# Patient Record
Sex: FEMALE | Race: WHITE | NOT HISPANIC OR LATINO | Employment: OTHER | RURAL
[De-identification: names, ages, dates, MRNs, and addresses within clinical notes are randomized per-mention and may not be internally consistent; named-entity substitution may affect disease eponyms.]

---

## 2019-02-21 ENCOUNTER — CONVERSION ENCOUNTER (OUTPATIENT)
Dept: FAMILY MEDICINE CLINIC | Facility: CLINIC | Age: 64
End: 2019-02-21

## 2019-02-21 LAB — HEMOCCULT STL QL IA: NEGATIVE

## 2019-03-29 ENCOUNTER — ON CAMPUS - OUTPATIENT (AMBULATORY)
Dept: URBAN - METROPOLITAN AREA HOSPITAL 2 | Facility: HOSPITAL | Age: 64
End: 2019-03-29
Payer: COMMERCIAL

## 2019-03-29 ENCOUNTER — OFFICE (AMBULATORY)
Dept: URBAN - METROPOLITAN AREA PATHOLOGY 4 | Facility: PATHOLOGY | Age: 64
End: 2019-03-29
Payer: COMMERCIAL

## 2019-03-29 VITALS
OXYGEN SATURATION: 96 % | OXYGEN SATURATION: 97 % | DIASTOLIC BLOOD PRESSURE: 78 MMHG | TEMPERATURE: 97.9 F | HEIGHT: 67 IN | DIASTOLIC BLOOD PRESSURE: 82 MMHG | DIASTOLIC BLOOD PRESSURE: 97 MMHG | OXYGEN SATURATION: 99 % | OXYGEN SATURATION: 98 % | SYSTOLIC BLOOD PRESSURE: 124 MMHG | SYSTOLIC BLOOD PRESSURE: 118 MMHG | OXYGEN SATURATION: 95 % | HEART RATE: 68 BPM | HEART RATE: 62 BPM | DIASTOLIC BLOOD PRESSURE: 76 MMHG | DIASTOLIC BLOOD PRESSURE: 83 MMHG | HEART RATE: 66 BPM | SYSTOLIC BLOOD PRESSURE: 123 MMHG | RESPIRATION RATE: 18 BRPM | SYSTOLIC BLOOD PRESSURE: 122 MMHG | DIASTOLIC BLOOD PRESSURE: 84 MMHG | DIASTOLIC BLOOD PRESSURE: 63 MMHG | WEIGHT: 195 LBS | HEART RATE: 71 BPM | RESPIRATION RATE: 16 BRPM | HEART RATE: 72 BPM | HEART RATE: 67 BPM | SYSTOLIC BLOOD PRESSURE: 129 MMHG | HEART RATE: 63 BPM | SYSTOLIC BLOOD PRESSURE: 116 MMHG

## 2019-03-29 DIAGNOSIS — K62.1 RECTAL POLYP: ICD-10-CM

## 2019-03-29 DIAGNOSIS — Z12.11 ENCOUNTER FOR SCREENING FOR MALIGNANT NEOPLASM OF COLON: ICD-10-CM

## 2019-03-29 DIAGNOSIS — K64.8 OTHER HEMORRHOIDS: ICD-10-CM

## 2019-03-29 DIAGNOSIS — K57.30 DIVERTICULOSIS OF LARGE INTESTINE WITHOUT PERFORATION OR ABS: ICD-10-CM

## 2019-03-29 LAB
GI HISTOLOGY: A. UNSPECIFIED: (no result)
GI HISTOLOGY: PDF REPORT: (no result)

## 2019-03-29 PROCEDURE — 45380 COLONOSCOPY AND BIOPSY: CPT | Mod: 33 | Performed by: INTERNAL MEDICINE

## 2019-03-29 PROCEDURE — 88305 TISSUE EXAM BY PATHOLOGIST: CPT | Mod: 33 | Performed by: INTERNAL MEDICINE

## 2019-07-10 ENCOUNTER — OFFICE VISIT (OUTPATIENT)
Dept: FAMILY MEDICINE CLINIC | Facility: CLINIC | Age: 64
End: 2019-07-10

## 2019-07-10 ENCOUNTER — HOSPITAL ENCOUNTER (OUTPATIENT)
Dept: GENERAL RADIOLOGY | Facility: HOSPITAL | Age: 64
Discharge: HOME | End: 2019-07-10
Admitting: FAMILY MEDICINE

## 2019-07-10 VITALS
SYSTOLIC BLOOD PRESSURE: 126 MMHG | RESPIRATION RATE: 18 BRPM | HEART RATE: 76 BPM | BODY MASS INDEX: 31.86 KG/M2 | HEIGHT: 67 IN | TEMPERATURE: 97.7 F | WEIGHT: 203 LBS | OXYGEN SATURATION: 96 % | DIASTOLIC BLOOD PRESSURE: 78 MMHG

## 2019-07-10 DIAGNOSIS — G44.209 TENSION HEADACHE: Primary | ICD-10-CM

## 2019-07-10 DIAGNOSIS — M54.2 NECK PAIN: ICD-10-CM

## 2019-07-10 PROCEDURE — 72050 X-RAY EXAM NECK SPINE 4/5VWS: CPT

## 2019-07-10 PROCEDURE — 99213 OFFICE O/P EST LOW 20 MIN: CPT | Performed by: FAMILY MEDICINE

## 2019-07-10 RX ORDER — TIZANIDINE 4 MG/1
4 TABLET ORAL 3 TIMES DAILY PRN
Qty: 90 TABLET | Refills: 2 | Status: SHIPPED | OUTPATIENT
Start: 2019-07-10 | End: 2019-10-05 | Stop reason: SDUPTHER

## 2019-07-10 RX ORDER — ACETAMINOPHEN, ASPIRIN AND CAFFEINE 250; 250; 65 MG/1; MG/1; MG/1
2 TABLET, FILM COATED ORAL EVERY 8 HOURS PRN
COMMUNITY

## 2019-07-10 RX ORDER — ESCITALOPRAM OXALATE 10 MG/1
1 TABLET ORAL NIGHTLY
Refills: 5 | COMMUNITY
Start: 2019-06-20 | End: 2019-09-23

## 2019-07-10 RX ORDER — MELOXICAM 15 MG/1
1 TABLET ORAL AS NEEDED
COMMUNITY
Start: 2019-05-11 | End: 2020-09-18

## 2019-07-15 ENCOUNTER — TELEPHONE (OUTPATIENT)
Dept: FAMILY MEDICINE CLINIC | Facility: CLINIC | Age: 64
End: 2019-07-15

## 2019-09-23 ENCOUNTER — OFFICE VISIT (OUTPATIENT)
Dept: FAMILY MEDICINE CLINIC | Facility: CLINIC | Age: 64
End: 2019-09-23

## 2019-09-23 VITALS
SYSTOLIC BLOOD PRESSURE: 132 MMHG | OXYGEN SATURATION: 97 % | HEIGHT: 67 IN | RESPIRATION RATE: 18 BRPM | DIASTOLIC BLOOD PRESSURE: 88 MMHG | BODY MASS INDEX: 32.36 KG/M2 | HEART RATE: 55 BPM | WEIGHT: 206.2 LBS | TEMPERATURE: 97.4 F

## 2019-09-23 DIAGNOSIS — Z01.818 PRE-OPERATIVE CLEARANCE: ICD-10-CM

## 2019-09-23 DIAGNOSIS — Z11.59 NEED FOR HEPATITIS C SCREENING TEST: ICD-10-CM

## 2019-09-23 DIAGNOSIS — Z12.39 SCREENING FOR BREAST CANCER: ICD-10-CM

## 2019-09-23 DIAGNOSIS — Z23 NEED FOR PNEUMOCOCCAL VACCINATION: ICD-10-CM

## 2019-09-23 DIAGNOSIS — Z23 NEED FOR TETANUS BOOSTER: ICD-10-CM

## 2019-09-23 DIAGNOSIS — Z12.4 SCREENING FOR CERVICAL CANCER: ICD-10-CM

## 2019-09-23 DIAGNOSIS — Z00.00 ANNUAL PHYSICAL EXAM: Primary | ICD-10-CM

## 2019-09-23 DIAGNOSIS — M17.12 PRIMARY OSTEOARTHRITIS OF LEFT KNEE: ICD-10-CM

## 2019-09-23 DIAGNOSIS — Z90.81 H/O SPLENECTOMY: ICD-10-CM

## 2019-09-23 DIAGNOSIS — F33.0 MILD EPISODE OF RECURRENT MAJOR DEPRESSIVE DISORDER (HCC): ICD-10-CM

## 2019-09-23 PROBLEM — R51.9 CHRONIC HEADACHES: Status: ACTIVE | Noted: 2019-02-14

## 2019-09-23 PROBLEM — M67.919 DISORDER OF BURSAE AND TENDONS IN SHOULDER REGION: Status: ACTIVE | Noted: 2019-09-23

## 2019-09-23 PROBLEM — D64.9 ANEMIA: Status: ACTIVE | Noted: 2019-02-14

## 2019-09-23 PROBLEM — G89.29 CHRONIC HEADACHES: Status: ACTIVE | Noted: 2019-02-14

## 2019-09-23 PROBLEM — F33.1 MODERATE EPISODE OF RECURRENT MAJOR DEPRESSIVE DISORDER: Status: ACTIVE | Noted: 2019-02-14

## 2019-09-23 PROBLEM — F32.A DEPRESSION: Status: ACTIVE | Noted: 2019-09-23

## 2019-09-23 PROBLEM — E66.3 OVERWEIGHT: Status: ACTIVE | Noted: 2019-09-23

## 2019-09-23 PROBLEM — I10 HYPERTENSION: Status: ACTIVE | Noted: 2019-02-14

## 2019-09-23 PROBLEM — E66.9 OBESITY: Status: ACTIVE | Noted: 2019-09-23

## 2019-09-23 PROBLEM — M19.90 ARTHRITIS: Status: ACTIVE | Noted: 2019-02-14

## 2019-09-23 PROBLEM — M71.9 DISORDER OF BURSAE AND TENDONS IN SHOULDER REGION: Status: ACTIVE | Noted: 2019-09-23

## 2019-09-23 LAB
BILIRUB BLD-MCNC: ABNORMAL MG/DL
CLARITY, POC: CLEAR
COLOR UR: YELLOW
GLUCOSE UR STRIP-MCNC: NEGATIVE MG/DL
KETONES UR QL: NEGATIVE
LEUKOCYTE EST, POC: ABNORMAL
NITRITE UR-MCNC: NEGATIVE MG/ML
PH UR: 5 [PH] (ref 5–8)
PROT UR STRIP-MCNC: ABNORMAL MG/DL
RBC # UR STRIP: NEGATIVE /UL
SP GR UR: 1.01 (ref 1–1.03)
UROBILINOGEN UR QL: NORMAL

## 2019-09-23 PROCEDURE — 81002 URINALYSIS NONAUTO W/O SCOPE: CPT | Performed by: FAMILY MEDICINE

## 2019-09-23 PROCEDURE — 90472 IMMUNIZATION ADMIN EACH ADD: CPT | Performed by: FAMILY MEDICINE

## 2019-09-23 PROCEDURE — 99396 PREV VISIT EST AGE 40-64: CPT | Performed by: FAMILY MEDICINE

## 2019-09-23 PROCEDURE — 90732 PPSV23 VACC 2 YRS+ SUBQ/IM: CPT | Performed by: FAMILY MEDICINE

## 2019-09-23 PROCEDURE — 90471 IMMUNIZATION ADMIN: CPT | Performed by: FAMILY MEDICINE

## 2019-09-23 PROCEDURE — 90715 TDAP VACCINE 7 YRS/> IM: CPT | Performed by: FAMILY MEDICINE

## 2019-09-23 RX ORDER — ESCITALOPRAM OXALATE 20 MG/1
20 TABLET ORAL NIGHTLY
Status: SHIPPED | COMMUNITY
Start: 2019-09-23 | End: 2020-09-18 | Stop reason: SDUPTHER

## 2019-09-23 RX ORDER — DICLOFENAC SODIUM 75 MG/1
1 TABLET, DELAYED RELEASE ORAL 2 TIMES DAILY PRN
COMMUNITY
Start: 2019-08-08 | End: 2020-09-18

## 2019-09-23 RX ORDER — TIZANIDINE 4 MG/1
1 TABLET ORAL 3 TIMES DAILY PRN
Refills: 2 | COMMUNITY
Start: 2019-09-09 | End: 2019-10-05 | Stop reason: SDUPTHER

## 2019-09-23 NOTE — PROGRESS NOTES
Chief Complaint   Patient presents with   • Annual Exam     Subjective   Adela Dowell is a 64 y.o. female here for her annual physical with me. Adela is here for coordination of medical care, to discuss health maintenance, disease prevention as well as to followup on medical problems. Patient has been followed by me since 07/10/2019.  Patient's last CPE was about 3 years ago with Dr. Ronald Foley.  Activity level is minimal.  Exercises 3 per week.  Appetite is good. Feels well with minimal complaints. Energy level is fair. Sleeps well. Patient's last colonoscopy was March 2019. She is advised to repeat in 10 years. Patient's last mammogram was about 5 years ago at [facility] . Patient is doing routine self skin exam monthly. Patient is doing routine self-breast exams never.    Transition to Care:  Since her last visit, she was seen by Dr. Dillard and started on escitalopram.  Dosage has been titrated to 20 mg daily, and she reports improvement in her symptoms.    She will be having knee replacement surgery at the end of next month ([facility], Dr. Castro and Dr. Godinez).  Needs pre-operative clearance today.    Last mammogram was done at [facility] (10/14/2015) and was abnormal (BIRAD 0, 6 mm asymmetric density left upper breast, additional imaging of the left breast recommended).  She does not recall having additional imaging done.  She has no breast complaints today.    Last pap smear years ago.  She denies h/o abnormal pap smears and denies gyn complaints today.      Allergies:  Allergies   Allergen Reactions   • Levofloxacin Other (See Comments) and Swelling     Joint swelling       Social History:  Social History     Socioeconomic History   • Marital status:      Spouse name: Not on file   • Number of children: Not on file   • Years of education: Not on file   • Highest education level: Not on file   Tobacco Use   • Smoking status: Never Smoker   • Smokeless tobacco: Never Used   Substance and  Sexual Activity   • Alcohol use: No     Frequency: Never   • Drug use: No   • Sexual activity: Defer       Family History:  History reviewed. No pertinent family history.    Past Medical History :  Active Ambulatory Problems     Diagnosis Date Noted   • Moderate episode of recurrent major depressive disorder (CMS/HCC) 02/14/2019   • Hypertension 02/14/2019   • Disorder of bursae and tendons in shoulder region 09/23/2019   • Depression 09/23/2019   • Chronic headaches 02/14/2019   • Arthritis 02/14/2019   • Anemia 02/14/2019   • Overweight 09/23/2019   • Obesity 09/23/2019   • H/O splenectomy 09/23/2019       Past Medical History:   Diagnosis Date   • Anxiety    • Depression    • Headache        Medication List:    Current Outpatient Medications:   •  aspirin-acetaminophen-caffeine (EXCEDRIN MIGRAINE) 250-250-65 MG per tablet, Take 2 tablets by mouth Every 8 (Eight) Hours As Needed for Headache., Disp: , Rfl:   •  diclofenac (VOLTAREN) 75 MG EC tablet, Take 1 tablet by mouth 2 (Two) Times a Day As Needed., Disp: , Rfl:   •  escitalopram (LEXAPRO) 20 MG tablet, Take 1 tablet by mouth Every Night., Disp: , Rfl:   •  tiZANidine (ZANAFLEX) 4 MG tablet, Take 1 mg by mouth 3 (Three) Times a Day As Needed., Disp: , Rfl: 2  •  meloxicam (MOBIC) 15 MG tablet, Take 1 tablet by mouth As Needed., Disp: , Rfl:   No current facility-administered medications for this visit.     Past Surgical History:  Past Surgical History:   Procedure Laterality Date   • ROTATOR CUFF REPAIR Right 2011    x2   • SINUS SURGERY Bilateral 2000   • SPLENECTOMY     • TUBAL ABDOMINAL LIGATION       Depression Screen not needed due to diagnosis of major depression.    Review Of Systems:  Review of Systems   Constitutional: Negative for chills and fever.   HENT: Negative for ear pain, sore throat and trouble swallowing.    Eyes: Negative for double vision and visual disturbance.   Respiratory: Negative for cough and shortness of breath.    Cardiovascular:  "Negative for chest pain, palpitations and leg swelling.   Gastrointestinal: Negative for abdominal pain, blood in stool, constipation, diarrhea, nausea and vomiting.   Endocrine: Negative for polydipsia and polyuria.   Genitourinary: Negative for breast discharge, breast lump, breast pain, dysuria, hematuria, pelvic pain, urinary incontinence, vaginal bleeding and vaginal discharge.   Musculoskeletal: Positive for arthralgias. Negative for myalgias.   Skin: Positive for skin lesions. Negative for rash.   Allergic/Immunologic: Positive for immunocompromised state.   Neurological: Negative for seizures, syncope, numbness and headache (none currently).   Psychiatric/Behavioral: Positive for depressed mood (better). Negative for behavioral problems.       The following portions of the patient's history were reviewed and updated as appropriate: allergies, current medications, past family history, past medical history, past social history, past surgical history and problem list.      Physical Exam:  Vital Signs:  /88 (BP Location: Left arm, Patient Position: Sitting, Cuff Size: Large Adult)   Pulse 55   Temp 97.4 °F (36.3 °C) (Oral)   Resp 18   Ht 170.2 cm (67\")   Wt 93.5 kg (206 lb 3.2 oz)   SpO2 97%   BMI 32.30 kg/m²     Physical Exam   Constitutional: She appears well-developed and well-nourished. No distress.   HENT:   Head: Normocephalic and atraumatic.   Right Ear: Tympanic membrane, external ear and ear canal normal.   Left Ear: Tympanic membrane, external ear and ear canal normal.   Mouth/Throat: Uvula is midline and mucous membranes are normal. Posterior oropharyngeal erythema present. No posterior oropharyngeal edema or tonsillar abscesses.   Eyes: Conjunctivae, EOM and lids are normal. Pupils are equal, round, and reactive to light.   Neck: Normal range of motion. Neck supple. No JVD present. Carotid bruit is not present. No edema present. No thyromegaly present.   Cardiovascular: Normal rate, " regular rhythm, S1 normal, S2 normal and normal heart sounds.   No murmur heard.  Pulses:       Radial pulses are 2+ on the right side, and 2+ on the left side.        Posterior tibial pulses are 2+ on the right side, and 2+ on the left side.   Pulmonary/Chest: Effort normal and breath sounds normal. Right breast exhibits no mass, no nipple discharge, no skin change and no tenderness. Left breast exhibits no mass, no nipple discharge, no skin change and no tenderness. Breasts are symmetrical.   Abdominal: Soft. Bowel sounds are normal. There is no tenderness. There is no rebound and no guarding.   Genitourinary: Rectum normal, vagina normal, uterus normal and cervix normal. Pelvic exam was performed with patient supine. There is no lesion on the right labia. There is no lesion on the left labia. Right adnexum displays no mass and no tenderness. Left adnexum displays no mass and no tenderness. No vaginal discharge found.   Musculoskeletal: Normal range of motion.     Vascular Status -  Her right foot exhibits normal foot vasculature  and no edema. Her left foot exhibits normal foot vasculature  and no edema.  Lymphadenopathy:     She has no cervical adenopathy.   Neurological: She is alert. No cranial nerve deficit.   Reflex Scores:       Bicep reflexes are 1+ on the right side and 1+ on the left side.       Patellar reflexes are 2+ on the right side and 2+ on the left side.  Skin: Skin is warm, dry and intact. Capillary refill takes less than 2 seconds. Turgor is normal. No rash noted. No cyanosis.        Psychiatric: She has a normal mood and affect. Her speech is normal and behavior is normal. Judgment and thought content normal. Cognition and memory are normal.     Brief Urine Lab Results  (Last result in the past 365 days)      Color   Clarity   Blood   Leuk Est   Nitrite   Protein   CREAT   Urine HCG        09/23/19 0846 Yellow Clear Negative Trace Negative 2+                 Assessment and Plan:  Diagnoses and  all orders for this visit:    1. Annual physical exam (Primary)  Comments:  Questions and concerns addressed.  Age appropriate screening conducted.  Orders:  -     CBC Auto Differential  -     Comprehensive Metabolic Panel  -     Lipid Panel  -     TSH  -     POCT urinalysis dipstick, manual  -     Liquid-based Pap Smear, Screening  -     HPV, Low Volume Reflex    2. Screening for cervical cancer  Comments:  Pap smear collected.  Orders:  -     Liquid-based Pap Smear, Screening  -     HPV, Low Volume Reflex    3. Screening for breast cancer  Comments:  Mammogram ordered and scheduled for [facility] on 9/26/19 at 9:00 am.  Orders:  -     Mammo Screening Digital Tomosynthesis Bilateral With CAD; Future    4. Need for hepatitis C screening test  -     Hepatitis C antibody    5. Need for pneumococcal vaccination  -     Pneumococcal Polysaccharide Vaccine 23-Valent Greater Than or Equal To 1yo Subcutaneous / IM    6. Need for tetanus booster  -     Tdap Vaccine Greater Than or Equal To 8yo IM    7. H/O splenectomy  Comments:  Plan to update Prevnar and Meningitis vaccinations at future appointment.  Orders:  -     Pneumococcal Polysaccharide Vaccine 23-Valent Greater Than or Equal To 1yo Subcutaneous / IM    8. Mild episode of recurrent major depressive disorder (CMS/HCC)  Comments:  Per Dr. Dillard.    9. Primary osteoarthritis of left knee  Comments:  Per orthopedic surgery.    10. Pre-operative clearance  Comments:  She is low risk for CV and pulmonary complications.  Await labs.  She will have pre-op clearance through [facility].  Will send letter to surgery.

## 2019-09-24 DIAGNOSIS — R74.8 ELEVATED ALKALINE PHOSPHATASE LEVEL: Primary | ICD-10-CM

## 2019-09-24 LAB
ALBUMIN SERPL-MCNC: 4.3 G/DL (ref 3.6–4.8)
ALBUMIN/GLOB SERPL: 1.8 {RATIO} (ref 1.2–2.2)
ALP SERPL-CCNC: 153 IU/L (ref 39–117)
ALT SERPL-CCNC: 12 IU/L (ref 0–32)
AST SERPL-CCNC: 20 IU/L (ref 0–40)
BASOPHILS # BLD AUTO: 0.1 X10E3/UL (ref 0–0.2)
BASOPHILS NFR BLD AUTO: 1 %
BILIRUB SERPL-MCNC: 0.5 MG/DL (ref 0–1.2)
BUN SERPL-MCNC: 13 MG/DL (ref 8–27)
BUN/CREAT SERPL: 18 (ref 12–28)
CALCIUM SERPL-MCNC: 9.4 MG/DL (ref 8.7–10.3)
CHLORIDE SERPL-SCNC: 103 MMOL/L (ref 96–106)
CHOLEST SERPL-MCNC: 185 MG/DL (ref 100–199)
CO2 SERPL-SCNC: 26 MMOL/L (ref 20–29)
CREAT SERPL-MCNC: 0.72 MG/DL (ref 0.57–1)
EOSINOPHIL # BLD AUTO: 0.5 X10E3/UL (ref 0–0.4)
EOSINOPHIL NFR BLD AUTO: 7 %
ERYTHROCYTE [DISTWIDTH] IN BLOOD BY AUTOMATED COUNT: 13.8 % (ref 12.3–15.4)
GLOBULIN SER CALC-MCNC: 2.4 G/DL (ref 1.5–4.5)
GLUCOSE SERPL-MCNC: 85 MG/DL (ref 65–99)
HCT VFR BLD AUTO: 41 % (ref 34–46.6)
HCV AB S/CO SERPL IA: <0.1 S/CO RATIO (ref 0–0.9)
HDLC SERPL-MCNC: 50 MG/DL
HGB BLD-MCNC: 13.8 G/DL (ref 11.1–15.9)
IMM GRANULOCYTES # BLD AUTO: 0 X10E3/UL (ref 0–0.1)
IMM GRANULOCYTES NFR BLD AUTO: 0 %
LDLC SERPL CALC-MCNC: 112 MG/DL (ref 0–99)
LYMPHOCYTES # BLD AUTO: 3.1 X10E3/UL (ref 0.7–3.1)
LYMPHOCYTES NFR BLD AUTO: 42 %
MCH RBC QN AUTO: 28.9 PG (ref 26.6–33)
MCHC RBC AUTO-ENTMCNC: 33.7 G/DL (ref 31.5–35.7)
MCV RBC AUTO: 86 FL (ref 79–97)
MONOCYTES # BLD AUTO: 0.8 X10E3/UL (ref 0.1–0.9)
MONOCYTES NFR BLD AUTO: 11 %
NEUTROPHILS # BLD AUTO: 2.9 X10E3/UL (ref 1.4–7)
NEUTROPHILS NFR BLD AUTO: 39 %
PLATELET # BLD AUTO: 404 X10E3/UL (ref 150–450)
POTASSIUM SERPL-SCNC: 4.8 MMOL/L (ref 3.5–5.2)
PROT SERPL-MCNC: 6.7 G/DL (ref 6–8.5)
RBC # BLD AUTO: 4.78 X10E6/UL (ref 3.77–5.28)
SODIUM SERPL-SCNC: 144 MMOL/L (ref 134–144)
TRIGL SERPL-MCNC: 116 MG/DL (ref 0–149)
TSH SERPL DL<=0.005 MIU/L-ACNC: 2.57 UIU/ML (ref 0.45–4.5)
VLDLC SERPL CALC-MCNC: 23 MG/DL (ref 5–40)
WBC # BLD AUTO: 7.5 X10E3/UL (ref 3.4–10.8)

## 2019-09-27 LAB
CYTOLOGIST CVX/VAG CYTO: NORMAL
CYTOLOGY CVX/VAG DOC CYTO: NORMAL
DX ICD CODE: NORMAL
HIV 1 & 2 AB SER-IMP: NORMAL
HPV I/H RISK 1 DNA CVX QL PROBE+SIG AMP: NEGATIVE
OTHER STN SPEC: NORMAL
STAT OF ADQ CVX/VAG CYTO-IMP: NORMAL

## 2019-09-30 DIAGNOSIS — Z12.39 SCREENING FOR BREAST CANCER: ICD-10-CM

## 2019-10-01 ENCOUNTER — RESULTS ENCOUNTER (OUTPATIENT)
Dept: FAMILY MEDICINE CLINIC | Facility: CLINIC | Age: 64
End: 2019-10-01

## 2019-10-01 DIAGNOSIS — R74.8 ELEVATED ALKALINE PHOSPHATASE LEVEL: ICD-10-CM

## 2019-10-02 LAB
ALP BONE CFR SERPL: 43 % (ref 14–68)
ALP INTEST CFR SERPL: 2 % (ref 0–18)
ALP LIVER CFR SERPL: 55 % (ref 18–85)
ALP SERPL-CCNC: 155 IU/L (ref 39–117)
GGT SERPL-CCNC: 13 IU/L (ref 0–60)

## 2019-10-05 DIAGNOSIS — G44.209 TENSION HEADACHE: ICD-10-CM

## 2019-10-05 RX ORDER — TIZANIDINE 4 MG/1
TABLET ORAL
Qty: 90 TABLET | Refills: 2 | Status: SHIPPED | OUTPATIENT
Start: 2019-10-05 | End: 2020-01-29

## 2019-11-15 ENCOUNTER — TREATMENT (OUTPATIENT)
Dept: PHYSICAL THERAPY | Facility: CLINIC | Age: 64
End: 2019-11-15

## 2019-11-15 ENCOUNTER — TRANSCRIBE ORDERS (OUTPATIENT)
Dept: PHYSICAL THERAPY | Facility: CLINIC | Age: 64
End: 2019-11-15

## 2019-11-15 DIAGNOSIS — M25.562 CHRONIC PAIN OF LEFT KNEE: Primary | ICD-10-CM

## 2019-11-15 DIAGNOSIS — G89.29 CHRONIC PAIN OF LEFT KNEE: Primary | ICD-10-CM

## 2019-11-15 DIAGNOSIS — Z96.652 PRESENCE OF LEFT ARTIFICIAL KNEE JOINT: ICD-10-CM

## 2019-11-15 DIAGNOSIS — Z96.652 TOTAL KNEE REPLACEMENT STATUS, LEFT: Primary | ICD-10-CM

## 2019-11-15 PROCEDURE — 97014 ELECTRIC STIMULATION THERAPY: CPT | Performed by: PHYSICAL THERAPIST

## 2019-11-15 PROCEDURE — 97140 MANUAL THERAPY 1/> REGIONS: CPT | Performed by: PHYSICAL THERAPIST

## 2019-11-15 PROCEDURE — 97161 PT EVAL LOW COMPLEX 20 MIN: CPT | Performed by: PHYSICAL THERAPIST

## 2019-11-15 PROCEDURE — 97110 THERAPEUTIC EXERCISES: CPT | Performed by: PHYSICAL THERAPIST

## 2019-11-15 NOTE — PROGRESS NOTES
"  Physical Therapy Initial Evaluation and Plan of Care    Patient: Adela Dowell   : 1955  Diagnosis/ICD-10 Code:  Chronic pain of left knee [M25.562, G89.29]  Referring practitioner: Milton Castro MD  Date of Initial Visit: 11/15/2019  Today's Date: 11/15/2019  Patient seen for 1 sessions             Subjective Evaluation    History of Present Illness  Mechanism of injury: Patient is a 64 y.o. WF who presents s/p L TKA on 10/29/19.  Return to [place] 19.  Had home health PT after hospital.  Personal goals are to ride horses, kneel for gardening (\"sit on my heels\").  She has started weaning from pain meds, going 9 hours between doses.  She is compliant with HEP thus far.  She missed pain meds last night and is more sore this morning.      Quality of life: excellent    Pain  Current pain ratin  At best pain ratin  At worst pain ratin  Location: L knee  Quality: dull ache, discomfort, sharp and tight  Relieving factors: ice, medications, relaxation and rest  Aggravating factors: movement, standing, ambulation, prolonged positioning and sleeping  Progression: improved    Treatments  Discharged from (in last 30 days): inpatient hospitalization and home health care  Patient Goals  Patient goals for therapy: decreased edema, decreased pain, improved balance, increased motion and increased strength             Objective Oxford knee score indicates 85% impairment with limitations in standing, walking, bending, transfers.  AROM L knee 5 deg extension lag to 115 deg flexion.  Ambulates with minimal antalgia using cane on R.  Minimal swelling noted at joint line.  Dizziness noted after rolling on table.  Incision healing well, staples removed, steri-strips in place.      Assessment & Plan     Assessment  Impairments: abnormal gait, abnormal muscle firing, abnormal muscle tone, abnormal or restricted ROM, activity intolerance, impaired balance, impaired physical strength, lacks appropriate home " exercise program and pain with function  Prognosis: good  Functional Limitations: sleeping, walking, uncomfortable because of pain, sitting and standing  Goals  Plan Goals: Patient independent with HEP in 2 weeks  Tolerate 10 min Nustep in 2 weeks  Able to return to driving in 2 weeks  Able to ambulate independently without AD and minimal antalgia in 2 weeks  Improve function as evidenced by Oxford knee score of 20% or less by discharge (85% impairment initially)  Able to return to riding horses by discharge  Perform 10 reps of repeated sit to stand without arms in 30 seconds by discharge       Plan  Therapy options: will be seen for skilled physical therapy services  Planned modality interventions: cryotherapy, electrical stimulation/Russian stimulation and thermotherapy (hydrocollator packs)  Other planned modality interventions: physical agents as needed  Planned therapy interventions: balance/weight-bearing training, flexibility, functional ROM exercises, home exercise program, gait training, manual therapy, neuromuscular re-education, strengthening, stretching, therapeutic activities and transfer training  Treatment plan discussed with: patient  Plan details: Plan to continue PT 2x's per week up to 12 visits if needed.        Timed:         Manual Therapy:    15     mins  85251;     Therapeutic Exercise:    15     mins  54642;     Neuromuscular Manju:        mins  98830;    Therapeutic Activity:          mins  95927;     Gait Training:           mins  44161;     Ultrasound:          mins  48531;    Ionto                                   mins   54888  Self-care  ____ mins 30339    Un-Timed:  Electrical Stimulation:    15     mins  82446 ( );  Dry Needling          mins self-pay  Traction          mins 65717  Low Eval     15     Mins  57754  Mod Eval          Mins  87092  High Eval                            Mins  65021  Canal repositioning _____ mins  82139        Timed Treatment:   30   mins   Total  Treatment:     60   mins    PT SIGNATURE: Woody LOWERY Sprigler, PT   DATE TREATMENT INITIATED: 11/15/2019    Initial Certification  Certification Period: 2/13/2020  I certify that the therapy services are furnished while this patient is under my care.  The services outlined above are required by this patient, and will be reviewed every 90 days.     PHYSICIAN: Milton Castro, MD  ________________________________________________________________________________________________________    DATE: _______________________________________________________________________________________________________________________________________    Please sign and return via fax to  [phone] Thank you, [facility].

## 2019-11-18 ENCOUNTER — TREATMENT (OUTPATIENT)
Dept: PHYSICAL THERAPY | Facility: CLINIC | Age: 64
End: 2019-11-18

## 2019-11-18 DIAGNOSIS — M25.562 CHRONIC PAIN OF LEFT KNEE: Primary | ICD-10-CM

## 2019-11-18 DIAGNOSIS — G89.29 CHRONIC PAIN OF LEFT KNEE: Primary | ICD-10-CM

## 2019-11-18 DIAGNOSIS — Z96.652 PRESENCE OF LEFT ARTIFICIAL KNEE JOINT: ICD-10-CM

## 2019-11-18 PROCEDURE — 97140 MANUAL THERAPY 1/> REGIONS: CPT | Performed by: PHYSICAL THERAPIST

## 2019-11-18 PROCEDURE — 97014 ELECTRIC STIMULATION THERAPY: CPT | Performed by: PHYSICAL THERAPIST

## 2019-11-18 PROCEDURE — 97110 THERAPEUTIC EXERCISES: CPT | Performed by: PHYSICAL THERAPIST

## 2019-11-18 NOTE — PROGRESS NOTES
"     Physical Therapy Daily Progress Note    Patient: Adela Dowell   : 1955  Diagnosis/ICD-10 Code:  Chronic pain of left knee [M25.562, G89.29]  Referring practitioner: Milton Castro MD  Date of Initial Visit: Type: THERAPY  Noted: 11/15/2019  Today's Date: 2019  Patient seen for 2 sessions             Subjective Patient reports intermittent \"different\" pain medial/anterior knee since yesterday.      Objective   See Exercise, Manual, and Modality Logs for complete treatment. Added Nustep, 6\" step up, TKE ball with good response.  New pain occurred with standing hip extension on L and again with rolling on table (transitional movement).      Assessment/Plan  Patient independent with HEP in 2 weeks - NOT MET  Tolerate 10 min Nustep in 2 weeks - NOT MET  Able to return to driving in 2 weeks - NOT MET  Able to ambulate independently without AD and minimal antalgia in 2 weeks - NOT MET  Improve function as evidenced by Oxford knee score of 20% or less by discharge (85% impairment initially) - NOT MET  Able to return to riding horses by discharge - NOT MET  Perform 10 reps of repeated sit to stand without arms in 30 seconds by discharge - NOT MET    Progress per Plan of Care           Timed:         Manual Therapy:    10     mins  05474;     Therapeutic Exercise:    15     mins  48307;     Neuromuscular Manju:        mins  12984;    Therapeutic Activity:          mins  80198;     Gait Training:           mins  88243;     Ultrasound:          mins  43501;    Ionto                                   mins   61387  Self Care                            mins   08401      Un-Timed:  Electrical Stimulation:    15     mins  24122 ( );  Dry Needling          mins self-pay  Traction          mins 61034  Low Eval          Mins  73239  Mod Eval          Mins  59614  High Eval                            Mins  33680  Canalith Repos                   mins  16065    Timed Treatment:   25   mins   Total " Treatment:     40   mins    Robin A Sprigler, PT  Physical Therapist

## 2019-11-20 ENCOUNTER — TREATMENT (OUTPATIENT)
Dept: PHYSICAL THERAPY | Facility: CLINIC | Age: 64
End: 2019-11-20

## 2019-11-20 DIAGNOSIS — G89.29 CHRONIC PAIN OF LEFT KNEE: Primary | ICD-10-CM

## 2019-11-20 DIAGNOSIS — M25.562 CHRONIC PAIN OF LEFT KNEE: Primary | ICD-10-CM

## 2019-11-20 DIAGNOSIS — Z96.652 PRESENCE OF LEFT ARTIFICIAL KNEE JOINT: ICD-10-CM

## 2019-11-20 PROCEDURE — 97110 THERAPEUTIC EXERCISES: CPT | Performed by: PHYSICAL THERAPIST

## 2019-11-20 PROCEDURE — 97140 MANUAL THERAPY 1/> REGIONS: CPT | Performed by: PHYSICAL THERAPIST

## 2019-11-20 PROCEDURE — 97014 ELECTRIC STIMULATION THERAPY: CPT | Performed by: PHYSICAL THERAPIST

## 2019-11-20 NOTE — PROGRESS NOTES
Physical Therapy Daily Progress Note    Patient: Adela Dowell   : 1955  Diagnosis/ICD-10 Code:  Chronic pain of left knee [M25.562, G89.29]  Referring practitioner: Milton Castro MD  Date of Initial Visit: Type: THERAPY  Noted: 11/15/2019  Today's Date: 2019  Patient seen for 3 sessions             Subjective Patient still having sharp intermittent pain but getting better.  Quad set increases pain.  Using peddler at home.    Objective   See Exercise, Manual, and Modality Logs for complete treatment. Added recumbent bike with full revolution, leg press.  Sharp pain tends to happen with full knee extension with hip neutral.  Incision healing nicely, steri strips have fallen off.        Assessment/Plan  Patient independent with HEP in 2 weeks - MET  Tolerate 10 min Nustep in 2 weeks - MET  Able to return to driving in 2 weeks - NOT MET  Able to ambulate independently without AD and minimal antalgia in 2 weeks - NOT MET  Improve function as evidenced by Oxford knee score of 20% or less by discharge (85% impairment initially) - NOT MET  Able to return to riding horses by discharge - NOT MET  Perform 10 reps of repeated sit to stand without arms in 30 seconds by discharge - NOT MET    Progress per Plan of Care           Timed:         Manual Therapy:    10     mins  75350;     Therapeutic Exercise:    15     mins  66672;     Neuromuscular Manju:        mins  75550;    Therapeutic Activity:          mins  81336;     Gait Training:           mins  99430;     Ultrasound:          mins  00972;    Ionto                                   mins   36545  Self Care                            mins   43819      Un-Timed:  Electrical Stimulation:    15     mins  11104 ( );  Dry Needling          mins self-pay  Traction          mins 87944  Low Eval          Mins  31226  Mod Eval          Mins  28849  High Eval                            Mins  21242  Canalith Repos                   mins  43907    Timed  Treatment:   25   mins   Total Treatment:     40   mins    Robin A Sprigler, PT  Physical Therapist

## 2019-11-25 ENCOUNTER — TREATMENT (OUTPATIENT)
Dept: PHYSICAL THERAPY | Facility: CLINIC | Age: 64
End: 2019-11-25

## 2019-11-25 DIAGNOSIS — Z96.652 PRESENCE OF LEFT ARTIFICIAL KNEE JOINT: ICD-10-CM

## 2019-11-25 DIAGNOSIS — M25.562 CHRONIC PAIN OF LEFT KNEE: Primary | ICD-10-CM

## 2019-11-25 DIAGNOSIS — G89.29 CHRONIC PAIN OF LEFT KNEE: Primary | ICD-10-CM

## 2019-11-25 PROCEDURE — 97140 MANUAL THERAPY 1/> REGIONS: CPT | Performed by: PHYSICAL THERAPIST

## 2019-11-25 PROCEDURE — 97014 ELECTRIC STIMULATION THERAPY: CPT | Performed by: PHYSICAL THERAPIST

## 2019-11-25 PROCEDURE — 97110 THERAPEUTIC EXERCISES: CPT | Performed by: PHYSICAL THERAPIST

## 2019-11-25 NOTE — PROGRESS NOTES
Physical Therapy Daily Progress Note    Patient: Adela Dowell   : 1955  Diagnosis/ICD-10 Code:  Chronic pain of left knee [M25.562, G89.29]  Referring practitioner: Milton aCstro MD  Date of Initial Visit: Type: THERAPY  Noted: 11/15/2019  Today's Date: 2019  Patient seen for 4 sessions             Subjective Patient reports she's been working hard at home.  She is anxious to return to horse riding.    Objective   See Exercise, Manual, and Modality Logs for complete treatment. Able to make easy revolutions on bike.  Attempted half kneel but stopped due to nausea.  Patient became sweaty, SOA, nauseous and had to sit down and rest.  Resolved after a few minutes.  Patient denies heart issues, reports recent EKG was normal.        Assessment/Plan  Patient independent with HEP in 2 weeks - MET  Tolerate 10 min Nustep in 2 weeks - MET  Able to return to driving in 2 weeks - NOT MET  Able to ambulate independently without AD and minimal antalgia in 2 weeks - NOT MET  Improve function as evidenced by Oxford knee score of 20% or less by discharge (85% impairment initially) - NOT MET  Able to return to riding horses by discharge - NOT MET  Perform 10 reps of repeated sit to stand without arms in 30 seconds by discharge - MET    Progress per Plan of Care           Timed:         Manual Therapy:    10     mins  75616;     Therapeutic Exercise:    15     mins  49790;     Neuromuscular Manju:        mins  53629;    Therapeutic Activity:          mins  99369;     Gait Training:           mins  04692;     Ultrasound:          mins  63219;    Ionto                                   mins   37924  Self Care                            mins   67196      Un-Timed:  Electrical Stimulation:    15     mins  21594 ( );  Dry Needling          mins self-pay  Traction          mins 47058  Low Eval          Mins  00645  Mod Eval          Mins  61009  High Eval                            Mins  38289  [facility]                    mins  48839    Timed Treatment:   25   mins   Total Treatment:     40   mins    Robin A Sprigler, PT  Physical Therapist

## 2019-11-27 ENCOUNTER — TREATMENT (OUTPATIENT)
Dept: PHYSICAL THERAPY | Facility: CLINIC | Age: 64
End: 2019-11-27

## 2019-11-27 DIAGNOSIS — G89.29 CHRONIC PAIN OF LEFT KNEE: Primary | ICD-10-CM

## 2019-11-27 DIAGNOSIS — Z96.652 PRESENCE OF LEFT ARTIFICIAL KNEE JOINT: ICD-10-CM

## 2019-11-27 DIAGNOSIS — M25.562 CHRONIC PAIN OF LEFT KNEE: Primary | ICD-10-CM

## 2019-11-27 PROCEDURE — 97014 ELECTRIC STIMULATION THERAPY: CPT | Performed by: PHYSICAL THERAPIST

## 2019-11-27 PROCEDURE — 97140 MANUAL THERAPY 1/> REGIONS: CPT | Performed by: PHYSICAL THERAPIST

## 2019-11-27 PROCEDURE — 97110 THERAPEUTIC EXERCISES: CPT | Performed by: PHYSICAL THERAPIST

## 2019-11-27 NOTE — PROGRESS NOTES
Physical Therapy Daily Progress Note      Patient: Adela Dowell   : 1955  Diagnosis/ICD-10 Code:  Chronic pain of left knee [M25.562, G89.29]  Referring practitioner: Milton Castro MD  Date of Initial Visit: Type: THERAPY  Noted: 11/15/2019  Today's Date: 2019  Patient seen for 5 sessions             Subjective Pt reports that her knee is stiff this morning because she just woke up.    Objective   See Exercise, Manual, and Modality Logs for complete treatment.       Assessment/Plan  Fairly good tolerance with exercises especially after starting therapy with bike as her ROM improved and stiffness decreased.  Pt had c/o tightness and slight increased pain with manual PROM today.  Stim, MH and ice were tolerated well.     Progress per Plan of Care           Timed:         Manual Therapy:    10     mins  34270;     Therapeutic Exercise:    20     mins  17840;     Neuromuscular Manju:        mins  89729;    Therapeutic Activity:          mins  21285;     Gait Training:           mins  69871;     Ultrasound:          mins  21491;    Ionto                                   mins   00430  Self Care                            mins   63221      Un-Timed:  Electrical Stimulation:   15      mins  09844 ( );  Dry Needling          mins self-pay  Traction          mins 40847      Timed Treatment:   30   mins   Total Treatment:     45   mins    Herrera Kam PTA  Physical Therapist Assistant

## 2019-12-02 ENCOUNTER — TREATMENT (OUTPATIENT)
Dept: PHYSICAL THERAPY | Facility: CLINIC | Age: 64
End: 2019-12-02

## 2019-12-02 DIAGNOSIS — M25.562 CHRONIC PAIN OF LEFT KNEE: Primary | ICD-10-CM

## 2019-12-02 DIAGNOSIS — Z96.652 PRESENCE OF LEFT ARTIFICIAL KNEE JOINT: ICD-10-CM

## 2019-12-02 DIAGNOSIS — G89.29 CHRONIC PAIN OF LEFT KNEE: Primary | ICD-10-CM

## 2019-12-02 PROCEDURE — 97110 THERAPEUTIC EXERCISES: CPT | Performed by: PHYSICAL THERAPIST

## 2019-12-02 PROCEDURE — 97014 ELECTRIC STIMULATION THERAPY: CPT | Performed by: PHYSICAL THERAPIST

## 2019-12-02 PROCEDURE — 97140 MANUAL THERAPY 1/> REGIONS: CPT | Performed by: PHYSICAL THERAPIST

## 2019-12-02 NOTE — PROGRESS NOTES
Physical Therapy Daily Progress Note    Patient: Adela Dowell   : 1955  Diagnosis/ICD-10 Code:  Chronic pain of left knee [M25.562, G89.29]  Referring practitioner: Milton Castro MD  Date of Initial Visit: Type: THERAPY  Noted: 11/15/2019  Today's Date: 2019  Patient seen for 6 sessions             Subjective Able to get down and up off floor 3 times since last visit.  Walking without cane now.   cues her to stand up tall.  Nerve pain decreasing.    Objective   See Exercise, Manual, and Modality Logs for complete treatment. Able to perform half kneel to foam cushion 5 reps B.  Patient progressing ahead of schedule.      Assessment/Plan   Patient independent with HEP in 2 weeks - MET  Tolerate 10 min Nustep in 2 weeks - MET  Able to return to driving in 2 weeks - NOT MET  Able to ambulate independently without AD and minimal antalgia in 2 weeks - NOT MET  Improve function as evidenced by Oxford knee score of 20% or less by discharge (85% impairment initially) - NOT MET  Able to return to riding horses by discharge - NOT MET  Perform 10 reps of repeated sit to stand without arms in 30 seconds by discharge - MET    Progress per Plan of Care           Timed:         Manual Therapy:    10     mins  59697;     Therapeutic Exercise:    20     mins  92258;     Neuromuscular Manju:        mins  93266;    Therapeutic Activity:          mins  19752;     Gait Training:           mins  99621;     Ultrasound:          mins  10725;    Ionto                                   mins   79427  Self Care                            mins   81890      Un-Timed:  Electrical Stimulation:    15     mins  15684 ( );  Dry Needling          mins self-pay  Traction          mins 16832  Low Eval          Mins  64362  Mod Eval          Mins  86273  High Eval                            Mins  84752  Canalith Repos                   mins  29811    Timed Treatment:   30   mins   Total Treatment:     45    mins    Robin A Sprigler, PT  Physical Therapist

## 2019-12-05 ENCOUNTER — TREATMENT (OUTPATIENT)
Dept: PHYSICAL THERAPY | Facility: CLINIC | Age: 64
End: 2019-12-05

## 2019-12-05 DIAGNOSIS — G89.29 CHRONIC PAIN OF LEFT KNEE: Primary | ICD-10-CM

## 2019-12-05 DIAGNOSIS — M25.562 CHRONIC PAIN OF LEFT KNEE: Primary | ICD-10-CM

## 2019-12-05 DIAGNOSIS — Z96.652 PRESENCE OF LEFT ARTIFICIAL KNEE JOINT: ICD-10-CM

## 2019-12-05 PROCEDURE — 97140 MANUAL THERAPY 1/> REGIONS: CPT | Performed by: PHYSICAL THERAPIST

## 2019-12-05 PROCEDURE — 97110 THERAPEUTIC EXERCISES: CPT | Performed by: PHYSICAL THERAPIST

## 2019-12-05 PROCEDURE — 97014 ELECTRIC STIMULATION THERAPY: CPT | Performed by: PHYSICAL THERAPIST

## 2019-12-05 NOTE — PROGRESS NOTES
"     Physical Therapy Daily Progress Note    Patient: Adela Dowell   : 1955  Diagnosis/ICD-10 Code:  Chronic pain of left knee [M25.562, G89.29]  Referring practitioner: Milton Castro MD  Date of Initial Visit: Type: THERAPY  Noted: 11/15/2019  Today's Date: 2019  Patient seen for 7 sessions             Subjective Patient doing well.  No new complaints today.  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  She goes back to [facility] 19 and hopes to return to work.  Able to walk up steps reciprocally.      Objective   See Exercise, Manual, and Modality Logs for complete treatment. Increased step height to 8\" for step up.  Able to half kneel on foam and actually put L knee on foam today.       Assessment/Plan  Patient independent with HEP in 2 weeks - MET  Tolerate 10 min Nustep in 2 weeks - MET  Able to return to driving in 2 weeks - NOT MET  Able to ambulate independently without AD and minimal antalgia in 2 weeks - NOT MET  Improve function as evidenced by Oxford knee score of 20% or less by discharge (85% impairment initially) - NOT MET  Able to return to riding horses by discharge - NOT MET  Perform 10 reps of repeated sit to stand without arms in 30 seconds by discharge - MET     Progress per Plan of Care           Timed:         Manual Therapy:    10     mins  61897;     Therapeutic Exercise:    20     mins  62368;     Neuromuscular Manju:        mins  71143;    Therapeutic Activity:          mins  82535;     Gait Training:           mins  90989;     Ultrasound:          mins  01015;    Ionto                                   mins   93170  Self Care                            mins   13999      Un-Timed:  Electrical Stimulation:    15     mins  86189 ( );  Dry Needling          mins self-pay  Traction          mins 98352  Low Eval          Mins  42954  Mod Eval          Mins  14078  High Eval         "                    Mins  03121  [facility]                   mins  61494    Timed Treatment:   30   mins   Total Treatment:     45   mins    Robin A Sprigler, PT  Physical Therapist

## 2019-12-09 ENCOUNTER — TREATMENT (OUTPATIENT)
Dept: PHYSICAL THERAPY | Facility: CLINIC | Age: 64
End: 2019-12-09

## 2019-12-09 DIAGNOSIS — M25.562 CHRONIC PAIN OF LEFT KNEE: Primary | ICD-10-CM

## 2019-12-09 DIAGNOSIS — G89.29 CHRONIC PAIN OF LEFT KNEE: Primary | ICD-10-CM

## 2019-12-09 DIAGNOSIS — Z96.652 PRESENCE OF LEFT ARTIFICIAL KNEE JOINT: ICD-10-CM

## 2019-12-09 PROCEDURE — 97014 ELECTRIC STIMULATION THERAPY: CPT | Performed by: PHYSICAL THERAPIST

## 2019-12-09 PROCEDURE — 97110 THERAPEUTIC EXERCISES: CPT | Performed by: PHYSICAL THERAPIST

## 2019-12-09 PROCEDURE — 97140 MANUAL THERAPY 1/> REGIONS: CPT | Performed by: PHYSICAL THERAPIST

## 2019-12-09 NOTE — PROGRESS NOTES
Physical Therapy Daily Progress Note    Patient: Adela Dowell   : 1955  Diagnosis/ICD-10 Code:  Chronic pain of left knee [M25.562, G89.29]  Referring practitioner: Milton Castro MD  Date of Initial Visit: Type: THERAPY  Noted: 11/15/2019  Today's Date: 2019  Patient seen for 8 sessions             Subjective Patient reports she overdid it last weekend with 2 Jessy parties and other events.  She noticed increased swelling and pain but is starting to feel better today.  She returns to Dr. Castro for 6 week follow up on 19.    Objective   See Exercise, Manual, and Modality Logs for complete treatment. Able to complete exercises in clinic without difficulty.  L knee flexion = 115-120 deg, able to perform normal sit to stand squat and full revolution on stationary bike. L knee extension = 0 deg.   She ambulates FWB without assistive device and only minimal antalgia.  Incision healing nicely.  Minimal swelling today after over-doing this past weekend.        Assessment/Plan  Patient independent with HEP in 2 weeks - MET  Tolerate 10 min Nustep in 2 weeks - MET  Able to return to driving in 2 weeks - NOT MET  Able to ambulate independently without AD and minimal antalgia in 2 weeks - NOT MET  Improve function as evidenced by Oxford knee score of 20% or less by discharge (85% impairment initially) - NOT MET  Able to return to riding horses by discharge - NOT MET  Perform 10 reps of repeated sit to stand without arms in 30 seconds by discharge - MET     Progress per Plan of Care           Timed:         Manual Therapy:    10     mins  05062;     Therapeutic Exercise:    20     mins  99624;     Neuromuscular Manju:        mins  83244;    Therapeutic Activity:          mins  65620;     Gait Training:           mins  46691;     Ultrasound:          mins  64418;    Ionto                                   mins   48700  Self Care                            mins   93238      Un-Timed:  Electrical  Stimulation:    15     mins  81889 ( );  Dry Needling          mins self-pay  Traction          mins 75989  Low Eval          Mins  00626  Mod Eval          Mins  14123  High Eval                            Mins  48008  Canalith Repos                   mins  05880    Timed Treatment:   30   mins   Total Treatment:     45   mins    Robin A Sprigler, PT  Physical Therapist

## 2019-12-12 ENCOUNTER — TREATMENT (OUTPATIENT)
Dept: PHYSICAL THERAPY | Facility: CLINIC | Age: 64
End: 2019-12-12

## 2019-12-12 DIAGNOSIS — Z96.652 PRESENCE OF LEFT ARTIFICIAL KNEE JOINT: ICD-10-CM

## 2019-12-12 DIAGNOSIS — M25.562 CHRONIC PAIN OF LEFT KNEE: Primary | ICD-10-CM

## 2019-12-12 DIAGNOSIS — G89.29 CHRONIC PAIN OF LEFT KNEE: Primary | ICD-10-CM

## 2019-12-12 PROCEDURE — 97014 ELECTRIC STIMULATION THERAPY: CPT | Performed by: PHYSICAL THERAPIST

## 2019-12-12 PROCEDURE — 97140 MANUAL THERAPY 1/> REGIONS: CPT | Performed by: PHYSICAL THERAPIST

## 2019-12-12 PROCEDURE — 97110 THERAPEUTIC EXERCISES: CPT | Performed by: PHYSICAL THERAPIST

## 2019-12-12 NOTE — PROGRESS NOTES
Physical Therapy Daily Progress Note    Patient: Adela Dowell   : 1955  Diagnosis/ICD-10 Code:  Chronic pain of left knee [M25.562, G89.29]  Referring practitioner: Milton Castro MD  Date of Initial Visit: Type: THERAPY  Noted: 11/15/2019  Today's Date: 2019  Patient seen for 9 sessions             Subjective  Patient has completed 9 sessions of PT with excellent compliance and attendance.  She voices readiness for discharge after seeing MD today and being released to return to work Monday.  He was very pleased with her progress.    Objective   See Exercise, Manual, and Modality Logs for complete treatment.  L knee flexion = 115 deg, extension = 0 deg.  Able to complete 12 inch step up well.  Increased leg press to 60#.  Patient has not yet returned to riding horses but plans to progress to it gradually.      Assessment/Plan  Patient independent with HEP in 2 weeks - MET  Tolerate 10 min Nustep in 2 weeks - MET  Able to return to driving in 2 weeks - MET  Able to ambulate independently without AD and minimal antalgia in 2 weeks - MET  Improve function as evidenced by Oxford knee score of 20% or less by discharge (85% impairment initially) - MET  Able to return to riding horses by discharge - NOT MET  Perform 10 reps of repeated sit to stand without arms in 30 seconds by discharge - MET     Plan to discharge to HEP and self-management.           Timed:         Manual Therapy:    10     mins  41364;     Therapeutic Exercise:    20     mins  98034;     Neuromuscular Manju:        mins  78432;    Therapeutic Activity:          mins  46918;     Gait Training:           mins  63059;     Ultrasound:          mins  89487;    Ionto                                   mins   04883  Self Care                            mins   16390      Un-Timed:  Electrical Stimulation:    15     mins  82991 ( );  Dry Needling          mins self-pay  Traction          mins 69082  Low Eval          Mins   64749  Mod Eval          Mins  25542  High Eval                            Mins  03164  Canalith Repos                   mins  95568    Timed Treatment:   30   mins   Total Treatment:     45   mins    Robin A Sprigler, PT  Physical Therapist

## 2020-01-28 ENCOUNTER — OFFICE VISIT (OUTPATIENT)
Dept: FAMILY MEDICINE CLINIC | Facility: CLINIC | Age: 65
End: 2020-01-28

## 2020-01-28 VITALS
WEIGHT: 195.8 LBS | BODY MASS INDEX: 29.67 KG/M2 | RESPIRATION RATE: 18 BRPM | SYSTOLIC BLOOD PRESSURE: 140 MMHG | TEMPERATURE: 98.3 F | OXYGEN SATURATION: 96 % | HEART RATE: 77 BPM | DIASTOLIC BLOOD PRESSURE: 82 MMHG | HEIGHT: 68 IN

## 2020-01-28 DIAGNOSIS — E66.3 OVERWEIGHT: ICD-10-CM

## 2020-01-28 DIAGNOSIS — J06.9 VIRAL UPPER RESPIRATORY TRACT INFECTION: Primary | ICD-10-CM

## 2020-01-28 DIAGNOSIS — J01.90 ACUTE BACTERIAL SINUSITIS: ICD-10-CM

## 2020-01-28 DIAGNOSIS — G44.209 TENSION HEADACHE: ICD-10-CM

## 2020-01-28 DIAGNOSIS — B96.89 ACUTE BACTERIAL SINUSITIS: ICD-10-CM

## 2020-01-28 PROCEDURE — 99213 OFFICE O/P EST LOW 20 MIN: CPT | Performed by: FAMILY MEDICINE

## 2020-01-28 RX ORDER — HYDROCODONE BITARTRATE AND ACETAMINOPHEN 10; 325 MG/1; MG/1
1 TABLET ORAL EVERY 8 HOURS PRN
Refills: 0 | COMMUNITY
Start: 2019-11-17 | End: 2020-09-18

## 2020-01-28 RX ORDER — WARFARIN SODIUM 5 MG/1
TABLET ORAL
COMMUNITY
Start: 2019-11-09 | End: 2020-09-18

## 2020-01-28 RX ORDER — TRAMADOL HYDROCHLORIDE 50 MG/1
50 TABLET ORAL EVERY 8 HOURS PRN
Refills: 1 | COMMUNITY
Start: 2019-11-17 | End: 2020-09-18

## 2020-01-28 RX ORDER — AZITHROMYCIN 250 MG/1
TABLET, FILM COATED ORAL
Qty: 6 TABLET | Refills: 0 | Status: CANCELLED | OUTPATIENT
Start: 2020-01-28

## 2020-01-28 RX ORDER — OXYCODONE HYDROCHLORIDE AND ACETAMINOPHEN 5; 325 MG/1; MG/1
TABLET ORAL
COMMUNITY
Start: 2019-11-08 | End: 2020-09-18

## 2020-01-28 RX ORDER — AZITHROMYCIN 250 MG/1
TABLET, FILM COATED ORAL
Qty: 6 TABLET | Refills: 1 | Status: SHIPPED | OUTPATIENT
Start: 2020-01-28 | End: 2020-09-18

## 2020-01-28 RX ORDER — ONDANSETRON 4 MG/1
4 TABLET, FILM COATED ORAL EVERY 8 HOURS PRN
Refills: 0 | COMMUNITY
Start: 2019-10-31 | End: 2020-09-18

## 2020-01-28 NOTE — PROGRESS NOTES
Subjective   Adela Dowell is a 64 y.o. female.     Chief Complaint   Patient presents with   • URI       URI    This is a new problem. The current episode started in the past 7 days. The problem has been unchanged. There has been no fever. Associated symptoms include coughing, headaches, a plugged ear sensation, sinus pain and a sore throat. Pertinent negatives include no abdominal pain, chest pain or nausea. She has tried acetaminophen and decongestant for the symptoms. The treatment provided no relief.            I personally reviewed and updated the patient's allergies, medications, problem list, and past medical, surgical, social, and family history.     History reviewed. No pertinent family history.    Social History     Tobacco Use   • Smoking status: Never Smoker   • Smokeless tobacco: Never Used   Substance Use Topics   • Alcohol use: No     Frequency: Never   • Drug use: No       Past Surgical History:   Procedure Laterality Date   • ROTATOR CUFF REPAIR Right 2011    x2   • SINUS SURGERY Bilateral 2000   • SPLENECTOMY     • TUBAL ABDOMINAL LIGATION         Patient Active Problem List   Diagnosis   • Moderate episode of recurrent major depressive disorder (CMS/HCC)   • Hypertension   • Disorder of bursae and tendons in shoulder region   • Depression   • Chronic headaches   • Arthritis   • Anemia   • Overweight   • Obesity   • H/O splenectomy   • Viral upper respiratory tract infection   • Acute bacterial sinusitis         Current Outpatient Medications:   •  escitalopram (LEXAPRO) 20 MG tablet, Take 1 tablet by mouth Every Night., Disp: , Rfl:   •  aspirin-acetaminophen-caffeine (EXCEDRIN MIGRAINE) 250-250-65 MG per tablet, Take 2 tablets by mouth Every 8 (Eight) Hours As Needed for Headache., Disp: , Rfl:   •  azithromycin (ZITHROMAX) 250 MG tablet, Take 2 tablets the first day, then 1 tablet daily for 4 days., Disp: 6 tablet, Rfl: 1  •  diclofenac (VOLTAREN) 75 MG EC tablet, Take 1 tablet by mouth 2 (Two)  "Times a Day As Needed., Disp: , Rfl:   •  HYDROcodone-acetaminophen (NORCO)  MG per tablet, Take 1 tablet by mouth Every 8 (Eight) Hours As Needed. for pain, Disp: , Rfl: 0  •  meloxicam (MOBIC) 15 MG tablet, Take 1 tablet by mouth As Needed., Disp: , Rfl:   •  ondansetron (ZOFRAN) 4 MG tablet, Take 4 mg by mouth Every 8 (Eight) Hours As Needed. for nausea, Disp: , Rfl: 0  •  oxyCODONE-acetaminophen (PERCOCET) 5-325 MG per tablet, , Disp: , Rfl:   •  tiZANidine (ZANAFLEX) 4 MG tablet, TAKE 1 TABLET BY MOUTH 3 TIMES A DAY AS NEEDED FOR MUSCLE SPASMS FOR UP TO 30 DAYS., Disp: 90 tablet, Rfl: 2  •  traMADol (ULTRAM) 50 MG tablet, Take 50 mg by mouth Every 8 (Eight) Hours As Needed. for pain, Disp: , Rfl: 1  •  warfarin (COUMADIN) 5 MG tablet, , Disp: , Rfl:          Review of Systems   Constitutional: Negative for chills and diaphoresis.   HENT: Positive for sore throat.    Eyes: Negative for visual disturbance.   Respiratory: Positive for cough. Negative for shortness of breath.    Cardiovascular: Negative for chest pain and palpitations.   Gastrointestinal: Negative for abdominal pain and nausea.   Endocrine: Negative for polydipsia and polyphagia.   Musculoskeletal: Negative for neck stiffness.   Skin: Negative for color change and pallor.   Neurological: Negative for seizures and syncope.   Hematological: Negative for adenopathy.       Objective   /82   Pulse 77   Temp 98.3 °F (36.8 °C)   Resp 18   Ht 173.4 cm (68.25\")   Wt 88.8 kg (195 lb 12.8 oz)   SpO2 96%   Breastfeeding No   BMI 29.55 kg/m²   Wt Readings from Last 3 Encounters:   01/28/20 88.8 kg (195 lb 12.8 oz)   09/23/19 93.5 kg (206 lb 3.2 oz)   07/10/19 92.1 kg (203 lb)     Physical Exam      Assessment/Plan      Acute bacterial sinusitis.  Start antibiotics.  Continue antihistamine, nasal spray.  Call return if fever or worsening symptoms  Osteoarthritis knee.  Clinically improved status post replacement last fall.  Advancing " activity.    Problem List Items Addressed This Visit        Unprioritized    Overweight    Viral upper respiratory tract infection - Primary    Acute bacterial sinusitis    Relevant Medications    azithromycin (ZITHROMAX) 250 MG tablet              Expected course, medications, and adverse effects discussed.  Call or return if worsening or persistent symptoms.

## 2020-01-29 RX ORDER — TIZANIDINE 4 MG/1
TABLET ORAL
Qty: 90 TABLET | Refills: 2 | Status: SHIPPED | OUTPATIENT
Start: 2020-01-29 | End: 2020-09-18

## 2020-09-18 ENCOUNTER — OFFICE VISIT (OUTPATIENT)
Dept: FAMILY MEDICINE CLINIC | Facility: CLINIC | Age: 65
End: 2020-09-18

## 2020-09-18 VITALS
HEIGHT: 67 IN | SYSTOLIC BLOOD PRESSURE: 121 MMHG | DIASTOLIC BLOOD PRESSURE: 72 MMHG | TEMPERATURE: 95 F | WEIGHT: 195.4 LBS | HEART RATE: 86 BPM | BODY MASS INDEX: 30.67 KG/M2 | OXYGEN SATURATION: 95 %

## 2020-09-18 DIAGNOSIS — F33.2 SEVERE EPISODE OF RECURRENT MAJOR DEPRESSIVE DISORDER, WITHOUT PSYCHOTIC FEATURES (HCC): Primary | ICD-10-CM

## 2020-09-18 PROBLEM — B96.89 ACUTE BACTERIAL SINUSITIS: Status: RESOLVED | Noted: 2020-01-28 | Resolved: 2020-09-18

## 2020-09-18 PROBLEM — F32.A DEPRESSION: Status: RESOLVED | Noted: 2019-09-23 | Resolved: 2020-09-18

## 2020-09-18 PROBLEM — J06.9 VIRAL UPPER RESPIRATORY TRACT INFECTION: Status: RESOLVED | Noted: 2020-01-28 | Resolved: 2020-09-18

## 2020-09-18 PROBLEM — J01.90 ACUTE BACTERIAL SINUSITIS: Status: RESOLVED | Noted: 2020-01-28 | Resolved: 2020-09-18

## 2020-09-18 PROCEDURE — 99214 OFFICE O/P EST MOD 30 MIN: CPT | Performed by: FAMILY MEDICINE

## 2020-09-18 RX ORDER — DULOXETIN HYDROCHLORIDE 30 MG/1
CAPSULE, DELAYED RELEASE ORAL
COMMUNITY
Start: 2020-02-09 | End: 2020-09-18 | Stop reason: SDUPTHER

## 2020-09-18 RX ORDER — DULOXETIN HYDROCHLORIDE 60 MG/1
60 CAPSULE, DELAYED RELEASE ORAL NIGHTLY
Qty: 30 CAPSULE | Refills: 2 | Status: SHIPPED | OUTPATIENT
Start: 2020-09-18 | End: 2020-10-16 | Stop reason: SDUPTHER

## 2020-09-18 RX ORDER — ESCITALOPRAM OXALATE 20 MG/1
20 TABLET ORAL NIGHTLY
Qty: 30 TABLET | Refills: 2 | Status: SHIPPED | OUTPATIENT
Start: 2020-09-18 | End: 2020-10-16 | Stop reason: SDUPTHER

## 2020-09-18 NOTE — PATIENT INSTRUCTIONS
Suicidal Feelings: How to Help Yourself  Suicide is when you end your own life. There are many things you can do to help yourself feel better when struggling with these feelings. Many services and people are available to support you and others who struggle with similar feelings.   If you ever feel like you may hurt yourself or others, or have thoughts about taking your own life, get help right away. To get help:  · Call your local emergency services (911 in the [place]  · The [facility] and human services helpline (211 in the [place]  · Go to your nearest emergency department.  · Call a suicide hotline to speak with a trained counselor. The following suicide hotlines are available in the [place]:  ? 4-690-019-TALK (1-477.403.7404).  ? 7-299-CRYZGTY (1-638.452.8762).  ? 1-501.773.9372. This is a hotline for [place] speakers.  ? 1-530.274.6346. This is a hotline for TTY users.  ? 9-031-0-U-SOFIA (1-147.632.1201). This is a hotline for lesbian, easton, bisexual, transgender, or questioning youth.  ? For a list of hotlines in [place], visit www.suicide.org/hotlines/international/cvmkol-oizhuso-xjhbdvvd.html  · Contact a crisis center or a local suicide prevention center. To find a crisis center or suicide prevention center:  ? Call your local hospital, clinic, community service organization, mental health center, social service provider, or health department. Ask for help with connecting to a crisis center.  ? For a list of crisis centers in the [place], visit: suicidepreventionlifeline.org  ? For a list of crisis centers in [place], visit: suicideprevention.ca  How to help yourself feel better    · Promise yourself that you will not do anything extreme when you have suicidal feelings. Remember, there is hope. Many people have gotten through suicidal thoughts and feelings, and you can too. If you have had these feelings before, remind yourself that you can get through them again.  · Let family,  friends, teachers, or counselors know how you are feeling. Try not to separate yourself from those who care about you and want to help you. Talk with someone every day, even if you do not feel sociable. Face-to-face conversation is best to help them understand your feelings.  · Contact a mental health care provider and work with this person regularly.  · Make a safety plan that you can follow during a crisis. Include phone numbers of suicide prevention hotlines, mental health professionals, and trusted friends and family members you can call during an emergency. Save these numbers on your phone.  · If you are thinking of taking a lot of medicine, give your medicine to someone who can give it to you as prescribed. If you are on antidepressants and are concerned you will overdose, tell your health care provider so that he or she can give you safer medicines.  · Try to stick to your routines. Follow a schedule every day. Make self-care a priority.  · Make a list of realistic goals, and cross them off when you achieve them. Accomplishments can give you a sense of worth.  · Wait until you are feeling better before doing things that you find difficult or unpleasant.  · Do things that you have always enjoyed to take your mind off your feelings. Try reading a book, or listening to or playing music. Spending time outside, in nature, may help you feel better.  Follow these instructions at home:    · Visit your primary health care provider every year for a checkup.  · Work with a mental health care provider as needed.  · Eat a well-balanced diet, and eat regular meals.  · Get plenty of rest.  · Exercise if you are able. Just 30 minutes of exercise each day can help you feel better.  · Take over-the-counter and prescription medicines only as told by your health care provider. Ask your mental health care provider about the possible side effects of any medicines you are taking.  · Do not use alcohol or drugs, and remove these  substances from your home.  · Remove weapons, poisons, knives, and other deadly items from your home.  General recommendations  · Keep your living space well lit.  · When you are feeling well, write yourself a letter with tips and support that you can read when you are not feeling well.  · Remember that life's difficulties can be sorted out with help. Conditions can be treated, and you can learn behaviors and ways of thinking that will help you.  Where to find more information  · National Suicide Prevention Lifeline: www.suicidepreventionlifeline.org  · Hopeline: www.hopeline.MFG.com  · American Foundation for Suicide Prevention: www.afsp.org  · The Tian Project (for lesbian, easton, bisexual, transgender, or questioning youth): www.thetrevorproject.org  Contact a health care provider if:  · You feel as though you are a burden to others.  · You feel agitated, angry, vengeful, or have extreme mood swings.  · You have withdrawn from family and friends.  Get help right away if:  · You are talking about suicide or wishing to die.  · You start making plans for how to commit suicide.  · You feel that you have no reason to live.  · You start making plans for putting your affairs in order, saying goodbye, or giving your possessions away.  · You feel guilt, shame, or unbearable pain, and it seems like there is no way out.  · You are frequently using drugs or alcohol.  · You are engaging in risky behaviors that could lead to death.  If you have any of these symptoms, get help right away. Call emergency services, go to your nearest emergency department or crisis center, or call a suicide crisis helpline.  Summary  · Suicide is when you take your own life.  · Promise yourself that you will not do anything extreme when you have suicidal feelings.  · Let family, friends, teachers, or counselors know how you are feeling.  · Get help right away if you feel as though life is getting too tough to handle and you are thinking about  suicide.  This information is not intended to replace advice given to you by your health care provider. Make sure you discuss any questions you have with your health care provider.  Document Released: 06/23/2004 Document Revised: 04/09/2020 Document Reviewed: 07/31/2018  [employer]vier Patient Education © 2020 [employer]

## 2020-10-16 ENCOUNTER — OFFICE VISIT (OUTPATIENT)
Dept: FAMILY MEDICINE CLINIC | Facility: CLINIC | Age: 65
End: 2020-10-16

## 2020-10-16 VITALS
HEIGHT: 66 IN | SYSTOLIC BLOOD PRESSURE: 126 MMHG | TEMPERATURE: 97.5 F | HEART RATE: 93 BPM | DIASTOLIC BLOOD PRESSURE: 78 MMHG | WEIGHT: 196.2 LBS | OXYGEN SATURATION: 95 % | BODY MASS INDEX: 31.53 KG/M2

## 2020-10-16 DIAGNOSIS — F33.1 MODERATE EPISODE OF RECURRENT MAJOR DEPRESSIVE DISORDER (HCC): Primary | ICD-10-CM

## 2020-10-16 PROCEDURE — 99213 OFFICE O/P EST LOW 20 MIN: CPT | Performed by: FAMILY MEDICINE

## 2020-10-16 RX ORDER — DULOXETIN HYDROCHLORIDE 60 MG/1
60 CAPSULE, DELAYED RELEASE ORAL NIGHTLY
Qty: 90 CAPSULE | Refills: 2 | Status: SHIPPED | OUTPATIENT
Start: 2020-10-16 | End: 2021-05-05 | Stop reason: SDUPTHER

## 2020-10-16 RX ORDER — ESCITALOPRAM OXALATE 20 MG/1
20 TABLET ORAL NIGHTLY
Qty: 90 TABLET | Refills: 2 | Status: SHIPPED | OUTPATIENT
Start: 2020-10-16 | End: 2021-04-25

## 2020-10-16 NOTE — PROGRESS NOTES
Chief Complaint   Patient presents with   • Depression   • Med Management       Subjective   Adela Dowell is a 65 y.o. female.     Depression  Visit Type: follow-up  Patient presents with the following symptoms: decreased concentration (better), insomnia and nervousness/anxiety (better).  Patient is not experiencing: confusion, depressed mood, irritability, palpitations, shortness of breath, suicidal ideas and weight loss.  Frequency of symptoms: most days   Severity: moderate   Sleep per night: 7 hours  Sleep quality: good  Nighttime awakenings: several  Compliance with medications:  %        Answers for HPI/ROS submitted by the patient on 10/14/2020   What is the primary reason for your visit?: Other  Please describe your symptoms.: Follow up from appointment concerning depression and change in meds.  Have you had these symptoms before?: Yes  How long have you been having these symptoms?: Greater than 2 weeks  Please list any medications you are currently taking for this condition.: Cymbalta, Lexapro  Please describe any probable cause for these symptoms. : Stress, anxiety    Patient is here today to f/u on 9/18/2020 visit for severe depression.    Patient states she has been having suicidal thoughts.  She reports that suicide does run in her family and she knows the stress and heartache for the family that comes after, and this is keeping her from acting on her thoughts.  That her last thought off suicide was weeks ago.  She does not have an active plan at this time.  She does follow with Dr. Dillard.  Last visit about January 2020.  Patient reports being depressed since a child and medications only help for a little while.  She has been to counseling before, and this helped.    At her 9/18/2020 visit, her medications were adjusted by me and she was advised to follow-up with her psychiatrist.  Duloxetine was increased to 60 mg daily.  Within 2 weeks, she was feeling much better.     She had COVID testing  due to possible exposure at work, and tested negative.    I have reviewed and updated her medications, medical history and problem list during today's office visit.       Past Medical History :  Active Ambulatory Problems     Diagnosis Date Noted   • Moderate episode of recurrent major depressive disorder (CMS/HCC) 02/14/2019   • Hypertension 02/14/2019   • Disorder of bursae and tendons in shoulder region 09/23/2019   • Chronic headaches 02/14/2019   • Arthritis 02/14/2019   • Anemia 02/14/2019   • Overweight 09/23/2019   • Obesity 09/23/2019   • H/O splenectomy 09/23/2019     Resolved Ambulatory Problems     Diagnosis Date Noted   • Depression 09/23/2019   • Viral upper respiratory tract infection 01/28/2020   • Acute bacterial sinusitis 01/28/2020     Past Medical History:   Diagnosis Date   • Allergic    • Anxiety    • Headache    • Presence of left artificial knee joint        Medication List:    Current Outpatient Medications:   •  aspirin-acetaminophen-caffeine (EXCEDRIN MIGRAINE) 250-250-65 MG per tablet, Take 2 tablets by mouth Every 8 (Eight) Hours As Needed for Headache., Disp: , Rfl:   •  CBD (cannabidiol) oral oil, Take 1 mL by mouth Daily. .75 ML  Patient places it under her tongue., Disp: , Rfl:   •  DULoxetine (CYMBALTA) 60 MG capsule, Take 1 capsule by mouth Every Night., Disp: 30 capsule, Rfl: 2  •  escitalopram (LEXAPRO) 20 MG tablet, Take 1 tablet by mouth Every Night., Disp: 30 tablet, Rfl: 2  •  Misc Natural Products (OSTEO BI-FLEX ADV TRIPLE ST PO), Take  by mouth Daily., Disp: , Rfl:     Allergies   Allergen Reactions   • Levofloxacin Swelling and Myalgia     Joint swelling       Social History     Tobacco Use   • Smoking status: Never Smoker   • Smokeless tobacco: Never Used   Substance Use Topics   • Alcohol use: No     Frequency: Never           Review of Systems   Constitutional: Negative for chills, fever, irritability and unexpected weight loss.   HENT: Negative for trouble swallowing.   "  Eyes: Negative for blurred vision, double vision and visual disturbance.   Respiratory: Negative for shortness of breath.    Cardiovascular: Negative for chest pain and palpitations.   Gastrointestinal: Negative for abdominal pain, diarrhea and vomiting.   Genitourinary: Negative for difficulty urinating.   Neurological: Negative for dizziness, tremors, seizures, syncope, weakness, numbness, headache and confusion.   Psychiatric/Behavioral: Positive for decreased concentration (better), sleep disturbance (better) and stress (better). Negative for hallucinations, self-injury, suicidal ideas and depressed mood. The patient is nervous/anxious (better) and has insomnia.          Objective   Vitals:    10/16/20 1029   BP: 126/78   Pulse: 93   Temp: 97.5 °F (36.4 °C)   TempSrc: Temporal   SpO2: 95%   Weight: 89 kg (196 lb 3.2 oz)   Height: 167.6 cm (66\")     Body mass index is 31.67 kg/m².    Physical Exam  Constitutional:       General: She is not in acute distress.     Appearance: Normal appearance. She is not ill-appearing.   Eyes:      Conjunctiva/sclera: Conjunctivae normal.   Cardiovascular:      Rate and Rhythm: Normal rate and regular rhythm.      Heart sounds: Normal heart sounds. No murmur.   Pulmonary:      Effort: Pulmonary effort is normal.      Breath sounds: Normal breath sounds.   Skin:     General: Skin is warm.   Neurological:      General: No focal deficit present.      Mental Status: She is alert and oriented to person, place, and time. Mental status is at baseline.   Psychiatric:         Attention and Perception: Attention normal.         Mood and Affect: Mood and affect normal. Mood is not anxious or depressed. Affect is not labile, tearful or inappropriate.         Speech: Speech normal. Speech is not rapid and pressured or tangential.         Behavior: Behavior normal. Behavior is not agitated, slowed, aggressive or hyperactive. Behavior is cooperative.         Thought Content: Thought content " normal. Thought content is not delusional. Thought content does not include homicidal or suicidal ideation. Thought content does not include homicidal or suicidal plan.         Cognition and Memory: Cognition and memory normal.         Judgment: Judgment normal. Judgment is not inappropriate.     PHQ-9 Depression Screening  Little interest or pleasure in doing things? 1   Feeling down, depressed, or hopeless? 1   Trouble falling or staying asleep, or sleeping too much? 1   Feeling tired or having little energy? 1   Poor appetite or overeating? 0   Feeling bad about yourself - or that you are a failure or have let yourself or your family down? 1   Trouble concentrating on things, such as reading the newspaper or watching television? 0   Moving or speaking so slowly that other people could have noticed? Or the opposite - being so fidgety or restless that you have been moving around a lot more than usual? 0   Thoughts that you would be better off dead, or of hurting yourself in some way? 0   PHQ-9 Total Score 5   If you checked off any problems, how difficult have these problems made it for you to do your work, take care of things at home, or get along with other people?       PHQ9 Score has decreased from 25 at last visit to 5 today.      Assessment/Plan     Diagnoses and all orders for this visit:    1. Moderate episode of recurrent major depressive disorder (CMS/HCC) (Primary)  -     escitalopram (LEXAPRO) 20 MG tablet; Take 1 tablet by mouth Every Night.  Dispense: 90 tablet; Refill: 2  -     DULoxetine (CYMBALTA) 60 MG capsule; Take 1 capsule by mouth Every Night.  Dispense: 90 capsule; Refill: 2    She is feeling much better.  No reported medication side effects/interactions.  Continue current dosage.  Refills given.    Influenza immunization was not given due to patient refusal, she is having done at pharmacy later today.      Return if symptoms worsen or fail to improve.     I wore protective equipment  throughout this patient encounter to include mask and eye protection. Hand hygiene was performed before donning protective equipment and after removal when leaving the room.  Patient also wore a mask.

## 2020-11-06 ENCOUNTER — OFFICE VISIT (OUTPATIENT)
Dept: FAMILY MEDICINE CLINIC | Facility: CLINIC | Age: 65
End: 2020-11-06

## 2020-11-06 VITALS
RESPIRATION RATE: 16 BRPM | OXYGEN SATURATION: 99 % | TEMPERATURE: 97.5 F | BODY MASS INDEX: 31.88 KG/M2 | WEIGHT: 198.4 LBS | HEART RATE: 91 BPM | SYSTOLIC BLOOD PRESSURE: 140 MMHG | HEIGHT: 66 IN | DIASTOLIC BLOOD PRESSURE: 70 MMHG

## 2020-11-06 DIAGNOSIS — Z23 NEED FOR SHINGLES VACCINE: ICD-10-CM

## 2020-11-06 DIAGNOSIS — R31.9 HEMATURIA, UNSPECIFIED TYPE: Primary | ICD-10-CM

## 2020-11-06 LAB
BILIRUB BLD-MCNC: ABNORMAL MG/DL
CLARITY, POC: ABNORMAL
COLOR UR: ABNORMAL
GLUCOSE UR STRIP-MCNC: NEGATIVE MG/DL
KETONES UR QL: NEGATIVE
LEUKOCYTE EST, POC: ABNORMAL
NITRITE UR-MCNC: NEGATIVE MG/ML
PH UR: 5 [PH] (ref 5–8)
PROT UR STRIP-MCNC: NEGATIVE MG/DL
RBC # UR STRIP: ABNORMAL /UL
SP GR UR: 1.02 (ref 1–1.03)
UROBILINOGEN UR QL: NORMAL

## 2020-11-06 PROCEDURE — 90750 HZV VACC RECOMBINANT IM: CPT | Performed by: FAMILY MEDICINE

## 2020-11-06 PROCEDURE — 99213 OFFICE O/P EST LOW 20 MIN: CPT | Performed by: FAMILY MEDICINE

## 2020-11-06 PROCEDURE — 81002 URINALYSIS NONAUTO W/O SCOPE: CPT | Performed by: FAMILY MEDICINE

## 2020-11-06 PROCEDURE — 90471 IMMUNIZATION ADMIN: CPT | Performed by: FAMILY MEDICINE

## 2020-11-06 RX ORDER — SULFAMETHOXAZOLE AND TRIMETHOPRIM 800; 160 MG/1; MG/1
1 TABLET ORAL 2 TIMES DAILY
Qty: 14 TABLET | Refills: 0 | Status: SHIPPED | OUTPATIENT
Start: 2020-11-06 | End: 2020-11-18 | Stop reason: SDUPTHER

## 2020-11-06 NOTE — PROGRESS NOTES
Chief Complaint   Patient presents with   • Blood in Urine       Endy Dowell is a 65 y.o. female.     Blood in Urine  This is a new problem. Episode onset: 11/02/2020. The problem has been gradually improving since onset. She reports no clotting in her urine stream. She is experiencing no pain. She describes her urine color as dark red. Irritative symptoms do not include frequency, nocturia or urgency. Obstructive symptoms do not include dribbling, incomplete emptying, an intermittent stream, a slower stream, straining or a weak stream. Pertinent negatives include no abdominal pain, chills, fever or flank pain. She is sexually active.          I have reviewed and updated her medications, medical history and problem list during today's office visit.       Past Medical History :  Active Ambulatory Problems     Diagnosis Date Noted   • Moderate episode of recurrent major depressive disorder (CMS/HCC) 02/14/2019   • Hypertension 02/14/2019   • Disorder of bursae and tendons in shoulder region 09/23/2019   • Chronic headaches 02/14/2019   • Arthritis 02/14/2019   • Anemia 02/14/2019   • Overweight 09/23/2019   • Obesity 09/23/2019   • H/O splenectomy 09/23/2019     Resolved Ambulatory Problems     Diagnosis Date Noted   • Depression 09/23/2019   • Viral upper respiratory tract infection 01/28/2020   • Acute bacterial sinusitis 01/28/2020     Past Medical History:   Diagnosis Date   • Allergic    • Anxiety    • Headache    • Presence of left artificial knee joint        Medication List:    Current Outpatient Medications:   •  aspirin-acetaminophen-caffeine (EXCEDRIN MIGRAINE) 250-250-65 MG per tablet, Take 2 tablets by mouth Every 8 (Eight) Hours As Needed for Headache., Disp: , Rfl:   •  CBD (cannabidiol) oral oil, Take 1 mL by mouth Daily. .75 ML  Patient places it under her tongue., Disp: , Rfl:   •  DULoxetine (CYMBALTA) 60 MG capsule, Take 1 capsule by mouth Every Night., Disp: 90 capsule, Rfl: 2  •   "escitalopram (LEXAPRO) 20 MG tablet, Take 1 tablet by mouth Every Night., Disp: 90 tablet, Rfl: 2  •  Misc Natural Products (OSTEO BI-FLEX ADV TRIPLE ST PO), Take  by mouth Daily., Disp: , Rfl:     Allergies   Allergen Reactions   • Levofloxacin Swelling and Myalgia     Joint swelling       Social History     Tobacco Use   • Smoking status: Never Smoker   • Smokeless tobacco: Never Used   Substance Use Topics   • Alcohol use: No     Frequency: Never       Review of Systems   Constitutional: Negative for chills, fever and unexpected weight loss.   Eyes: Negative for visual disturbance.   Respiratory: Negative for shortness of breath.    Cardiovascular: Negative for leg swelling.   Gastrointestinal: Negative for abdominal pain and blood in stool.   Endocrine: Negative for polydipsia.   Genitourinary: Positive for hematuria. Negative for difficulty urinating, flank pain, frequency, incomplete emptying, nocturia, urgency, vaginal bleeding and vaginal discharge.   Neurological: Negative for weakness.   Hematological: Does not bruise/bleed easily.         Objective   Vitals:    11/06/20 1616   BP: 140/70   BP Location: Right arm   Patient Position: Sitting   Cuff Size: Large Adult   Pulse: 91   Resp: 16   Temp: 97.5 °F (36.4 °C)   TempSrc: Skin   SpO2: 99%   Weight: 90 kg (198 lb 6.4 oz)   Height: 167.6 cm (66\")     Body mass index is 32.02 kg/m².    Physical Exam  Constitutional:       Appearance: Normal appearance. She is well-developed.   HENT:      Head: Normocephalic and atraumatic.   Eyes:      General: No scleral icterus.     Conjunctiva/sclera: Conjunctivae normal.   Neck:      Musculoskeletal: Normal range of motion and neck supple. No edema.   Cardiovascular:      Rate and Rhythm: Normal rate and regular rhythm.      Heart sounds: Normal heart sounds.   Pulmonary:      Effort: Pulmonary effort is normal.      Breath sounds: Normal breath sounds.   Abdominal:      General: Bowel sounds are normal. There is no " distension.      Palpations: Abdomen is soft.      Tenderness: There is no abdominal tenderness. There is no right CVA tenderness, left CVA tenderness, guarding or rebound.   Skin:     General: Skin is warm.      Capillary Refill: Capillary refill takes less than 2 seconds.      Findings: No rash.   Neurological:      General: No focal deficit present.      Mental Status: She is alert. Mental status is at baseline.   Psychiatric:         Mood and Affect: Mood normal.         Behavior: Behavior normal.         Thought Content: Thought content normal.         Judgment: Judgment normal.       Brief Urine Lab Results  (Last result in the past 365 days)      Color   Clarity   Blood   Leuk Est   Nitrite   Protein   CREAT   Urine HCG        11/06/20 1625 Red Cloudy 3+ 75 Tai/ul Negative Negative               Assessment/Plan     Diagnoses and all orders for this visit:    1. Hematuria, unspecified type (Primary)  -     POCT urinalysis dipstick, manual  -     Urine Culture - Urine, Urine, Random Void  -     sulfamethoxazole-trimethoprim (Bactrim DS) 800-160 MG per tablet; Take 1 tablet by mouth 2 (Two) Times a Day for 7 days.  Dispense: 14 tablet; Refill: 0    2. Need for shingles vaccine  -     Shingrix Vaccine    Urine sent for culture.  If no growth, plan for imaging.    Return if symptoms worsen or fail to improve.     I wore protective equipment throughout this patient encounter to include mask and eye protection. Hand hygiene was performed before donning protective equipment and after removal when leaving the room.  Patient also wore a mask.

## 2020-11-08 LAB
BACTERIA UR CULT: NORMAL
BACTERIA UR CULT: NORMAL

## 2020-11-18 ENCOUNTER — TELEPHONE (OUTPATIENT)
Dept: FAMILY MEDICINE CLINIC | Facility: CLINIC | Age: 65
End: 2020-11-18

## 2020-11-18 DIAGNOSIS — R31.9 HEMATURIA, UNSPECIFIED TYPE: ICD-10-CM

## 2020-11-18 RX ORDER — SULFAMETHOXAZOLE AND TRIMETHOPRIM 800; 160 MG/1; MG/1
1 TABLET ORAL 2 TIMES DAILY
Qty: 14 TABLET | Refills: 0 | Status: SHIPPED | OUTPATIENT
Start: 2020-11-18 | End: 2020-11-25

## 2020-11-18 NOTE — TELEPHONE ENCOUNTER
Called patient to let her know medication was called in for her and that if medication does or doesn't work to please call and let me know . Voicemail left for patient.

## 2020-11-18 NOTE — TELEPHONE ENCOUNTER
Patient called she is having blood return in her urine. She is wanting to know is she needs another round of antibiotic.Pharmacy is [facility] in [place]. [phone]

## 2020-12-02 ENCOUNTER — TELEPHONE (OUTPATIENT)
Dept: FAMILY MEDICINE CLINIC | Facility: CLINIC | Age: 65
End: 2020-12-02

## 2020-12-02 NOTE — TELEPHONE ENCOUNTER
Patient called and left a vm to call her back but didn't stats what was going on and or what she needed.     Called patient back no answer vm left for patient to call back and leave a vm with what she needed.

## 2020-12-07 ENCOUNTER — OFFICE VISIT (OUTPATIENT)
Dept: FAMILY MEDICINE CLINIC | Facility: CLINIC | Age: 65
End: 2020-12-07

## 2020-12-07 ENCOUNTER — HOSPITAL ENCOUNTER (OUTPATIENT)
Dept: GENERAL RADIOLOGY | Facility: HOSPITAL | Age: 65
Discharge: HOME | End: 2020-12-07
Admitting: FAMILY MEDICINE

## 2020-12-07 VITALS
HEIGHT: 66 IN | TEMPERATURE: 97.5 F | BODY MASS INDEX: 31.63 KG/M2 | HEART RATE: 89 BPM | DIASTOLIC BLOOD PRESSURE: 78 MMHG | OXYGEN SATURATION: 96 % | SYSTOLIC BLOOD PRESSURE: 127 MMHG | WEIGHT: 196.8 LBS

## 2020-12-07 DIAGNOSIS — R31.0 GROSS HEMATURIA: Primary | ICD-10-CM

## 2020-12-07 LAB
BILIRUB BLD-MCNC: NEGATIVE MG/DL
CLARITY, POC: ABNORMAL
COLOR UR: ABNORMAL
GLUCOSE UR STRIP-MCNC: NEGATIVE MG/DL
KETONES UR QL: NEGATIVE
LEUKOCYTE EST, POC: ABNORMAL
NITRITE UR-MCNC: NEGATIVE MG/ML
PH UR: 5 [PH] (ref 5–8)
PROT UR STRIP-MCNC: ABNORMAL MG/DL
RBC # UR STRIP: ABNORMAL /UL
SP GR UR: 1.02 (ref 1–1.03)
UROBILINOGEN UR QL: NORMAL

## 2020-12-07 PROCEDURE — 81003 URINALYSIS AUTO W/O SCOPE: CPT | Performed by: FAMILY MEDICINE

## 2020-12-07 PROCEDURE — 99213 OFFICE O/P EST LOW 20 MIN: CPT | Performed by: FAMILY MEDICINE

## 2020-12-07 PROCEDURE — 74018 RADEX ABDOMEN 1 VIEW: CPT

## 2020-12-07 RX ORDER — DULOXETIN HYDROCHLORIDE 30 MG/1
30 CAPSULE, DELAYED RELEASE ORAL DAILY
COMMUNITY
Start: 2020-10-28 | End: 2020-12-17

## 2020-12-07 NOTE — PROGRESS NOTES
Chief Complaint   Patient presents with   • Blood in Urine       Subjective   Adela Dowell is a 65 y.o. female.     Blood in Urine  This is a new problem. The current episode started more than 1 month ago (11/02/2020). The problem has been gradually improving (patient stats that is has been clear for that last week ) since onset. She describes the hematuria as gross hematuria. She reports no clotting in her urine stream. She is experiencing no pain. She describes her urine color as dark red. Irritative symptoms do not include frequency, nocturia or urgency. Obstructive symptoms do not include dribbling, incomplete emptying, an intermittent stream, a slower stream, straining or a weak stream. Associated symptoms include flank pain. Pertinent negatives include no abdominal pain, chills, fever or vomiting. She is sexually active.      Treatment has included 2 rounds of antibiotics.  Patient reports urine has been clear for 5 days.    Answers for HPI/ROS submitted by the patient on 12/5/2020   What is the primary reason for your visit?: Other  Please describe your symptoms.: Blood in urine  Have you had these symptoms before?: Yes  How long have you been having these symptoms?: Greater than 2 weeks  Please list any medications you are currently taking for this condition.: Same as listed  Please describe any probable cause for these symptoms. : Kidney stone?      I have reviewed and updated her medications, medical history and problem list during today's office visit.       Past Medical History :  Active Ambulatory Problems     Diagnosis Date Noted   • Moderate episode of recurrent major depressive disorder (CMS/[facility]) 02/14/2019   • Hypertension 02/14/2019   • Disorder of bursae and tendons in shoulder region 09/23/2019   • Chronic headaches 02/14/2019   • Arthritis 02/14/2019   • Anemia 02/14/2019   • Overweight 09/23/2019   • Obesity 09/23/2019   • H/O splenectomy 09/23/2019     Resolved Ambulatory Problems      Diagnosis Date Noted   • Depression 09/23/2019   • Viral upper respiratory tract infection 01/28/2020   • Acute bacterial sinusitis 01/28/2020     Past Medical History:   Diagnosis Date   • Allergic    • Anxiety    • Headache    • Presence of left artificial knee joint        Medication List:    Current Outpatient Medications:   •  aspirin-acetaminophen-caffeine (EXCEDRIN MIGRAINE) 250-250-65 MG per tablet, Take 2 tablets by mouth Every 8 (Eight) Hours As Needed for Headache., Disp: , Rfl:   •  CBD (cannabidiol) oral oil, Take 1 mL by mouth Daily. .75 ML  Patient places it under her tongue., Disp: , Rfl:   •  DULoxetine (CYMBALTA) 60 MG capsule, Take 1 capsule by mouth Every Night., Disp: 90 capsule, Rfl: 2  •  escitalopram (LEXAPRO) 20 MG tablet, Take 1 tablet by mouth Every Night., Disp: 90 tablet, Rfl: 2  •  Misc Natural Products (OSTEO BI-FLEX ADV TRIPLE ST PO), Take  by mouth Daily., Disp: , Rfl:     Allergies   Allergen Reactions   • Levofloxacin Swelling and Myalgia     Joint swelling       Social History     Tobacco Use   • Smoking status: Never Smoker   • Smokeless tobacco: Never Used   Substance Use Topics   • Alcohol use: No     Frequency: Never       Review of Systems   Constitutional: Negative for chills, fever and unexpected weight loss.   Eyes: Negative for visual disturbance.   Respiratory: Negative for shortness of breath.    Cardiovascular: Negative for leg swelling.   Gastrointestinal: Negative for abdominal pain, blood in stool, constipation, diarrhea and vomiting.   Endocrine: Negative for polydipsia.   Genitourinary: Positive for flank pain and hematuria. Negative for difficulty urinating, frequency, incomplete emptying, nocturia, urgency, vaginal bleeding and vaginal discharge.   Musculoskeletal: Positive for back pain (intermittent).   Neurological: Negative for weakness.   Hematological: Does not bruise/bleed easily.         Objective   Vitals:    12/07/20 0835   BP: 127/78   Pulse: 89  "  Temp: 97.5 °F (36.4 °C)   TempSrc: Temporal   SpO2: 96%   Weight: 89.3 kg (196 lb 12.8 oz)   Height: 167.6 cm (66\")     Body mass index is 31.76 kg/m².    Physical Exam  Constitutional:       Appearance: Normal appearance. She is well-developed.   HENT:      Head: Normocephalic and atraumatic.   Eyes:      General: No scleral icterus.     Conjunctiva/sclera: Conjunctivae normal.   Neck:      Musculoskeletal: Normal range of motion and neck supple. No edema.   Cardiovascular:      Rate and Rhythm: Normal rate and regular rhythm.      Heart sounds: Normal heart sounds.   Pulmonary:      Effort: Pulmonary effort is normal.      Breath sounds: Normal breath sounds.   Abdominal:      General: Bowel sounds are normal. There is no distension.      Palpations: Abdomen is soft.      Tenderness: There is abdominal tenderness in the right upper quadrant, right lower quadrant, left upper quadrant and left lower quadrant. There is no right CVA tenderness, left CVA tenderness, guarding or rebound.   Skin:     General: Skin is warm.      Capillary Refill: Capillary refill takes less than 2 seconds.      Findings: No rash.   Neurological:      General: No focal deficit present.      Mental Status: She is alert. Mental status is at baseline.   Psychiatric:         Mood and Affect: Mood normal.         Behavior: Behavior normal.         Thought Content: Thought content normal.         Judgment: Judgment normal.       Brief Urine Lab Results  (Last result in the past 365 days)      Color   Clarity   Blood   Leuk Est   Nitrite   Protein   CREAT   Urine HCG        12/07/20 0844 Dark Yellow Hazy 3+ 75 Tai/ul Negative 30 mg/dL               Assessment/Plan     Diagnoses and all orders for this visit:    1. Gross hematuria (Primary)  -     POCT urinalysis dipstick, multipro  -     XR Abdomen KUB      Proceed with KUB.  If KUB negative, plan for CT.    Return if symptoms worsen or fail to improve.     I wore protective equipment " throughout this patient encounter to include mask and eye protection. Hand hygiene was performed before donning protective equipment and after removal when leaving the room.  Patient also wore a mask.

## 2020-12-08 ENCOUNTER — TELEPHONE (OUTPATIENT)
Dept: FAMILY MEDICINE CLINIC | Facility: CLINIC | Age: 65
End: 2020-12-08

## 2020-12-08 DIAGNOSIS — R31.0 GROSS HEMATURIA: Primary | ICD-10-CM

## 2020-12-08 NOTE — TELEPHONE ENCOUNTER
----- Message from Adela Dowell sent at 12/7/2020  9:28 PM EST -----  Regarding: Test Results Question  Contact: [phone]  Hi Dr. Torres!  Based on the x-rays of my kidney I would like to go ahead with the CT scan.  Could you please schedule an appointment for it?  Thank you.  Adela

## 2020-12-16 ENCOUNTER — HOSPITAL ENCOUNTER (OUTPATIENT)
Dept: CT IMAGING | Facility: HOSPITAL | Age: 65
Discharge: HOME | End: 2020-12-16
Admitting: FAMILY MEDICINE

## 2020-12-16 DIAGNOSIS — R31.0 GROSS HEMATURIA: ICD-10-CM

## 2020-12-16 PROCEDURE — 74176 CT ABD & PELVIS W/O CONTRAST: CPT

## 2020-12-17 ENCOUNTER — TELEPHONE (OUTPATIENT)
Dept: FAMILY MEDICINE CLINIC | Facility: CLINIC | Age: 65
End: 2020-12-17

## 2020-12-17 DIAGNOSIS — N20.0 RENAL STONE: ICD-10-CM

## 2020-12-17 DIAGNOSIS — R31.0 GROSS HEMATURIA: Primary | ICD-10-CM

## 2020-12-17 NOTE — TELEPHONE ENCOUNTER
Spoke to pt 12/17/2020, 1:56pm advised pt of appt with [facility] office 12/18/2020, arrive 7:45am, appt 8am.  Paperwork faxed

## 2020-12-31 ENCOUNTER — HOSPITAL ENCOUNTER (OUTPATIENT)
Dept: CARDIOLOGY | Facility: HOSPITAL | Age: 65
Discharge: HOME | End: 2020-12-31

## 2020-12-31 ENCOUNTER — LAB (OUTPATIENT)
Dept: LAB | Facility: HOSPITAL | Age: 65
End: 2020-12-31

## 2020-12-31 ENCOUNTER — TRANSCRIBE ORDERS (OUTPATIENT)
Dept: ADMINISTRATIVE | Facility: HOSPITAL | Age: 65
End: 2020-12-31

## 2020-12-31 DIAGNOSIS — Z01.818 PREOP TESTING: ICD-10-CM

## 2020-12-31 DIAGNOSIS — N20.0 RENAL STONE: ICD-10-CM

## 2020-12-31 DIAGNOSIS — Z01.818 PREOP TESTING: Primary | ICD-10-CM

## 2020-12-31 LAB
ANION GAP SERPL CALCULATED.3IONS-SCNC: 8.6 MMOL/L (ref 5–15)
BASOPHILS # BLD AUTO: 0.06 10*3/MM3 (ref 0–0.2)
BASOPHILS NFR BLD AUTO: 0.7 % (ref 0–1.5)
BUN SERPL-MCNC: 13 MG/DL (ref 8–23)
BUN/CREAT SERPL: 19.4 (ref 7–25)
CALCIUM SPEC-SCNC: 9.3 MG/DL (ref 8.6–10.5)
CHLORIDE SERPL-SCNC: 102 MMOL/L (ref 98–107)
CO2 SERPL-SCNC: 30.4 MMOL/L (ref 22–29)
CREAT SERPL-MCNC: 0.67 MG/DL (ref 0.57–1)
DEPRECATED RDW RBC AUTO: 42.5 FL (ref 37–54)
EOSINOPHIL # BLD AUTO: 0.45 10*3/MM3 (ref 0–0.4)
EOSINOPHIL NFR BLD AUTO: 5 % (ref 0.3–6.2)
ERYTHROCYTE [DISTWIDTH] IN BLOOD BY AUTOMATED COUNT: 13.6 % (ref 12.3–15.4)
GFR SERPL CREATININE-BSD FRML MDRD: 89 ML/MIN/1.73
GLUCOSE SERPL-MCNC: 90 MG/DL (ref 65–99)
HCT VFR BLD AUTO: 43 % (ref 34–46.6)
HGB BLD-MCNC: 14.7 G/DL (ref 12–15.9)
IMM GRANULOCYTES # BLD AUTO: 0.03 10*3/MM3 (ref 0–0.05)
IMM GRANULOCYTES NFR BLD AUTO: 0.3 % (ref 0–0.5)
LYMPHOCYTES # BLD AUTO: 3.1 10*3/MM3 (ref 0.7–3.1)
LYMPHOCYTES NFR BLD AUTO: 34.3 % (ref 19.6–45.3)
MCH RBC QN AUTO: 29.6 PG (ref 26.6–33)
MCHC RBC AUTO-ENTMCNC: 34.2 G/DL (ref 31.5–35.7)
MCV RBC AUTO: 86.7 FL (ref 79–97)
MONOCYTES # BLD AUTO: 0.71 10*3/MM3 (ref 0.1–0.9)
MONOCYTES NFR BLD AUTO: 7.8 % (ref 5–12)
NEUTROPHILS NFR BLD AUTO: 4.7 10*3/MM3 (ref 1.7–7)
NEUTROPHILS NFR BLD AUTO: 51.9 % (ref 42.7–76)
NRBC BLD AUTO-RTO: 0 /100 WBC (ref 0–0.2)
PLATELET # BLD AUTO: 433 10*3/MM3 (ref 140–450)
PMV BLD AUTO: 10.5 FL (ref 6–12)
POTASSIUM SERPL-SCNC: 4.3 MMOL/L (ref 3.5–5.2)
RBC # BLD AUTO: 4.96 10*6/MM3 (ref 3.77–5.28)
SARS-COV-2 ORF1AB RESP QL NAA+PROBE: NOT DETECTED
SODIUM SERPL-SCNC: 141 MMOL/L (ref 136–145)
WBC # BLD AUTO: 9.05 10*3/MM3 (ref 3.4–10.8)

## 2020-12-31 PROCEDURE — 93005 ELECTROCARDIOGRAM TRACING: CPT | Performed by: UROLOGY

## 2020-12-31 PROCEDURE — C9803 HOPD COVID-19 SPEC COLLECT: HCPCS

## 2020-12-31 PROCEDURE — 36415 COLL VENOUS BLD VENIPUNCTURE: CPT

## 2020-12-31 PROCEDURE — U0004 COV-19 TEST NON-CDC HGH THRU: HCPCS

## 2020-12-31 PROCEDURE — 93010 ELECTROCARDIOGRAM REPORT: CPT | Performed by: INTERNAL MEDICINE

## 2020-12-31 PROCEDURE — 85025 COMPLETE CBC W/AUTO DIFF WBC: CPT

## 2020-12-31 PROCEDURE — 80048 BASIC METABOLIC PNL TOTAL CA: CPT

## 2021-01-08 LAB — QT INTERVAL: 399 MS

## 2021-01-12 ENCOUNTER — HOSPITAL ENCOUNTER (OUTPATIENT)
Dept: GENERAL RADIOLOGY | Facility: HOSPITAL | Age: 66
Discharge: HOME | End: 2021-01-12
Admitting: UROLOGY

## 2021-01-12 DIAGNOSIS — N20.0 CALCULUS, RENAL: ICD-10-CM

## 2021-01-12 PROCEDURE — 74018 RADEX ABDOMEN 1 VIEW: CPT

## 2021-02-22 ENCOUNTER — OFFICE VISIT (OUTPATIENT)
Dept: FAMILY MEDICINE CLINIC | Facility: CLINIC | Age: 66
End: 2021-02-22

## 2021-02-22 VITALS
WEIGHT: 200 LBS | TEMPERATURE: 96.9 F | HEIGHT: 68 IN | OXYGEN SATURATION: 98 % | HEART RATE: 85 BPM | BODY MASS INDEX: 30.31 KG/M2 | RESPIRATION RATE: 18 BRPM | DIASTOLIC BLOOD PRESSURE: 80 MMHG | SYSTOLIC BLOOD PRESSURE: 160 MMHG

## 2021-02-22 DIAGNOSIS — R35.0 URINARY FREQUENCY: Primary | ICD-10-CM

## 2021-02-22 DIAGNOSIS — E66.09 CLASS 1 OBESITY DUE TO EXCESS CALORIES WITH BODY MASS INDEX (BMI) OF 30.0 TO 30.9 IN ADULT, UNSPECIFIED WHETHER SERIOUS COMORBIDITY PRESENT: ICD-10-CM

## 2021-02-22 DIAGNOSIS — N30.01 ACUTE CYSTITIS WITH HEMATURIA: ICD-10-CM

## 2021-02-22 LAB
BILIRUB BLD-MCNC: NEGATIVE MG/DL
CLARITY, POC: CLEAR
COLOR UR: YELLOW
GLUCOSE UR STRIP-MCNC: NEGATIVE MG/DL
KETONES UR QL: NEGATIVE
LEUKOCYTE EST, POC: NEGATIVE
NITRITE UR-MCNC: NEGATIVE MG/ML
PH UR: 5 [PH] (ref 5–8)
PROT UR STRIP-MCNC: NEGATIVE MG/DL
RBC # UR STRIP: ABNORMAL /UL
SP GR UR: 1.01 (ref 1–1.03)
UROBILINOGEN UR QL: NORMAL

## 2021-02-22 PROCEDURE — 81002 URINALYSIS NONAUTO W/O SCOPE: CPT | Performed by: FAMILY MEDICINE

## 2021-02-22 PROCEDURE — 99213 OFFICE O/P EST LOW 20 MIN: CPT | Performed by: FAMILY MEDICINE

## 2021-02-22 RX ORDER — SULFAMETHOXAZOLE AND TRIMETHOPRIM 800; 160 MG/1; MG/1
1 TABLET ORAL 2 TIMES DAILY
Qty: 20 TABLET | Refills: 0 | Status: SHIPPED | OUTPATIENT
Start: 2021-02-22 | End: 2021-05-05

## 2021-02-22 NOTE — PROGRESS NOTES
"Chief Complaint  Urinary Frequency    Subjective     CC  Problem List  Visit Diagnosis   Encounters  Notes  Medications  Labs  Result Review Imaging  Media    Adela Dowell presents to [facility] for   Adela had a kidney stone removed 01/07/2021.    Urinary Frequency   This is a new problem. The current episode started 1 to 4 weeks ago. The problem has been unchanged. The quality of the pain is described as burning. The pain is at a severity of 0/10. The patient is experiencing no pain. There has been no fever. Associated symptoms include flank pain, frequency, hematuria and urgency. Pertinent negatives include no chills, discharge, hesitancy, nausea, sweats or vomiting. Treatments tried: AZO. The treatment provided mild relief.       Review of Systems   Constitutional: Negative for chills, fever, unexpected weight gain and unexpected weight loss.   HENT: Negative for congestion and sore throat.    Gastrointestinal: Negative for abdominal pain, nausea and vomiting.   Endocrine: Negative for cold intolerance, heat intolerance, polydipsia and polyuria.   Genitourinary: Positive for flank pain, frequency, hematuria and urgency. Negative for hesitancy.   Musculoskeletal: Negative for arthralgias and myalgias.   Neurological: Negative for weakness and numbness.   Hematological: Negative for adenopathy. Does not bruise/bleed easily.        Objective   Vital Signs:   /80 (BP Location: Left arm, Patient Position: Sitting, Cuff Size: Adult)   Pulse 85   Temp 96.9 °F (36.1 °C) (Temporal)   Resp 18   Ht 171.5 cm (67.5\")   Wt 90.7 kg (200 lb)   SpO2 98% Comment: room air  BMI 30.86 kg/m²     Physical Exam  Vitals signs reviewed.   Constitutional:       General: She is not in acute distress.     Appearance: She is well-developed.   Eyes:      Conjunctiva/sclera: Conjunctivae normal.   Neck:      Musculoskeletal: Neck supple.      Thyroid: No thyromegaly.      Vascular: No " JVD.   Cardiovascular:      Rate and Rhythm: Normal rate and regular rhythm.      Heart sounds: Normal heart sounds. No murmur. No friction rub. No gallop.    Pulmonary:      Effort: Pulmonary effort is normal. No respiratory distress.      Breath sounds: Normal breath sounds. No wheezing or rales.   Abdominal:      General: Bowel sounds are normal. There is no distension.      Palpations: Abdomen is soft. There is no mass.      Tenderness: There is no abdominal tenderness. There is no right CVA tenderness or left CVA tenderness.   Lymphadenopathy:      Cervical: No cervical adenopathy.   Skin:     Findings: No rash.   Neurological:      Mental Status: She is alert and oriented to person, place, and time.      Coordination: Coordination normal.        Result Review :Labs  Result Review  Imaging  Med Tab  Media                 Assessment and Plan CC Problem List  Visit Diagnosis  ROS  Review (Popup)  Health Maintenance  Quality  BestPractice  Medications  SmartSets  SnapShot Encounters  Media  Problem List Items Addressed This Visit        Unprioritized    Obesity    Overview     Diet exercise and wt loss encouraged.            Other Visit Diagnoses     Urinary frequency    -  Primary    Relevant Orders    POCT urinalysis dipstick, manual (Completed)    Urine Culture - Urine, Urine, Clean Catch    Acute cystitis with hematuria        Fluids abx notify her of cx results, she will notify us of no improvement.     Relevant Medications    sulfamethoxazole-trimethoprim (BACTRIM DS,SEPTRA DS) 800-160 MG per tablet          Follow Up Instructions Charge Capture  Follow-up Communications  Return if symptoms worsen or fail to improve.  Patient was given instructions and counseling regarding her condition or for health maintenance advice. Please see specific information pulled into the AVS if appropriate.

## 2021-02-23 PROBLEM — E66.3 OVERWEIGHT: Status: RESOLVED | Noted: 2019-09-23 | Resolved: 2021-02-23

## 2021-02-24 LAB
BACTERIA UR CULT: NORMAL
BACTERIA UR CULT: NORMAL

## 2021-04-25 DIAGNOSIS — F33.1 MODERATE EPISODE OF RECURRENT MAJOR DEPRESSIVE DISORDER (HCC): ICD-10-CM

## 2021-04-25 RX ORDER — ESCITALOPRAM OXALATE 20 MG/1
TABLET ORAL
Qty: 90 TABLET | Refills: 2 | Status: SHIPPED | OUTPATIENT
Start: 2021-04-25 | End: 2021-12-06 | Stop reason: SDUPTHER

## 2021-05-05 ENCOUNTER — OFFICE VISIT (OUTPATIENT)
Dept: FAMILY MEDICINE CLINIC | Facility: CLINIC | Age: 66
End: 2021-05-05

## 2021-05-05 VITALS
SYSTOLIC BLOOD PRESSURE: 140 MMHG | WEIGHT: 200 LBS | OXYGEN SATURATION: 97 % | DIASTOLIC BLOOD PRESSURE: 70 MMHG | HEIGHT: 68 IN | RESPIRATION RATE: 16 BRPM | HEART RATE: 83 BPM | BODY MASS INDEX: 30.31 KG/M2 | TEMPERATURE: 97.3 F

## 2021-05-05 DIAGNOSIS — F33.1 MODERATE EPISODE OF RECURRENT MAJOR DEPRESSIVE DISORDER (HCC): ICD-10-CM

## 2021-05-05 DIAGNOSIS — Z23 NEED FOR SHINGLES VACCINE: ICD-10-CM

## 2021-05-05 DIAGNOSIS — Z00.01 ENCOUNTER FOR ANNUAL GENERAL MEDICAL EXAMINATION WITH ABNORMAL FINDINGS IN ADULT: Primary | ICD-10-CM

## 2021-05-05 DIAGNOSIS — Z12.31 ENCOUNTER FOR SCREENING MAMMOGRAM FOR MALIGNANT NEOPLASM OF BREAST: ICD-10-CM

## 2021-05-05 DIAGNOSIS — E66.09 CLASS 1 OBESITY DUE TO EXCESS CALORIES WITH BODY MASS INDEX (BMI) OF 30.0 TO 30.9 IN ADULT, UNSPECIFIED WHETHER SERIOUS COMORBIDITY PRESENT: ICD-10-CM

## 2021-05-05 LAB
BILIRUB BLD-MCNC: NEGATIVE MG/DL
CLARITY, POC: CLEAR
COLOR UR: YELLOW
GLUCOSE UR STRIP-MCNC: NEGATIVE MG/DL
KETONES UR QL: NEGATIVE
LEUKOCYTE EST, POC: ABNORMAL
NITRITE UR-MCNC: NEGATIVE MG/ML
PH UR: 6.5 [PH] (ref 5–8)
PROT UR STRIP-MCNC: ABNORMAL MG/DL
RBC # UR STRIP: ABNORMAL /UL
SP GR UR: 1.01 (ref 1–1.03)
UROBILINOGEN UR QL: NORMAL

## 2021-05-05 PROCEDURE — 90471 IMMUNIZATION ADMIN: CPT | Performed by: FAMILY MEDICINE

## 2021-05-05 PROCEDURE — 81002 URINALYSIS NONAUTO W/O SCOPE: CPT | Performed by: FAMILY MEDICINE

## 2021-05-05 PROCEDURE — 99396 PREV VISIT EST AGE 40-64: CPT | Performed by: FAMILY MEDICINE

## 2021-05-05 PROCEDURE — 90750 HZV VACC RECOMBINANT IM: CPT | Performed by: FAMILY MEDICINE

## 2021-05-05 RX ORDER — DULOXETIN HYDROCHLORIDE 60 MG/1
60 CAPSULE, DELAYED RELEASE ORAL NIGHTLY
Qty: 90 CAPSULE | Refills: 2 | Status: SHIPPED | OUTPATIENT
Start: 2021-05-05 | End: 2021-12-06 | Stop reason: SDUPTHER

## 2021-05-05 NOTE — PROGRESS NOTES
Chief Complaint   Patient presents with   • Annual Exam     Subjective   Adela Dowell is a 66 y.o. female here for her annual physical with me. Adela is here for coordination of medical care, to discuss health maintenance, disease prevention as well as to followup on medical problems. Patient's last CPE was 2019.  Her pap smear was negative with negative high risk HPV testing.  Activity level is moderate. Exercises 6 per week. Appetite is good. Feels well with no complaints. Energy level is fair. Sleeps fairly well. Patient's last colon cancer screening (colonoscopy) was March 2019. She was advised to repeat in 10 years.  Patient's last mammogram was 09/23/2019. Patient is doing routine self skin exam monthly. Patient is doing routine self-breast exams monthly.    The following portions of the patient's history were reviewed and updated as appropriate: allergies, current medications, past family history, past medical history, past social history, past surgical history and problem list.      Past Medical History :  Active Ambulatory Problems     Diagnosis Date Noted   • Moderate episode of recurrent major depressive disorder (CMS/HCC) 02/14/2019   • Hypertension 02/14/2019   • Disorder of bursae and tendons in shoulder region 09/23/2019   • Chronic headaches 02/14/2019   • Arthritis 02/14/2019   • Anemia 02/14/2019   • Obesity 09/23/2019   • H/O splenectomy 09/23/2019     Resolved Ambulatory Problems     Diagnosis Date Noted   • Depression 09/23/2019   • Overweight 09/23/2019   • Viral upper respiratory tract infection 01/28/2020   • Acute bacterial sinusitis 01/28/2020     Past Medical History:   Diagnosis Date   • Allergic    • Anxiety    • Headache    • Presence of left artificial knee joint        Medication List:    Current Outpatient Medications:   •  aspirin-acetaminophen-caffeine (EXCEDRIN MIGRAINE) 250-250-65 MG per tablet, Take 2 tablets by mouth Every 8 (Eight) Hours As Needed for Headache., Disp: , Rfl:    •  CBD (cannabidiol) oral oil, Take 1 mL by mouth Daily. .75 ML  Patient places it under her tongue., Disp: , Rfl:   •  DULoxetine (CYMBALTA) 60 MG capsule, Take 1 capsule by mouth Every Night., Disp: 90 capsule, Rfl: 2  •  escitalopram (LEXAPRO) 20 MG tablet, TAKE 1 TABLET BY MOUTH EVERY DAY AT NIGHT, Disp: 90 tablet, Rfl: 2  •  Misc Natural Products (OSTEO BI-FLEX ADV TRIPLE ST PO), Take  by mouth Daily., Disp: , Rfl:     Allergies:  Allergies   Allergen Reactions   • Levofloxacin Swelling and Myalgia     Joint swelling       Social History:  Social History     Socioeconomic History   • Marital status:      Spouse name: Not on file   • Number of children: Not on file   • Years of education: Not on file   • Highest education level: Not on file   Tobacco Use   • Smoking status: Never Smoker   • Smokeless tobacco: Never Used   Substance and Sexual Activity   • Alcohol use: No   • Drug use: No   • Sexual activity: Defer       Family History:  Family History   Problem Relation Age of Onset   • Cancer Father        Past Surgical History:  Past Surgical History:   Procedure Laterality Date   • ROTATOR CUFF REPAIR Right 2011    x2   • SINUS SURGERY Bilateral 2000   • SPLENECTOMY     • TUBAL ABDOMINAL LIGATION         Health Maintenance   Topic Date Due   • INFLUENZA VACCINE  08/01/2021   • MAMMOGRAM  09/26/2021   • ANNUAL PHYSICAL  05/06/2022   • PAP SMEAR  09/23/2022   • COLORECTAL CANCER SCREENING  05/01/2029   • TDAP/TD VACCINES (4 - Td) 09/23/2029   • HEPATITIS C SCREENING  Completed   • COVID-19 Vaccine  Completed   • ZOSTER VACCINE  Completed   • Pneumococcal Vaccine 0-64  Aged Out         Review Of Systems:  Review of Systems   Constitutional: Negative for chills and fever.   HENT: Negative for ear pain and sore throat.    Eyes: Negative for double vision and visual disturbance.   Respiratory: Negative for cough and shortness of breath.    Cardiovascular: Negative for chest pain, palpitations and leg  "swelling.   Gastrointestinal: Negative for abdominal pain, blood in stool, constipation, diarrhea, nausea and vomiting.   Endocrine: Negative for polydipsia and polyuria.   Genitourinary: Positive for amenorrhea. Negative for breast discharge, breast lump, breast pain, dysuria, hematuria, pelvic pain, vaginal bleeding and vaginal discharge.   Musculoskeletal: Negative for arthralgias and myalgias.   Skin: Negative for rash.   Neurological: Negative for seizures, syncope, numbness and headache.   Psychiatric/Behavioral: Positive for depressed mood and stress. Negative for behavioral problems. The patient is nervous/anxious.         She reports depressive symptoms worsened this year after the death of 4 family members.  She feels depressive symptoms are improving.         Physical Exam:  Vital Signs:  /70 (BP Location: Right arm, Patient Position: Sitting, Cuff Size: Large Adult)   Pulse 83   Temp 97.3 °F (36.3 °C) (Skin)   Resp 16   Ht 171.5 cm (67.5\")   Wt 90.7 kg (200 lb)   SpO2 97%   BMI 30.86 kg/m²     Physical Exam  Constitutional:       General: She is not in acute distress.     Appearance: Normal appearance. She is well-developed. She is not ill-appearing.   HENT:      Head: Normocephalic and atraumatic.      Right Ear: Tympanic membrane, ear canal and external ear normal.      Left Ear: Tympanic membrane, ear canal and external ear normal.      Nose: Nose normal.      Mouth/Throat:      Mouth: Mucous membranes are moist.      Pharynx: No posterior oropharyngeal erythema.   Eyes:      General: No scleral icterus.        Right eye: No discharge.         Left eye: No discharge.      Extraocular Movements: Extraocular movements intact.      Conjunctiva/sclera: Conjunctivae normal.      Pupils: Pupils are equal, round, and reactive to light.   Neck:      Thyroid: No thyromegaly.      Vascular: No carotid bruit or JVD.   Cardiovascular:      Rate and Rhythm: Normal rate and regular rhythm.      Pulses: " Normal pulses.      Heart sounds: Normal heart sounds. No murmur heard.     Pulmonary:      Effort: Pulmonary effort is normal.      Breath sounds: Normal breath sounds. No wheezing or rales.   Abdominal:      General: There is no distension.      Palpations: Abdomen is soft.      Tenderness: There is no abdominal tenderness. There is no guarding or rebound.   Genitourinary:     Comments: Exam not performed- patient declines breast and pelvic exam today  Musculoskeletal:         General: No tenderness. Normal range of motion.      Cervical back: Normal range of motion and neck supple. No edema.   Lymphadenopathy:      Cervical: No cervical adenopathy.   Skin:     General: Skin is warm.      Capillary Refill: Capillary refill takes less than 2 seconds.      Findings: No erythema, lesion or rash.   Neurological:      General: No focal deficit present.      Mental Status: She is alert and oriented to person, place, and time. Mental status is at baseline.      Cranial Nerves: No cranial nerve deficit.      Motor: No abnormal muscle tone.      Coordination: Coordination normal.   Psychiatric:         Mood and Affect: Mood normal.         Behavior: Behavior normal.         Thought Content: Thought content normal.         Judgment: Judgment normal.       Brief Urine Lab Results  (Last result in the past 365 days)      Color   Clarity   Blood   Leuk Est   Nitrite   Protein   CREAT   Urine HCG        05/05/21 1217 Yellow Clear Large 75 Tai/ul Negative 2+             Patient has known h/o right sided renal stone and is closely follows by urology.  Last imaging reviewed.  Patient has had cystoscopy.      Assessment and Plan:  Diagnoses and all orders for this visit:    1. Encounter for annual general medical examination with abnormal findings in adult (Primary)  -     POCT urinalysis dipstick, manual  -     Lipid Panel w/ Chol/HDL Ratio  -     Comprehensive metabolic panel    2. Encounter for screening mammogram for malignant  neoplasm of breast  -     Mammo Screening Digital Tomosynthesis Bilateral With CAD    3. Moderate episode of recurrent major depressive disorder (CMS/HCC)  Comments:  Symptoms improving.  Continue current medications. Refills given.  Orders:  -     DULoxetine (CYMBALTA) 60 MG capsule; Take 1 capsule by mouth Every Night.  Dispense: 90 capsule; Refill: 2    4. Need for shingles vaccine  -     Shingrix Vaccine    5. Class 1 obesity due to excess calories with body mass index (BMI) of 30.0 to 30.9 in adult, unspecified whether serious comorbidity present      Discussed screening for common diseases.  Discussed timing of cervical cancer screening with pap smear and HPV testing. Encouraged self-breast exams, clinical breast exams, and discussed timing of mammograms.  Recommend yearly eye exams. Also discussed monitoring of blood pressure, lipids, blood sugars.      Mammography ordered.  Colon cancer screening current.    I wore protective equipment throughout this patient encounter to include mask. Hand hygiene was performed before donning protective equipment and after removal when leaving the room.  Patient also wore a mask.

## 2021-05-06 LAB
ALBUMIN SERPL-MCNC: 4.6 G/DL (ref 3.8–4.8)
ALBUMIN/GLOB SERPL: 2.2 {RATIO} (ref 1.2–2.2)
ALP SERPL-CCNC: 199 IU/L (ref 39–117)
ALT SERPL-CCNC: 6 IU/L (ref 0–32)
AST SERPL-CCNC: 21 IU/L (ref 0–40)
BILIRUB SERPL-MCNC: 0.5 MG/DL (ref 0–1.2)
BUN SERPL-MCNC: 12 MG/DL (ref 8–27)
BUN/CREAT SERPL: 16 (ref 12–28)
CALCIUM SERPL-MCNC: 9.6 MG/DL (ref 8.7–10.3)
CHLORIDE SERPL-SCNC: 102 MMOL/L (ref 96–106)
CHOLEST SERPL-MCNC: 194 MG/DL (ref 100–199)
CHOLEST/HDLC SERPL: 3 RATIO (ref 0–4.4)
CO2 SERPL-SCNC: 26 MMOL/L (ref 20–29)
CREAT SERPL-MCNC: 0.76 MG/DL (ref 0.57–1)
GLOBULIN SER CALC-MCNC: 2.1 G/DL (ref 1.5–4.5)
GLUCOSE SERPL-MCNC: 88 MG/DL (ref 65–99)
HDLC SERPL-MCNC: 65 MG/DL
LDLC SERPL CALC-MCNC: 115 MG/DL (ref 0–99)
POTASSIUM SERPL-SCNC: 5.2 MMOL/L (ref 3.5–5.2)
PROT SERPL-MCNC: 6.7 G/DL (ref 6–8.5)
SODIUM SERPL-SCNC: 141 MMOL/L (ref 134–144)
TRIGL SERPL-MCNC: 76 MG/DL (ref 0–149)
VLDLC SERPL CALC-MCNC: 14 MG/DL (ref 5–40)

## 2021-05-12 ENCOUNTER — TELEPHONE (OUTPATIENT)
Dept: FAMILY MEDICINE CLINIC | Facility: CLINIC | Age: 66
End: 2021-05-12

## 2021-05-12 DIAGNOSIS — R74.8 ELEVATED ALKALINE PHOSPHATASE LEVEL: ICD-10-CM

## 2021-05-12 DIAGNOSIS — Z13.820 SCREENING FOR OSTEOPOROSIS: Primary | ICD-10-CM

## 2021-05-12 NOTE — TELEPHONE ENCOUNTER
----- Message from Olga Sin MA sent at 5/12/2021  7:52 AM EDT -----  Regarding: FW: Test Results Question  Contact: [phone]    ----- Message -----  From: Adela Dowell  Sent: 5/11/2021  12:16 PM EDT  To: Elif Tran  Clinical Pool  Subject: Test Results Question                            Hi Dr. Torres,  Go ahead and schedule the further vitamin D and bone tests.  Thanks, Adela

## 2021-05-17 ENCOUNTER — HOSPITAL ENCOUNTER (OUTPATIENT)
Dept: BONE DENSITY | Facility: HOSPITAL | Age: 66
Discharge: HOME | End: 2021-05-17
Admitting: FAMILY MEDICINE

## 2021-05-17 DIAGNOSIS — Z13.820 SCREENING FOR OSTEOPOROSIS: ICD-10-CM

## 2021-05-17 PROCEDURE — 77080 DXA BONE DENSITY AXIAL: CPT

## 2021-05-26 ENCOUNTER — APPOINTMENT (OUTPATIENT)
Dept: OTHER | Facility: HOSPITAL | Age: 66
End: 2021-05-26

## 2021-05-26 ENCOUNTER — HOSPITAL ENCOUNTER (OUTPATIENT)
Dept: MAMMOGRAPHY | Facility: HOSPITAL | Age: 66
Discharge: HOME | End: 2021-05-26
Admitting: FAMILY MEDICINE

## 2021-05-26 DIAGNOSIS — Z09 FOLLOW UP: ICD-10-CM

## 2021-05-26 PROCEDURE — 77067 SCR MAMMO BI INCL CAD: CPT

## 2021-05-26 PROCEDURE — 77063 BREAST TOMOSYNTHESIS BI: CPT

## 2021-06-05 PROBLEM — M85.88 OSTEOPENIA OF LUMBAR SPINE: Status: ACTIVE | Noted: 2021-06-05

## 2021-06-05 PROBLEM — M85.89 OSTEOPENIA OF MULTIPLE SITES: Status: ACTIVE | Noted: 2021-06-05

## 2021-06-22 ENCOUNTER — HOSPITAL ENCOUNTER (OUTPATIENT)
Dept: ULTRASOUND IMAGING | Facility: HOSPITAL | Age: 66
Discharge: HOME | End: 2021-06-22

## 2021-06-22 ENCOUNTER — HOSPITAL ENCOUNTER (OUTPATIENT)
Dept: MAMMOGRAPHY | Facility: HOSPITAL | Age: 66
Discharge: HOME | End: 2021-06-22

## 2021-06-22 DIAGNOSIS — N64.89 BREAST ASYMMETRY: ICD-10-CM

## 2021-06-22 PROCEDURE — 77066 DX MAMMO INCL CAD BI: CPT

## 2021-06-22 PROCEDURE — G0279 TOMOSYNTHESIS, MAMMO: HCPCS

## 2021-06-22 PROCEDURE — 76642 ULTRASOUND BREAST LIMITED: CPT

## 2021-09-22 ENCOUNTER — TELEPHONE (OUTPATIENT)
Dept: FAMILY MEDICINE CLINIC | Facility: CLINIC | Age: 66
End: 2021-09-22

## 2021-09-22 DIAGNOSIS — E55.9 VITAMIN D DEFICIENCY: ICD-10-CM

## 2021-09-22 DIAGNOSIS — D64.9 ANEMIA, UNSPECIFIED TYPE: ICD-10-CM

## 2021-09-22 DIAGNOSIS — R74.8 ELEVATED ALKALINE PHOSPHATASE LEVEL: Primary | ICD-10-CM

## 2021-09-22 DIAGNOSIS — Z13.220 SCREENING FOR HYPERLIPIDEMIA: ICD-10-CM

## 2021-09-22 NOTE — TELEPHONE ENCOUNTER
Caller: Adela Dowell    Relationship: Self    Best call back number: [phone] (H)    What orders are you requesting (i.e. lab or imaging):LABS    In what timeframe would the patient need to come in: BEFORE APPT ON 9/29    Where will you receive your lab/imaging services: IN OFFICE    Additional notes:PATIENT WOULD LIKE TO HAVE HER VITAMIN D LEVELS CHECKED BEFORE HER APPT ON 9/29/21. STATES LAST TIME SHE HAD THEM CHECKED THEY WERE LOW. PLEASE CALL PATIENT WHEN ORDERS ARE PUT IN. THANK YOU.

## 2021-09-24 LAB
25(OH)D3+25(OH)D2 SERPL-MCNC: 78.6 NG/ML (ref 30–100)
ALBUMIN SERPL-MCNC: 4.6 G/DL (ref 3.8–4.8)
ALBUMIN/GLOB SERPL: 1.8 {RATIO} (ref 1.2–2.2)
ALP SERPL-CCNC: 175 IU/L (ref 44–121)
ALT SERPL-CCNC: 12 IU/L (ref 0–32)
AST SERPL-CCNC: 23 IU/L (ref 0–40)
BASOPHILS # BLD AUTO: 0.1 X10E3/UL (ref 0–0.2)
BASOPHILS NFR BLD AUTO: 1 %
BILIRUB SERPL-MCNC: 0.3 MG/DL (ref 0–1.2)
BUN SERPL-MCNC: 17 MG/DL (ref 8–27)
BUN/CREAT SERPL: 18 (ref 12–28)
CALCIUM SERPL-MCNC: 9.6 MG/DL (ref 8.7–10.3)
CHLORIDE SERPL-SCNC: 101 MMOL/L (ref 96–106)
CHOLEST SERPL-MCNC: 198 MG/DL (ref 100–199)
CHOLEST/HDLC SERPL: 3 RATIO (ref 0–4.4)
CO2 SERPL-SCNC: 25 MMOL/L (ref 20–29)
CREAT SERPL-MCNC: 0.97 MG/DL (ref 0.57–1)
EOSINOPHIL # BLD AUTO: 0.5 X10E3/UL (ref 0–0.4)
EOSINOPHIL NFR BLD AUTO: 5 %
ERYTHROCYTE [DISTWIDTH] IN BLOOD BY AUTOMATED COUNT: 12.9 % (ref 11.7–15.4)
GLOBULIN SER CALC-MCNC: 2.6 G/DL (ref 1.5–4.5)
GLUCOSE SERPL-MCNC: 83 MG/DL (ref 65–99)
HCT VFR BLD AUTO: 44.3 % (ref 34–46.6)
HDLC SERPL-MCNC: 66 MG/DL
HGB BLD-MCNC: 14.3 G/DL (ref 11.1–15.9)
IMM GRANULOCYTES # BLD AUTO: 0 X10E3/UL (ref 0–0.1)
IMM GRANULOCYTES NFR BLD AUTO: 0 %
LDLC SERPL CALC-MCNC: 118 MG/DL (ref 0–99)
LYMPHOCYTES # BLD AUTO: 3.6 X10E3/UL (ref 0.7–3.1)
LYMPHOCYTES NFR BLD AUTO: 36 %
MCH RBC QN AUTO: 28.1 PG (ref 26.6–33)
MCHC RBC AUTO-ENTMCNC: 32.3 G/DL (ref 31.5–35.7)
MCV RBC AUTO: 87 FL (ref 79–97)
MONOCYTES # BLD AUTO: 0.6 X10E3/UL (ref 0.1–0.9)
MONOCYTES NFR BLD AUTO: 6 %
NEUTROPHILS # BLD AUTO: 5.1 X10E3/UL (ref 1.4–7)
NEUTROPHILS NFR BLD AUTO: 52 %
PLATELET # BLD AUTO: 445 X10E3/UL (ref 150–450)
POTASSIUM SERPL-SCNC: 4.7 MMOL/L (ref 3.5–5.2)
PROT SERPL-MCNC: 7.2 G/DL (ref 6–8.5)
RBC # BLD AUTO: 5.08 X10E6/UL (ref 3.77–5.28)
SODIUM SERPL-SCNC: 141 MMOL/L (ref 134–144)
TRIGL SERPL-MCNC: 77 MG/DL (ref 0–149)
VIT B12 SERPL-MCNC: 410 PG/ML (ref 232–1245)
VLDLC SERPL CALC-MCNC: 14 MG/DL (ref 5–40)
WBC # BLD AUTO: 9.9 X10E3/UL (ref 3.4–10.8)

## 2021-09-29 ENCOUNTER — OFFICE VISIT (OUTPATIENT)
Dept: FAMILY MEDICINE CLINIC | Facility: CLINIC | Age: 66
End: 2021-09-29

## 2021-09-29 VITALS
DIASTOLIC BLOOD PRESSURE: 95 MMHG | SYSTOLIC BLOOD PRESSURE: 145 MMHG | HEART RATE: 98 BPM | TEMPERATURE: 97.8 F | OXYGEN SATURATION: 98 % | WEIGHT: 204.2 LBS | BODY MASS INDEX: 32.05 KG/M2 | HEIGHT: 67 IN

## 2021-09-29 DIAGNOSIS — E66.09 CLASS 1 OBESITY DUE TO EXCESS CALORIES WITH SERIOUS COMORBIDITY AND BODY MASS INDEX (BMI) OF 31.0 TO 31.9 IN ADULT: ICD-10-CM

## 2021-09-29 DIAGNOSIS — F41.9 ANXIETY DISORDER, UNSPECIFIED TYPE: ICD-10-CM

## 2021-09-29 DIAGNOSIS — F33.2 SEVERE EPISODE OF RECURRENT MAJOR DEPRESSIVE DISORDER, WITHOUT PSYCHOTIC FEATURES (HCC): Primary | ICD-10-CM

## 2021-09-29 DIAGNOSIS — F51.04 CHRONIC INSOMNIA: ICD-10-CM

## 2021-09-29 PROCEDURE — 99214 OFFICE O/P EST MOD 30 MIN: CPT | Performed by: FAMILY MEDICINE

## 2021-10-06 ENCOUNTER — TELEPHONE (OUTPATIENT)
Dept: FAMILY MEDICINE CLINIC | Facility: CLINIC | Age: 66
End: 2021-10-06

## 2021-10-06 NOTE — TELEPHONE ENCOUNTER
Caller: Adela Dowell    Relationship: Self    Best call back number:[phone]  What medication are you requesting: SOMETHING TO HELP WITH SLEEP     What are your current symptoms:RESTLESSNESS AND INSOMNIA     How long have you been experiencing symptoms:1 YEAR     Have you had these symptoms before:    [] Yes  [x] No    Have you been treated for these symptoms before:   [] Yes  [x] No    If a prescription is needed, what is your preferred pharmacy and phone number: [id number] - [place], [place] - [address] - [phone] [facility] [phone]      Additional notes:WAS IN OFFICE LAST WEEK AND THIS WAS DISCUSSED DURING THE VISIT

## 2021-10-09 DIAGNOSIS — F51.02 ADJUSTMENT INSOMNIA: Primary | ICD-10-CM

## 2021-10-09 RX ORDER — ZOLPIDEM TARTRATE 5 MG/1
5 TABLET ORAL NIGHTLY PRN
Qty: 14 TABLET | Refills: 0 | Status: SHIPPED | OUTPATIENT
Start: 2021-10-09 | End: 2022-09-15

## 2021-11-22 ENCOUNTER — TELEPHONE (OUTPATIENT)
Dept: FAMILY MEDICINE CLINIC | Facility: CLINIC | Age: 66
End: 2021-11-22

## 2021-11-22 NOTE — TELEPHONE ENCOUNTER
----- Message from Adela Dowell sent at 11/22/2021 11:38 AM EST -----  Regarding: [facility] visit  Hi Dr. Torres!  I went to my appointment  at [facility] and they ran some tests that have been linked to you.  Dr. Desir  wanted me to make an appointment with you to follow up and is going to change my medicatio based on the tests.  Could you please let me know when I  could come in?  Thank you!

## 2021-12-06 ENCOUNTER — OFFICE VISIT (OUTPATIENT)
Dept: FAMILY MEDICINE CLINIC | Facility: CLINIC | Age: 66
End: 2021-12-06

## 2021-12-06 VITALS
DIASTOLIC BLOOD PRESSURE: 90 MMHG | RESPIRATION RATE: 18 BRPM | BODY MASS INDEX: 32.27 KG/M2 | WEIGHT: 205.6 LBS | HEIGHT: 67 IN | OXYGEN SATURATION: 98 % | SYSTOLIC BLOOD PRESSURE: 140 MMHG | TEMPERATURE: 97.5 F | HEART RATE: 99 BPM

## 2021-12-06 DIAGNOSIS — I10 PRIMARY HYPERTENSION: ICD-10-CM

## 2021-12-06 DIAGNOSIS — R51.9 CHRONIC NONINTRACTABLE HEADACHE, UNSPECIFIED HEADACHE TYPE: ICD-10-CM

## 2021-12-06 DIAGNOSIS — G89.29 CHRONIC NONINTRACTABLE HEADACHE, UNSPECIFIED HEADACHE TYPE: ICD-10-CM

## 2021-12-06 DIAGNOSIS — F33.2 SEVERE EPISODE OF RECURRENT MAJOR DEPRESSIVE DISORDER, WITHOUT PSYCHOTIC FEATURES (HCC): Primary | ICD-10-CM

## 2021-12-06 DIAGNOSIS — R92.8 ABNORMAL MAMMOGRAM OF RIGHT BREAST: ICD-10-CM

## 2021-12-06 DIAGNOSIS — E66.09 CLASS 1 OBESITY DUE TO EXCESS CALORIES WITH SERIOUS COMORBIDITY AND BODY MASS INDEX (BMI) OF 31.0 TO 31.9 IN ADULT: ICD-10-CM

## 2021-12-06 PROBLEM — F32.A DEPRESSIVE DISORDER: Status: ACTIVE | Noted: 2021-12-06

## 2021-12-06 PROCEDURE — 99215 OFFICE O/P EST HI 40 MIN: CPT | Performed by: FAMILY MEDICINE

## 2021-12-06 RX ORDER — ESCITALOPRAM OXALATE 20 MG/1
10 TABLET ORAL
Qty: 90 TABLET | Refills: 0
Start: 2021-12-06 | End: 2022-01-03

## 2021-12-06 RX ORDER — RIZATRIPTAN BENZOATE 10 MG/1
TABLET, ORALLY DISINTEGRATING ORAL
Qty: 12 TABLET | Refills: 5 | Status: SHIPPED | OUTPATIENT
Start: 2021-12-06 | End: 2023-03-02

## 2021-12-06 RX ORDER — LISINOPRIL 10 MG/1
10 TABLET ORAL DAILY
Qty: 90 TABLET | Refills: 1 | Status: SHIPPED | OUTPATIENT
Start: 2021-12-06 | End: 2022-01-03 | Stop reason: SDUPTHER

## 2021-12-06 RX ORDER — DULOXETIN HYDROCHLORIDE 30 MG/1
30 CAPSULE, DELAYED RELEASE ORAL DAILY
Qty: 90 CAPSULE | Refills: 2 | Status: SHIPPED | OUTPATIENT
Start: 2021-12-06 | End: 2022-01-03

## 2021-12-06 RX ORDER — DULOXETIN HYDROCHLORIDE 60 MG/1
60 CAPSULE, DELAYED RELEASE ORAL DAILY
Qty: 90 CAPSULE | Refills: 2 | Status: SHIPPED | OUTPATIENT
Start: 2021-12-06 | End: 2022-01-03 | Stop reason: SDUPTHER

## 2021-12-06 NOTE — PROGRESS NOTES
Chief Complaint   Patient presents with   • Depression   • Anxiety       Subjective   Adela Dowell is a 66 y.o. female.      She is here today to discuss her visit at the [facility] for depression.  [employer] testing was done.  Records not available for review for today's visit but patient is able to pull the report from her [employer] account.    Recommendations from [facility] doctor include:   1) titration of duloxetine to 90 mg then 120 mg.   2) reduction/wean of escitalopram from 20 mg to 10 mg then off.  3) possible augmentation with atypical antipsychotic.  4) possible use of TCA for headache and depression.  5) consultation with neurology ([facility] will put in referral)  6) MRI brain  7) thyroid labs - done and normal.    Depression  Visit Type: follow-up  Patient presents with the following symptoms: decreased concentration, depressed mood, dry mouth, excessive worry, fatigue, feelings of hopelessness, feelings of worthlessness, insomnia, irritability, muscle tension, nervousness/anxiety, panic, restlessness and thoughts of death.  Patient is not experiencing: chest pain, palpitations, shortness of breath, suicidal ideas and suicidal planning.  Frequency of symptoms: most days   Severity: interfering with daily activities     Anxiety  Presents for follow-up visit. Symptoms include decreased concentration, depressed mood, dry mouth, excessive worry, insomnia, irritability, muscle tension, nervous/anxious behavior, panic and restlessness. Patient reports no chest pain, dizziness, palpitations, shortness of breath or suicidal ideas. Symptoms occur most days.     Her past medical history is significant for depression.   Headache   This is a chronic problem. Episode onset: age 12. The problem occurs intermittently (several times weekly). The problem has been unchanged. The pain is located in the bilateral (starts in shoulders) region. Radiates to: band around head. The quality of the pain is described as  band-like and aching. Associated symptoms include insomnia and a visual change (with the migraines (wavy)). Pertinent negatives include no dizziness, numbness, seizures, vomiting or weakness. The symptoms are aggravated by emotional stress (tension). She has tried Excedrin (massage therapy) for the symptoms. The treatment provided mild relief. Her past medical history is significant for migraine headaches (she gets a migraine about once per month (triggered by stress) - had used a triptan in the past).      Symptoms have been present for years (since age 11-12).  Prior treatments have included counseling/therapy, multiple medications (poor tolerance to some due to side effects).  Fluoxetine seemed to work the best in the past.  She stopped taking medications in 1989 due to pregnancy and then restarted on fluoxetine after she was done breastfeeding.  Subsequent treatment has included dosage adjustments and augmentation with Wellbutrin.  Medications work for a while then fail to provide relief.     She established with Dr Dillard (psychiatry) in 2019.  Working dx: depression, PTSD and anxiety (records not available for review).  Current treatment includes Cymbalta, Lexapro CBD,Vitamin D, Omega-3 fatty acids, and counseling.      Answers for HPI/ROS submitted by the patient on 12/5/2021  Please describe your symptoms.: Follow up on visit to [place] about depression.  Have you had these symptoms before?: Yes  How long have you been having these symptoms?: Greater than 2 weeks  Please list any medications you are currently taking for this condition.: Duloxetine HCL  (Cymbalta) 60 - one per day, Escitalopram (Lexapro) 20 -  one per day, Vitamin D3 5000 - two per day, Omega 3 Fish Oil 1000 - two per day, Glucosamine Chondroitin - one per day  What is the primary reason for your visit?: Other      Past Medical History :  Active Ambulatory Problems     Diagnosis Date Noted   • Moderate episode of recurrent major depressive  disorder (HCC) 02/14/2019   • Hypertension 02/14/2019   • Disorder of bursae and tendons in shoulder region 09/23/2019   • Chronic headaches 02/14/2019   • Arthritis 02/14/2019   • Anemia 02/14/2019   • Obesity 09/23/2019   • H/O splenectomy 09/23/2019   • Osteopenia of lumbar spine 06/05/2021   • Depressive disorder 12/06/2021     Resolved Ambulatory Problems     Diagnosis Date Noted   • Depression 09/23/2019   • Overweight 09/23/2019   • Viral upper respiratory tract infection 01/28/2020   • Acute bacterial sinusitis 01/28/2020     Past Medical History:   Diagnosis Date   • Allergic    • Anxiety    • Headache    • Presence of left artificial knee joint        Medication List:    Current Outpatient Medications:   •  aspirin-acetaminophen-caffeine (EXCEDRIN MIGRAINE) 250-250-65 MG per tablet, Take 2 tablets by mouth Every 8 (Eight) Hours As Needed for Headache., Disp: , Rfl:   •  CBD (cannabidiol) oral oil, Take 1 mL by mouth Daily. .75 ML  Patient places it under her tongue., Disp: , Rfl:   •  DULoxetine (CYMBALTA) 60 MG capsule, Take 1 capsule by mouth Every Night., Disp: 90 capsule, Rfl: 2  •  escitalopram (LEXAPRO) 20 MG tablet, TAKE 1 TABLET BY MOUTH EVERY DAY AT NIGHT, Disp: 90 tablet, Rfl: 2  •  Misc Natural Products (OSTEO BI-FLEX ADV TRIPLE ST PO), Take  by mouth Daily., Disp: , Rfl:   •  vitamin D3 (vitamin d) 125 MCG (5000 UT) capsule capsule, Take 125 mcg by mouth., Disp: , Rfl:   •  zolpidem (AMBIEN) 5 MG tablet, Take 1 tablet by mouth At Night As Needed for Sleep., Disp: 14 tablet, Rfl: 0    Allergies   Allergen Reactions   • Levofloxacin Swelling and Myalgia     Joint swelling       Social History     Tobacco Use   • Smoking status: Never Smoker   • Smokeless tobacco: Never Used   Substance Use Topics   • Alcohol use: No       Review of Systems   Constitutional: Positive for fatigue and irritability.   Eyes: Positive for visual disturbance (with migraines only). Negative for double vision and  "discharge.   Respiratory: Negative for shortness of breath.    Cardiovascular: Negative for chest pain and palpitations.   Gastrointestinal: Negative for diarrhea and vomiting.   Endocrine: Negative for polydipsia and polyuria.   Genitourinary:        She has been notified that it is time for another mammogram.   Neurological: Positive for headache (migraine x 3 days today). Negative for dizziness, seizures, syncope, facial asymmetry, speech difficulty, weakness, light-headedness and numbness.   Psychiatric/Behavioral: Positive for decreased concentration and depressed mood. Negative for suicidal ideas. The patient is nervous/anxious and has insomnia.          Objective   Vitals:    12/06/21 0821   BP: 140/90   BP Location: Right arm   Patient Position: Sitting   Cuff Size: Adult   Pulse: 99   Resp: 18   Temp: 97.5 °F (36.4 °C)   SpO2: 98%   Weight: 93.3 kg (205 lb 9.6 oz)   Height: 170.2 cm (67\")     Body mass index is 32.2 kg/m².    Physical Exam  Constitutional:       General: She is not in acute distress.     Appearance: Normal appearance. She is well-developed.   HENT:      Head: Normocephalic and atraumatic.   Eyes:      General: No scleral icterus.        Right eye: No discharge.         Left eye: No discharge.      Extraocular Movements: Extraocular movements intact.      Conjunctiva/sclera: Conjunctivae normal.   Cardiovascular:      Rate and Rhythm: Normal rate and regular rhythm.      Heart sounds: Murmur heard.       Pulmonary:      Effort: Pulmonary effort is normal.      Breath sounds: Normal breath sounds.   Musculoskeletal:         General: No swelling.      Cervical back: Normal range of motion and neck supple.      Right lower leg: No edema.      Left lower leg: No edema.   Skin:     General: Skin is warm.      Capillary Refill: Capillary refill takes less than 2 seconds.      Findings: No rash.   Neurological:      General: No focal deficit present.      Mental Status: She is alert.      Cranial " Nerves: No cranial nerve deficit.            Lab Results   Component Value Date    BUN 17 09/23/2021    CREATININE 0.97 09/23/2021    EGFRIFNONA 62 09/23/2021    EGFRIFAFRI 71 09/23/2021     09/23/2021    K 4.7 09/23/2021     09/23/2021    CALCIUM 9.6 09/23/2021    ALBUMIN 4.6 09/23/2021    BILITOT 0.3 09/23/2021    ALKPHOS 175 (H) 09/23/2021    AST 23 09/23/2021    ALT 12 09/23/2021    CHLPL 198 09/23/2021    TRIG 77 09/23/2021    HDL 66 09/23/2021    VLDL 14 09/23/2021     (H) 09/23/2021    WBC 9.9 09/23/2021    RBC 5.08 09/23/2021    HCT 44.3 09/23/2021    MCV 87 09/23/2021    MCH 28.1 09/23/2021    FREET4 1 11/15/2021    MDSZ90KR 78.6 09/23/2021          Assessment/Plan     Diagnoses and all orders for this visit:    1. Severe episode of recurrent major depressive disorder, without psychotic features (HCC) (Primary)  -     DULoxetine (CYMBALTA) 60 MG capsule; Take 1 capsule by mouth Daily. Take with 30 mg daily for a total daily dosage of 90 mg.  Dispense: 90 capsule; Refill: 2  -     escitalopram (LEXAPRO) 20 MG tablet; Take 0.5 tablets by mouth every night at bedtime. Cut 20 mg in half.  Dispense: 90 tablet; Refill: 0  -     DULoxetine (CYMBALTA) 30 MG capsule; Take 1 capsule by mouth Daily. Take with 60 mg daily for a total daily dosage of 90 mg.  Dispense: 90 capsule; Refill: 2    2. Chronic nonintractable headache, unspecified headache type  -     rizatriptan MLT (MAXALT-MLT) 10 MG disintegrating tablet; Take one at onset of a headache.  Repeat in two hours if needed.  Dispense: 12 tablet; Refill: 5  -     MRI Brain With & Without Contrast; Future    3. Primary hypertension  -     lisinopril (PRINIVIL,ZESTRIL) 10 MG tablet; Take 1 tablet by mouth Daily.  Dispense: 90 tablet; Refill: 1    4. Abnormal mammogram of right breast  -     Mammo Diagnostic Digital Tomosynthesis Right With CAD  -     US Breast Right Limited    5. Class 1 obesity due to excess calories with serious comorbidity and  body mass index (BMI) of 31.0 to 31.9 in adult  Comments:  She will work on lifestyle changes.    Will titrate and taper antidepressants as recommended by [facility].  Trial of Maxalt MLT for acute migraine (1/month).  MRI for chronic tension headaches (2-3/week).  Add lisinopril for HTN.  Watch for worsening BP with increase in SRNI.  Repeat diagnostic breast imaging.  F/U 1 mo.    Medication and medication adverse effects discussed.  Drug education given and explained to patient. Patient verbalized understanding.  All questions presented by patient addressed.    Return in about 4 weeks (around 1/3/2022) for Recheck.       Patient was given instructions and counseling regarding his/her condition or for health maintenance advice. Please see specific information pulled into the AVS if appropriate.     I wore protective equipment throughout this patient encounter to include mask. Hand hygiene was performed before donning protective equipment and after removal when leaving the room.     I spent 43 minutes caring for Adela on this date of service. This time includes time spent by me in the following activities:preparing for the visit, reviewing tests, obtaining and/or reviewing a separately obtained history, performing a medically appropriate examination and/or evaluation , counseling and educating the patient/family/caregiver, ordering medications, tests, or procedures and documenting information in the medical record.

## 2021-12-15 NOTE — PROGRESS NOTES
Please let patient know that her MRI looks good.  She does have some cysts in her sinuses, so if she develops issues related to her sinuses, she f/u in the office to discuss.

## 2022-01-03 ENCOUNTER — HOSPITAL ENCOUNTER (OUTPATIENT)
Dept: ULTRASOUND IMAGING | Facility: HOSPITAL | Age: 67
Discharge: HOME | End: 2022-01-03

## 2022-01-03 ENCOUNTER — HOSPITAL ENCOUNTER (OUTPATIENT)
Dept: MAMMOGRAPHY | Facility: HOSPITAL | Age: 67
Discharge: HOME | End: 2022-01-03

## 2022-01-03 ENCOUNTER — OFFICE VISIT (OUTPATIENT)
Dept: FAMILY MEDICINE CLINIC | Facility: CLINIC | Age: 67
End: 2022-01-03

## 2022-01-03 VITALS
OXYGEN SATURATION: 99 % | SYSTOLIC BLOOD PRESSURE: 140 MMHG | RESPIRATION RATE: 18 BRPM | DIASTOLIC BLOOD PRESSURE: 82 MMHG | HEIGHT: 68 IN | BODY MASS INDEX: 30.82 KG/M2 | HEART RATE: 83 BPM | WEIGHT: 203.38 LBS | TEMPERATURE: 97.1 F

## 2022-01-03 DIAGNOSIS — I10 PRIMARY HYPERTENSION: ICD-10-CM

## 2022-01-03 DIAGNOSIS — F33.2 SEVERE EPISODE OF RECURRENT MAJOR DEPRESSIVE DISORDER, WITHOUT PSYCHOTIC FEATURES: Primary | ICD-10-CM

## 2022-01-03 DIAGNOSIS — E66.09 CLASS 1 OBESITY DUE TO EXCESS CALORIES WITH SERIOUS COMORBIDITY AND BODY MASS INDEX (BMI) OF 31.0 TO 31.9 IN ADULT: ICD-10-CM

## 2022-01-03 DIAGNOSIS — S90.121A CONTUSION OF LESSER TOE OF RIGHT FOOT WITHOUT DAMAGE TO NAIL, INITIAL ENCOUNTER: ICD-10-CM

## 2022-01-03 DIAGNOSIS — G89.29 CHRONIC NONINTRACTABLE HEADACHE, UNSPECIFIED HEADACHE TYPE: ICD-10-CM

## 2022-01-03 DIAGNOSIS — R51.9 CHRONIC NONINTRACTABLE HEADACHE, UNSPECIFIED HEADACHE TYPE: ICD-10-CM

## 2022-01-03 PROCEDURE — 99214 OFFICE O/P EST MOD 30 MIN: CPT | Performed by: FAMILY MEDICINE

## 2022-01-03 PROCEDURE — 77065 DX MAMMO INCL CAD UNI: CPT

## 2022-01-03 PROCEDURE — G0279 TOMOSYNTHESIS, MAMMO: HCPCS

## 2022-01-03 RX ORDER — DULOXETIN HYDROCHLORIDE 60 MG/1
120 CAPSULE, DELAYED RELEASE ORAL DAILY
Qty: 180 CAPSULE | Refills: 2 | Status: SHIPPED | OUTPATIENT
Start: 2022-01-03 | End: 2023-01-11

## 2022-01-03 RX ORDER — LISINOPRIL 20 MG/1
20 TABLET ORAL DAILY
Qty: 90 TABLET | Refills: 2 | Status: SHIPPED | OUTPATIENT
Start: 2022-01-03 | End: 2022-05-09 | Stop reason: SDUPTHER

## 2022-01-03 NOTE — PROGRESS NOTES
Chief Complaint   Patient presents with   • Depression   • Hypertension   • Anxiety       Subjective   Adela Dowell is a 66 y.o. female.  She is here for recheck from her Dec 9th visit.  Since that visit, she has weaned off escitalopram and is currently on duloxetine 90 mg.  She is better but thinks symptoms could be better controlled.      Headache/migraine improved with Maxalt.  She had to take 2 doses, but headache resolved.  Daily headaches are improved.  No additional migraines.    Depression  Visit Type: follow-up  Patient presents with the following symptoms: decreased concentration, depressed mood, dry mouth, excessive worry, fatigue, feelings of hopelessness, feelings of worthlessness, insomnia, irritability, muscle tension, nervousness/anxiety, panic and restlessness.  Patient is not experiencing: thoughts of death.  Frequency of symptoms: most days   Severity: severe   Sleep per night: 6 hours  Sleep quality: poor  Nighttime awakenings: several  Compliance with medications:  %        Anxiety  Presents for follow-up visit. Symptoms include decreased concentration, depressed mood, dry mouth, excessive worry, insomnia, irritability, muscle tension, nervous/anxious behavior, panic and restlessness. Symptoms occur most days. The severity of symptoms is severe. The quality of sleep is poor. Nighttime awakenings: several.     Her past medical history is significant for depression. Compliance with medications is %.   Hypertension  This is a recurrent problem. The current episode started 1 to 4 weeks ago. The problem has been gradually improving since onset. Associated symptoms include anxiety.        Answers for HPI/ROS submitted by the patient on 1/3/2022  Please describe your symptoms.: Follow up for meds.  Have you had these symptoms before?: Yes  How long have you been having these symptoms?: Greater than 2 weeks  What is the primary reason for your visit?: Other        Past Medical History  :  Active Ambulatory Problems     Diagnosis Date Noted   • Moderate episode of recurrent major depressive disorder (HCC) 02/14/2019   • Hypertension 02/14/2019   • Disorder of bursae and tendons in shoulder region 09/23/2019   • Chronic headaches 02/14/2019   • Arthritis 02/14/2019   • Anemia 02/14/2019   • Obesity 09/23/2019   • H/O splenectomy 09/23/2019   • Osteopenia of lumbar spine 06/05/2021   • Depressive disorder 12/06/2021     Resolved Ambulatory Problems     Diagnosis Date Noted   • Depression 09/23/2019   • Overweight 09/23/2019   • Viral upper respiratory tract infection 01/28/2020   • Acute bacterial sinusitis 01/28/2020     Past Medical History:   Diagnosis Date   • Allergic    • Anxiety    • Headache    • Presence of left artificial knee joint        Medication List:    Current Outpatient Medications:   •  aspirin-acetaminophen-caffeine (EXCEDRIN MIGRAINE) 250-250-65 MG per tablet, Take 2 tablets by mouth Every 8 (Eight) Hours As Needed for Headache., Disp: , Rfl:   •  CBD (cannabidiol) oral oil, Take 1 mL by mouth Daily. .75 ML  Patient places it under her tongue., Disp: , Rfl:   •  DULoxetine (CYMBALTA) 30 MG capsule, Take 1 capsule by mouth Daily. Take with 60 mg daily for a total daily dosage of 90 mg., Disp: 90 capsule, Rfl: 2  •  DULoxetine (CYMBALTA) 60 MG capsule, Take 1 capsule by mouth Daily. Take with 30 mg daily for a total daily dosage of 90 mg., Disp: 90 capsule, Rfl: 2  •  escitalopram (LEXAPRO) 20 MG tablet, Take 0.5 tablets by mouth every night at bedtime. Cut 20 mg in half., Disp: 90 tablet, Rfl: 0  •  lisinopril (PRINIVIL,ZESTRIL) 10 MG tablet, Take 1 tablet by mouth Daily., Disp: 90 tablet, Rfl: 1  •  Misc Natural Products (OSTEO BI-FLEX ADV TRIPLE ST PO), Take  by mouth Daily., Disp: , Rfl:   •  rizatriptan MLT (MAXALT-MLT) 10 MG disintegrating tablet, Take one at onset of a headache.  Repeat in two hours if needed., Disp: 12 tablet, Rfl: 5  •  vitamin D3 (vitamin d) 125 MCG (5000  "UT) capsule capsule, Take 125 mcg by mouth., Disp: , Rfl:   •  zolpidem (AMBIEN) 5 MG tablet, Take 1 tablet by mouth At Night As Needed for Sleep., Disp: 14 tablet, Rfl: 0    Allergies   Allergen Reactions   • Levofloxacin Swelling and Myalgia     Joint swelling       Social History     Tobacco Use   • Smoking status: Never Smoker   • Smokeless tobacco: Never Used   Substance Use Topics   • Alcohol use: No       Review of Systems   Constitutional: Positive for irritability.   Skin: Positive for bruise (she hit her right 2nd toe last night.  it is painful, swollen and bruised today.).   Psychiatric/Behavioral: Positive for decreased concentration and depressed mood. The patient is nervous/anxious and has insomnia.          Objective   Vitals:    01/03/22 1045   BP: 140/82   BP Location: Right arm   Patient Position: Sitting   Cuff Size: Adult   Pulse: 83   Resp: 18   Temp: 97.1 °F (36.2 °C)   TempSrc: Temporal   SpO2: 99%   Weight: 92.3 kg (203 lb 6 oz)   Height: 171.5 cm (67.5\")     Body mass index is 31.38 kg/m².    Physical Exam  Constitutional:       General: She is not in acute distress.     Appearance: Normal appearance. She is well-developed. She is not ill-appearing.   HENT:      Head: Normocephalic and atraumatic.   Eyes:      General: No scleral icterus.        Right eye: No discharge.         Left eye: No discharge.      Extraocular Movements: Extraocular movements intact.      Conjunctiva/sclera: Conjunctivae normal.   Cardiovascular:      Rate and Rhythm: Normal rate and regular rhythm.   Pulmonary:      Effort: Pulmonary effort is normal.   Musculoskeletal:        Feet:    Neurological:      Mental Status: She is alert and oriented to person, place, and time.   Psychiatric:         Mood and Affect: Mood normal.         Behavior: Behavior normal.         Thought Content: Thought content normal.         Judgment: Judgment normal.         Lab Results   Component Value Date    FREET4 1 11/15/2021    "       Assessment/Plan     Diagnoses and all orders for this visit:    1. Severe episode of recurrent major depressive disorder, without psychotic features (HCC) (Primary)  Comments:  Improving.  Increase duloxetine to 120 mg daily.  If not adequately controlled, recommended f/u with psychiatry at [place].  Orders:  -     DULoxetine (CYMBALTA) 60 MG capsule; Take 2 capsules by mouth Daily.  Dispense: 180 capsule; Refill: 2    2. Primary hypertension  Comments:  Increase lisinopril to 20 mg.  Orders:  -     lisinopril (PRINIVIL,ZESTRIL) 20 MG tablet; Take 1 tablet by mouth Daily.  Dispense: 90 tablet; Refill: 2    3. Chronic nonintractable headache, unspecified headache type  Comments:  Improving.  Continue higher dosage of duloxetine, Maxalt PRN.    4. Contusion of lesser toe of right foot without damage to nail, initial encounter  Comments:  Monitor.  Imaging declined.  Garcia taping PRN.    5. Class 1 obesity due to excess calories with serious comorbidity and body mass index (BMI) of 31.0 to 31.9 in adult  Comments:  Weight stable.        Medication and medication adverse effects discussed.  Drug education given and explained to patient. Patient verbalized understanding.  All questions presented by patient addressed.    Return in about 9 weeks (around 3/7/2022) for Recheck.       Patient was given instructions and counseling regarding his/her condition or for health maintenance advice. Please see specific information pulled into the AVS if appropriate.     I wore protective equipment throughout this patient encounter to include mask. Hand hygiene was performed before donning protective equipment and after removal when leaving the room.

## 2022-01-19 DIAGNOSIS — F33.1 MODERATE EPISODE OF RECURRENT MAJOR DEPRESSIVE DISORDER: ICD-10-CM

## 2022-01-19 RX ORDER — ESCITALOPRAM OXALATE 20 MG/1
TABLET ORAL
Qty: 90 TABLET | Refills: 2 | OUTPATIENT
Start: 2022-01-19

## 2022-03-07 ENCOUNTER — PATIENT MESSAGE (OUTPATIENT)
Dept: FAMILY MEDICINE CLINIC | Facility: CLINIC | Age: 67
End: 2022-03-07

## 2022-03-07 ENCOUNTER — OFFICE VISIT (OUTPATIENT)
Dept: FAMILY MEDICINE CLINIC | Facility: CLINIC | Age: 67
End: 2022-03-07

## 2022-03-07 VITALS
TEMPERATURE: 98 F | BODY MASS INDEX: 28.36 KG/M2 | HEIGHT: 68 IN | WEIGHT: 187.13 LBS | DIASTOLIC BLOOD PRESSURE: 86 MMHG | RESPIRATION RATE: 18 BRPM | HEART RATE: 72 BPM | SYSTOLIC BLOOD PRESSURE: 152 MMHG | OXYGEN SATURATION: 97 %

## 2022-03-07 DIAGNOSIS — I10 PRIMARY HYPERTENSION: ICD-10-CM

## 2022-03-07 DIAGNOSIS — E66.3 OVERWEIGHT WITH BODY MASS INDEX (BMI) OF 28 TO 28.9 IN ADULT: ICD-10-CM

## 2022-03-07 DIAGNOSIS — F33.2 SEVERE EPISODE OF RECURRENT MAJOR DEPRESSIVE DISORDER, WITHOUT PSYCHOTIC FEATURES: Primary | ICD-10-CM

## 2022-03-07 PROCEDURE — 99214 OFFICE O/P EST MOD 30 MIN: CPT | Performed by: FAMILY MEDICINE

## 2022-03-07 RX ORDER — ARIPIPRAZOLE 5 MG/1
5 TABLET ORAL DAILY
Qty: 30 TABLET | Refills: 5 | Status: SHIPPED | OUTPATIENT
Start: 2022-03-07 | End: 2022-05-09 | Stop reason: SDUPTHER

## 2022-03-07 RX ORDER — BREXPIPRAZOLE 1 MG/1
1 TABLET ORAL DAILY
Qty: 30 TABLET | Refills: 5 | Status: SHIPPED | OUTPATIENT
Start: 2022-03-07 | End: 2022-03-07

## 2022-03-07 NOTE — TELEPHONE ENCOUNTER
Janine Price 3/7/2022 1:30 PM EST      ----- Message -----  From: Adela Dowell  Sent: 3/7/2022 12:22 PM EST  To: Elif Tran  Clinical Pool  Subject: New medication     Dr. Torres, You were right about the insurance. The cost was going to be over $300 so I declined it. Could we go with the Abilify instead? Thank you!

## 2022-03-07 NOTE — PROGRESS NOTES
Chief Complaint   Patient presents with   • Hypertension       Subjective   Adela Dowell is a 66 y.o. female.     Hypertension  This is a chronic problem. The current episode started more than 1 year ago. The problem is controlled. Associated symptoms include anxiety and malaise/fatigue. Pertinent negatives include no chest pain, palpitations or shortness of breath. Risk factors for coronary artery disease include post-menopausal state. Current antihypertension treatment includes ACE inhibitors. The current treatment provides mild improvement.      BP remains elevated.  Home readings same as today.  She is working to lose weight by intermittent fasting (has lost about 18 lbs).    She would like to readdress her antidepressants today.  Currently on duloxetine 120 mg.  Symptoms continue.  PHQ9 of 23 today.  She has been assessed at the [facility].    Recommendations from [facility] doctor include:   1) titration of duloxetine to 90 mg then 120 mg (DONE)  2) reduction/wean of escitalopram from 20 mg to 10 mg then off (DONE)  3) possible augmentation with atypical antipsychotic.  4) possible use of TCA for headache and depression.  5) consultation with neurology ([facility] will put in referral)  6) MRI brain  7) thyroid labs - done and normal.    Headaches better.    I have reviewed relevant past medical, family, social and surgical history for this patient.  Medications review is done by myself, with patient.      Past Medical History :  Active Ambulatory Problems     Diagnosis Date Noted   • Moderate episode of recurrent major depressive disorder (HCC) 02/14/2019   • Hypertension 02/14/2019   • Disorder of bursae and tendons in shoulder region 09/23/2019   • Chronic headaches 02/14/2019   • Arthritis 02/14/2019   • Anemia 02/14/2019   • Obesity 09/23/2019   • H/O splenectomy 09/23/2019   • Osteopenia of lumbar spine 06/05/2021   • Depressive disorder 12/06/2021     Resolved Ambulatory Problems     Diagnosis Date  Noted   • Depression 09/23/2019   • Overweight 09/23/2019   • Viral upper respiratory tract infection 01/28/2020   • Acute bacterial sinusitis 01/28/2020     Past Medical History:   Diagnosis Date   • Allergic    • Anxiety    • Headache    • Presence of left artificial knee joint        Medication List:    Current Outpatient Medications:   •  aspirin-acetaminophen-caffeine (EXCEDRIN MIGRAINE) 250-250-65 MG per tablet, Take 2 tablets by mouth Every 8 (Eight) Hours As Needed for Headache., Disp: , Rfl:   •  CBD (cannabidiol) oral oil, Take 1 mL by mouth Daily. .75 ML  Patient places it under her tongue., Disp: , Rfl:   •  DULoxetine (CYMBALTA) 60 MG capsule, Take 2 capsules by mouth Daily., Disp: 180 capsule, Rfl: 2  •  lisinopril (PRINIVIL,ZESTRIL) 20 MG tablet, Take 1 tablet by mouth Daily., Disp: 90 tablet, Rfl: 2  •  Misc Natural Products (OSTEO BI-FLEX ADV TRIPLE ST PO), Take  by mouth Daily., Disp: , Rfl:   •  rizatriptan MLT (MAXALT-MLT) 10 MG disintegrating tablet, Take one at onset of a headache.  Repeat in two hours if needed., Disp: 12 tablet, Rfl: 5  •  vitamin D3 125 MCG (5000 UT) capsule capsule, Take 125 mcg by mouth., Disp: , Rfl:   •  zolpidem (AMBIEN) 5 MG tablet, Take 1 tablet by mouth At Night As Needed for Sleep., Disp: 14 tablet, Rfl: 0      Allergies   Allergen Reactions   • Levofloxacin Swelling and Myalgia     Joint swelling       Social History     Tobacco Use   • Smoking status: Never Smoker   • Smokeless tobacco: Never Used   Substance Use Topics   • Alcohol use: No       PHQ-2 Depression Screening  Little interest or pleasure in doing things? 3-->nearly every day   Feeling down, depressed, or hopeless? 3-->nearly every day   PHQ-2 Total Score 23     Patient screened positive for depression based on a PHQ-9 score of 23 on 3/7/2022. Follow-up recommendations include: Prescribed antidepressant medication treatment and follows with psychiatrist at [facility].        Review of Systems  "  Constitutional: Positive for fatigue and malaise/fatigue. Negative for fever and unexpected weight loss.   Eyes: Negative for visual disturbance.   Respiratory: Negative for shortness of breath.    Cardiovascular: Negative for chest pain, palpitations and leg swelling.   Neurological: Positive for headache (better for past few weeks). Negative for tremors, seizures, syncope, facial asymmetry and speech difficulty.   Psychiatric/Behavioral: Positive for decreased concentration, depressed mood and stress. Negative for hallucinations. The patient is nervous/anxious.          Objective   Vitals:    03/07/22 0933   BP: 152/86   BP Location: Right arm   Patient Position: Sitting   Cuff Size: Adult   Pulse: 72   Resp: 18   Temp: 98 °F (36.7 °C)   TempSrc: Temporal   SpO2: 97%   Weight: 84.9 kg (187 lb 2 oz)   Height: 171.5 cm (67.5\")     Body mass index is 28.88 kg/m².    Physical Exam  Constitutional:       General: She is not in acute distress.     Appearance: Normal appearance. She is well-developed.   HENT:      Head: Normocephalic and atraumatic.   Eyes:      General: No scleral icterus.        Right eye: No discharge.         Left eye: No discharge.      Extraocular Movements: Extraocular movements intact.      Conjunctiva/sclera: Conjunctivae normal.   Cardiovascular:      Rate and Rhythm: Normal rate and regular rhythm.      Heart sounds: Normal heart sounds. No murmur heard.  Pulmonary:      Effort: Pulmonary effort is normal.      Breath sounds: Normal breath sounds.   Musculoskeletal:         General: No swelling.      Cervical back: Normal range of motion and neck supple.      Right lower leg: No edema.      Left lower leg: No edema.   Skin:     General: Skin is warm.      Capillary Refill: Capillary refill takes less than 2 seconds.      Findings: No rash.   Neurological:      General: No focal deficit present.      Mental Status: She is alert.      Cranial Nerves: No cranial nerve deficit.   Psychiatric:    "      Mood and Affect: Mood normal.         Behavior: Behavior normal.         Thought Content: Thought content normal.         Judgment: Judgment normal.             Assessment/Plan     Diagnoses and all orders for this visit:    1. Severe episode of recurrent major depressive disorder, without psychotic features (HCC) (Primary)  -     Brexpiprazole (Rexulti) 1 MG tablet; Take 1 mg by mouth Daily.  Dispense: 30 tablet; Refill: 5    2. Primary hypertension    3. Overweight with body mass index (BMI) of 28 to 28.9 in adult    Add Rexulti.  Medication and medication adverse effects discussed.      Continue duloxetine 120 mg daily.    She will continue to monitor BP and work on lifestyle modifications/weight loss.  Rexuli may cause reduced BP.      Return in about 9 weeks (around 5/9/2022) for Recheck.       Patient was given instructions and counseling regarding his/her condition or for health maintenance advice. Please see specific information pulled into the AVS if appropriate.     I wore protective equipment throughout this patient encounter to include mask. Hand hygiene was performed before donning protective equipment and after removal when leaving the room.

## 2022-05-09 ENCOUNTER — OFFICE VISIT (OUTPATIENT)
Dept: FAMILY MEDICINE CLINIC | Facility: CLINIC | Age: 67
End: 2022-05-09

## 2022-05-09 VITALS
OXYGEN SATURATION: 98 % | SYSTOLIC BLOOD PRESSURE: 136 MMHG | HEIGHT: 67 IN | RESPIRATION RATE: 18 BRPM | BODY MASS INDEX: 29.1 KG/M2 | DIASTOLIC BLOOD PRESSURE: 88 MMHG | HEART RATE: 79 BPM | WEIGHT: 185.38 LBS | TEMPERATURE: 97.7 F

## 2022-05-09 DIAGNOSIS — E66.3 OVERWEIGHT (BMI 25.0-29.9): ICD-10-CM

## 2022-05-09 DIAGNOSIS — F33.2 SEVERE EPISODE OF RECURRENT MAJOR DEPRESSIVE DISORDER, WITHOUT PSYCHOTIC FEATURES: Primary | ICD-10-CM

## 2022-05-09 DIAGNOSIS — S80.12XA CONTUSION OF LEFT CALF, INITIAL ENCOUNTER: ICD-10-CM

## 2022-05-09 DIAGNOSIS — Z51.81 THERAPEUTIC DRUG MONITORING: ICD-10-CM

## 2022-05-09 DIAGNOSIS — I10 PRIMARY HYPERTENSION: ICD-10-CM

## 2022-05-09 PROBLEM — E66.9 OBESITY: Status: RESOLVED | Noted: 2019-09-23 | Resolved: 2022-05-09

## 2022-05-09 PROCEDURE — 99214 OFFICE O/P EST MOD 30 MIN: CPT | Performed by: FAMILY MEDICINE

## 2022-05-09 RX ORDER — ARIPIPRAZOLE 10 MG/1
10 TABLET ORAL DAILY
Qty: 90 TABLET | Refills: 1 | Status: SHIPPED | OUTPATIENT
Start: 2022-05-09 | End: 2022-09-15

## 2022-05-09 RX ORDER — CEPHALEXIN 500 MG/1
500 CAPSULE ORAL 3 TIMES DAILY
Qty: 30 CAPSULE | Refills: 0 | Status: SHIPPED | OUTPATIENT
Start: 2022-05-09 | End: 2022-05-19

## 2022-05-09 RX ORDER — LISINOPRIL 30 MG/1
30 TABLET ORAL DAILY
Qty: 90 TABLET | Refills: 1 | Status: SHIPPED | OUTPATIENT
Start: 2022-05-09 | End: 2022-09-29 | Stop reason: DRUGHIGH

## 2022-05-09 NOTE — PROGRESS NOTES
Chief Complaint   Patient presents with   • Depression   • Leg Pain       Subjective   Adela Dowell is a 67 y.o. female.     Depression  Visit Type: follow-up  Patient presents with the following symptoms: confusion, decreased concentration, depressed mood, excessive worry, feelings of hopelessness, feelings of worthlessness, insomnia, irritability, malaise, nervousness/anxiety and restlessness.  Patient is not experiencing: palpitations.  Frequency of symptoms: most days   Severity: causing significant distress   Sleep per night: 7 hours  Sleep quality: poor  Nighttime awakenings: several  Compliance with medications:  %  Side effects:  Insomnia  Leg Pain   The incident occurred more than 1 week ago. The incident occurred at home. Injury mechanism: horse stepped on leg. The pain is present in the left leg. The quality of the pain is described as aching. The pain is at a severity of 4/10. The pain is mild. The pain has been intermittent since onset. Associated symptoms include numbness and tingling. She reports no foreign bodies present. The symptoms are aggravated by movement and palpation. She has tried heat and rest for the symptoms. The treatment provided mild relief.      I have reviewed relevant past medical, family, social and surgical history for this patient.  Medications review is done by myself, with patient.    Patient has previously seen psychiatry at [facility].    Past Medical History :  Active Ambulatory Problems     Diagnosis Date Noted   • Moderate episode of recurrent major depressive disorder (HCC) 02/14/2019   • Hypertension 02/14/2019   • Disorder of bursae and tendons in shoulder region 09/23/2019   • Chronic headaches 02/14/2019   • Arthritis 02/14/2019   • Anemia 02/14/2019   • H/O splenectomy 09/23/2019   • Osteopenia of lumbar spine 06/05/2021   • Depressive disorder 12/06/2021     Resolved Ambulatory Problems     Diagnosis Date Noted   • Depression 09/23/2019   • Overweight  09/23/2019   • Obesity 09/23/2019   • Viral upper respiratory tract infection 01/28/2020   • Acute bacterial sinusitis 01/28/2020     Past Medical History:   Diagnosis Date   • Allergic    • Anxiety    • Headache    • Presence of left artificial knee joint        Medication List:    Current Outpatient Medications:   •  ARIPiprazole (Abilify) 5 MG tablet, Take 1 tablet by mouth Daily., Disp: 30 tablet, Rfl: 5  •  aspirin-acetaminophen-caffeine (EXCEDRIN MIGRAINE) 250-250-65 MG per tablet, Take 2 tablets by mouth Every 8 (Eight) Hours As Needed for Headache., Disp: , Rfl:   •  CBD (cannabidiol) oral oil, Take 1 mL by mouth Daily. .75 ML  Patient places it under her tongue., Disp: , Rfl:   •  DULoxetine (CYMBALTA) 60 MG capsule, Take 2 capsules by mouth Daily., Disp: 180 capsule, Rfl: 2  •  lisinopril (PRINIVIL,ZESTRIL) 20 MG tablet, Take 1 tablet by mouth Daily., Disp: 90 tablet, Rfl: 2  •  Misc Natural Products (OSTEO BI-FLEX ADV TRIPLE ST PO), Take  by mouth Daily., Disp: , Rfl:   •  rizatriptan MLT (MAXALT-MLT) 10 MG disintegrating tablet, Take one at onset of a headache.  Repeat in two hours if needed., Disp: 12 tablet, Rfl: 5  •  vitamin D3 125 MCG (5000 UT) capsule capsule, Take 125 mcg by mouth., Disp: , Rfl:   •  zolpidem (AMBIEN) 5 MG tablet, Take 1 tablet by mouth At Night As Needed for Sleep., Disp: 14 tablet, Rfl: 0    Allergies   Allergen Reactions   • Levofloxacin Swelling and Myalgia     Joint swelling       Social History     Tobacco Use   • Smoking status: Never Smoker   • Smokeless tobacco: Never Used   Substance Use Topics   • Alcohol use: No       Review of Systems   Constitutional: Positive for fatigue and irritability. Negative for fever.   Eyes: Negative for visual disturbance.   Cardiovascular: Negative for chest pain, palpitations and leg swelling.        Heart pounds a couple of times a week.  BP at home running >140.   Skin: Positive for bruise (left lower leg).   Neurological: Positive for  "tingling, numbness and confusion.   Psychiatric/Behavioral: Positive for decreased concentration, depressed mood and stress. Negative for self-injury. The patient is nervous/anxious and has insomnia.          Objective   Vitals:    05/09/22 0938   BP: 136/88   BP Location: Right arm   Patient Position: Sitting   Cuff Size: Adult   Pulse: 79   Resp: 18   Temp: 97.7 °F (36.5 °C)   TempSrc: Temporal   SpO2: 98%   Weight: 84.1 kg (185 lb 6 oz)   Height: 170.2 cm (67\")     Body mass index is 29.03 kg/m².    Physical Exam  Constitutional:       General: She is not in acute distress.     Appearance: Normal appearance. She is well-developed.   HENT:      Head: Normocephalic and atraumatic.   Eyes:      General: No scleral icterus.        Right eye: No discharge.         Left eye: No discharge.      Extraocular Movements: Extraocular movements intact.      Conjunctiva/sclera: Conjunctivae normal.   Cardiovascular:      Rate and Rhythm: Normal rate and regular rhythm.      Heart sounds: Normal heart sounds. No murmur heard.  Pulmonary:      Effort: Pulmonary effort is normal.      Breath sounds: Normal breath sounds.   Musculoskeletal:         General: No swelling.      Cervical back: Normal range of motion and neck supple.      Right lower leg: No edema.      Left lower leg: No edema.   Skin:     General: Skin is warm.      Capillary Refill: Capillary refill takes less than 2 seconds.      Findings: No rash.          Neurological:      General: No focal deficit present.      Mental Status: She is alert.      Cranial Nerves: No cranial nerve deficit.   Psychiatric:         Attention and Perception: Attention normal.         Mood and Affect: Mood is depressed. Affect is tearful.         Speech: Speech normal.         Behavior: Behavior normal.         Thought Content: Thought content normal.         Cognition and Memory: Cognition and memory normal.         Judgment: Judgment normal.         Assessment/Plan     Diagnoses and " all orders for this visit:    1. Severe episode of recurrent major depressive disorder, without psychotic features (HCC) (Primary)  -     ARIPiprazole (Abilify) 10 MG tablet; Take 1 tablet by mouth Daily.  Dispense: 90 tablet; Refill: 1    2. Primary hypertension  -     lisinopril (PRINIVIL,ZESTRIL) 30 MG tablet; Take 1 tablet by mouth Daily.  Dispense: 90 tablet; Refill: 1    3. Therapeutic drug monitoring  -     Lipid Panel; Future  -     Basic metabolic panel; Future    4. Contusion of left calf, initial encounter  -     cephalexin (KEFLEX) 500 MG capsule; Take 1 capsule by mouth 3 (Three) Times a Day for 10 days.  Dispense: 30 capsule; Refill: 0    5. Overweight (BMI 25.0-29.9)    Increase Abilify to 10 mg daily.  She was asked to contact me in 3-6 weeks if additional adjustment needed.     Advised f/u with psychiatry at [facility].  ? Benefit ECT.    She will return in June for fasting labs.    Apply warm compresses to LE contusion.  RX as above.    Medication and medication adverse effects discussed.  Drug education given and explained to patient. Patient verbalized understanding.  All questions presented by patient addressed.    Return if symptoms worsen or fail to improve.   Patient was informed of my departure from [facility] in July and the protocol for establishing with a new [facility] provider.       Patient was given instructions and counseling regarding his/her condition or for health maintenance advice. Please see specific information pulled into the AVS if appropriate.     I wore protective equipment throughout this patient encounter to include mask. Hand hygiene was performed before donning protective equipment and after removal when leaving the room.

## 2022-06-01 DIAGNOSIS — I10 PRIMARY HYPERTENSION: ICD-10-CM

## 2022-06-01 RX ORDER — LISINOPRIL 10 MG/1
TABLET ORAL
Qty: 90 TABLET | Refills: 1 | OUTPATIENT
Start: 2022-06-01

## 2022-06-02 DIAGNOSIS — I10 PRIMARY HYPERTENSION: ICD-10-CM

## 2022-06-02 RX ORDER — LISINOPRIL 30 MG/1
30 TABLET ORAL DAILY
Qty: 90 TABLET | Refills: 1 | Status: CANCELLED | OUTPATIENT
Start: 2022-06-02

## 2022-06-02 NOTE — TELEPHONE ENCOUNTER
Caller: DowellAdela    Relationship: Self    Best call back number: [phone]  Requested Prescriptions:   Requested Prescriptions     Pending Prescriptions Disp Refills   • lisinopril (PRINIVIL,ZESTRIL) 30 MG tablet 90 tablet 1     Sig: Take 1 tablet by mouth Daily.        Pharmacy where request should be sent: [id number] - [place], [place] - [address] - [phone]  - [phone] FX     Additional details provided by patient: PATIENT STATES SHE RECEIVED A NOTICE FROM [facility] THAT THIS MEDICATION WAS NOT GOING TO BE REFILLED BECAUSE HER PCP HAD NOT SENT IN A REQUEST FOR IT.   PATIENT IS REQUESTING THAT DR. QUEEN PLEASE REFILL THIS FOR HER.      Does the patient have less than a 3 day supply:  [] Yes  [x] No    Antonio PAULINO Rep   06/02/22 16:54 EDT

## 2022-06-02 NOTE — TELEPHONE ENCOUNTER
I don't know what the issue is.  This RX was sent in 3 wks ago at her visit, with refills.  The request yesterday was for 20 mg tablets, which was denied because of the dosage change.  Please confirm which dosage she is taking (supposed to be on 30 mg).

## 2022-09-02 DIAGNOSIS — F33.2 SEVERE EPISODE OF RECURRENT MAJOR DEPRESSIVE DISORDER, WITHOUT PSYCHOTIC FEATURES: ICD-10-CM

## 2022-09-02 RX ORDER — DULOXETIN HYDROCHLORIDE 30 MG/1
CAPSULE, DELAYED RELEASE ORAL
Qty: 90 CAPSULE | Refills: 2 | OUTPATIENT
Start: 2022-09-02

## 2022-09-02 NOTE — TELEPHONE ENCOUNTER
According to the pts chart, Dr Torres had up the dosage to 60 mg of  The duloxetine Bid.  It looks like she has refills until she see her new provider.  If not I will refill it only until that appt.

## 2022-09-07 RX ORDER — LISINOPRIL 20 MG/1
20 TABLET ORAL DAILY
COMMUNITY
Start: 2022-07-01 | End: 2022-09-15 | Stop reason: SDUPTHER

## 2022-09-07 RX ORDER — DULOXETIN HYDROCHLORIDE 30 MG/1
30 CAPSULE, DELAYED RELEASE ORAL
COMMUNITY
Start: 2022-06-02

## 2022-09-07 NOTE — PROGRESS NOTES
Subjective   Adela Dowell is a 67 y.o. female.   Chief Complaint   Patient presents with   • Hypertension   • Depression   • Establish Care     Pt is transferring from Dr. Arabella Torres       History of Present Illness     Hypertension:  -Chronic  -Controlled with medication (Lisinopril), no side effects, or compliance problems  -CAD risks:  None  -Last BP was 136/88 on 05/09/2022,  BP today is 122/60   - has been checking bp at home: 121/88  - has bottles of lisinopril 30 mg, 20mg and 10mg at home from past prescriptions    Depression:  -Chronic  -Controlled with medication (Cymbalta 30 and 60 mg), no side effects or compliance problems  -Pt is currently seeing Dr. Delvin De Jesus (psychiatrist). [facility]  -She is currently taking TMS sessions 5 days a week, she will start going 1 day a week next week  - states great success with this method and they have been able to wean her off her medications  - headaches are gone now, bp is now controlled, sleeping better, think more clearly, nightmare  - right now is weaned down to just Cymbalta 90mg with plan to keep decreasing the dose.     Acute left shoulder pain S/P fall  - held door open for horses, they ran into it and fell on left shoulder. Happened 8/22/2022  - having pain, slowly improving  - denies: numbness, tingling, muscle weakness  - has been using ibuprofen and heat (help)   -Worse pain is located over the deltoid and upper bicep  - has done PT before for her right shoulder due to rotator cuff issues  -Follows with an orthopedic physician due to right rotator cuff      The following portions of the patient's history were reviewed and updated as appropriate: allergies, current medications, past family history, past medical history, past social history, past surgical history and problem list.    Patient Active Problem List   Diagnosis   • Moderate episode of recurrent major depressive disorder (HCC)   • Hypertension   • Disorder of bursae and tendons in  shoulder region   • Chronic headaches   • Arthritis   • Anemia   • H/O splenectomy   • Osteopenia of lumbar spine   • Depressive disorder   • Class 1 obesity due to excess calories with body mass index (BMI) of 32.0 to 32.9 in adult       Current Outpatient Medications on File Prior to Visit   Medication Sig Dispense Refill   • aspirin-acetaminophen-caffeine (EXCEDRIN MIGRAINE) 250-250-65 MG per tablet Take 2 tablets by mouth Every 8 (Eight) Hours As Needed for Headache.     • DULoxetine (CYMBALTA) 30 MG capsule TAKE 1 CAPSULE BY MOUTH EVERY DAY *TAKE WITH 60MG DAILY FOR A TOTAL DOSAGE OF 90MG     • DULoxetine (CYMBALTA) 60 MG capsule Take 2 capsules by mouth Daily. 180 capsule 2   • lisinopril (PRINIVIL,ZESTRIL) 30 MG tablet Take 1 tablet by mouth Daily. 90 tablet 1   • Misc Natural Products (OSTEO BI-FLEX ADV TRIPLE ST PO) Take  by mouth Daily.     • rizatriptan MLT (MAXALT-MLT) 10 MG disintegrating tablet Take one at onset of a headache.  Repeat in two hours if needed. 12 tablet 5   • vitamin D3 125 MCG (5000 UT) capsule capsule Take 125 mcg by mouth.     • [DISCONTINUED] ARIPiprazole (Abilify) 10 MG tablet Take 1 tablet by mouth Daily. 90 tablet 1   • [DISCONTINUED] CBD (cannabidiol) oral oil Take 1 mL by mouth Daily. .75 ML  Patient places it under her tongue.     • [DISCONTINUED] lisinopril (PRINIVIL,ZESTRIL) 20 MG tablet Take 20 mg by mouth Daily.     • [DISCONTINUED] zolpidem (AMBIEN) 5 MG tablet Take 1 tablet by mouth At Night As Needed for Sleep. 14 tablet 0     No current facility-administered medications on file prior to visit.     Current outpatient and discharge medications have been reconciled for the patient.  Reviewed by: Janet Mendieta DO      Allergies   Allergen Reactions   • Levofloxacin Swelling and Myalgia     Joint swelling       Objective   Visit Vitals  /60 (BP Location: Right arm, Patient Position: Sitting, Cuff Size: Adult)   Pulse 84   Temp 97.1 °F (36.2 °C) (Skin)   Resp 16   Ht  "170.2 cm (67\")   Wt 93.3 kg (205 lb 9.6 oz)   SpO2 98%   BMI 32.20 kg/m²       Physical Exam  HENT:      Head: Normocephalic.   Eyes:      Conjunctiva/sclera: Conjunctivae normal.   Cardiovascular:      Rate and Rhythm: Normal rate and regular rhythm.      Pulses: Normal pulses.      Heart sounds: Normal heart sounds.   Pulmonary:      Effort: Pulmonary effort is normal. No respiratory distress.      Breath sounds: Normal breath sounds. No wheezing, rhonchi or rales.   Musculoskeletal:      Comments: - Strength equal bilaterally in hands and arms  -Patient able to challenge muscles and rotator cuff without much pain  -Pain with active abduction of left shoulder.  No pain with passive abduction  - Nontender to palpation of the deltoid   Skin:     General: Skin is warm and dry.   Neurological:      Mental Status: She is alert.      Motor: No weakness.      Coordination: Coordination normal.      Deep Tendon Reflexes: Reflexes normal.       Diagnoses and all orders for this visit:    1. Primary hypertension (Primary)  - good pressures in office and home, decreased due to improving anxiety and depression  - decreasing lisinopril to 20mg, will do a nurse check in 2 weeks, if still good will reduce further to 10mg lisinopril  - signed [employer] request for automatic BP cuff    2. Depressive disorder  - improving with Dr. De Jesus, will monitor pt as he continues treatment     3. Left shoulder pain  - continue heat, ibuprofen  - start Voltaren gel (can get this over the counter)  - left shoulder xray today  - try home shoulder exercises and stretches for 2-3 weeks (attached to instructions). If no improvement, come back and likely physical therapy    Expected course, medications, and adverse effects discussed as appropriate.  Call or return if worsening or persistent symptoms.  I wore protective equipment throughout this patient encounter to include mask and eye protection. Hand hygiene was performed before " donning protective equipment and after removal when leaving the room.       This document is intended for medical expert use only. Reading of this document by patients and/or patient's family without participating medical staff guidance may result in misinterpretation and unintended morbidity. Any interpretation of such data is the responsibility of the patient and/or family member responsible for the patient in concert with their primary or specialist providers, not to be left for sources of online searches such as [employer], [employer] or similar queries. Relying on these approaches to knowledge may result in misinterpretation, misguided goals of care and even death should patients or family members try recommendations outside of the realm of professional medical care.

## 2022-09-15 ENCOUNTER — OFFICE VISIT (OUTPATIENT)
Dept: FAMILY MEDICINE CLINIC | Facility: CLINIC | Age: 67
End: 2022-09-15

## 2022-09-15 ENCOUNTER — HOSPITAL ENCOUNTER (OUTPATIENT)
Dept: GENERAL RADIOLOGY | Facility: HOSPITAL | Age: 67
Discharge: HOME | End: 2022-09-15
Admitting: STUDENT IN AN ORGANIZED HEALTH CARE EDUCATION/TRAINING PROGRAM

## 2022-09-15 VITALS
HEIGHT: 67 IN | OXYGEN SATURATION: 98 % | HEART RATE: 84 BPM | RESPIRATION RATE: 16 BRPM | WEIGHT: 205.6 LBS | TEMPERATURE: 97.1 F | BODY MASS INDEX: 32.27 KG/M2 | SYSTOLIC BLOOD PRESSURE: 122 MMHG | DIASTOLIC BLOOD PRESSURE: 60 MMHG

## 2022-09-15 DIAGNOSIS — F32.A DEPRESSIVE DISORDER: ICD-10-CM

## 2022-09-15 DIAGNOSIS — M25.512 ACUTE PAIN OF LEFT SHOULDER: ICD-10-CM

## 2022-09-15 DIAGNOSIS — I10 PRIMARY HYPERTENSION: Primary | ICD-10-CM

## 2022-09-15 DIAGNOSIS — M25.512 CHRONIC LEFT SHOULDER PAIN: ICD-10-CM

## 2022-09-15 DIAGNOSIS — G89.29 CHRONIC LEFT SHOULDER PAIN: ICD-10-CM

## 2022-09-15 PROBLEM — F33.1 MODERATE EPISODE OF RECURRENT MAJOR DEPRESSIVE DISORDER: Status: RESOLVED | Noted: 2019-02-14 | Resolved: 2022-09-15

## 2022-09-15 PROBLEM — E66.811 CLASS 1 OBESITY DUE TO EXCESS CALORIES WITH BODY MASS INDEX (BMI) OF 32.0 TO 32.9 IN ADULT: Status: ACTIVE | Noted: 2022-09-15

## 2022-09-15 PROBLEM — E66.09 CLASS 1 OBESITY DUE TO EXCESS CALORIES WITH BODY MASS INDEX (BMI) OF 32.0 TO 32.9 IN ADULT: Status: ACTIVE | Noted: 2022-09-15

## 2022-09-15 PROCEDURE — 73030 X-RAY EXAM OF SHOULDER: CPT

## 2022-09-15 PROCEDURE — 99214 OFFICE O/P EST MOD 30 MIN: CPT | Performed by: STUDENT IN AN ORGANIZED HEALTH CARE EDUCATION/TRAINING PROGRAM

## 2022-09-15 NOTE — PATIENT INSTRUCTIONS
-  decreasing lisinopril to 20mg, will do a nurse check in 2 weeks,    - continue heat, ibuprofen  - start Voltaren gel (can get this over the counter)  - left shoulder xray  - try home shoulder exercises and stretches for 2-3 weeks (attached to instructions). If no improvement, come back and likely physical therapy

## 2022-09-15 NOTE — ASSESSMENT & PLAN NOTE
Patient's (Body mass index is 32.2 kg/m².) indicates that they are obese (BMI >30) with health conditions that include hypertension . Weight is improving with lifestyle modifications. BMI is is above average; BMI management plan is completed. We discussed portion control and increasing exercise.

## 2022-09-29 ENCOUNTER — CLINICAL SUPPORT (OUTPATIENT)
Dept: FAMILY MEDICINE CLINIC | Facility: CLINIC | Age: 67
End: 2022-09-29

## 2022-09-29 DIAGNOSIS — I10 PRIMARY HYPERTENSION: ICD-10-CM

## 2022-09-29 RX ORDER — LISINOPRIL 30 MG/1
30 TABLET ORAL DAILY
Qty: 90 TABLET | Refills: 1 | Status: SHIPPED | OUTPATIENT
Start: 2022-09-29

## 2022-09-29 RX ORDER — LISINOPRIL 20 MG/1
TABLET ORAL
Qty: 90 TABLET | Refills: 2 | OUTPATIENT
Start: 2022-09-29

## 2022-09-29 NOTE — TELEPHONE ENCOUNTER
Pt arrived today for nurse visit to check bp after decreasing lisinopril from 30mg to 20mg. BP was noted to be 144/101 and 148/90. Pt forgot to bring bp log from home    Increasing pt back up to 30mg and another nurse recheck in 2 weeks to ensure bp is back in appropriate range

## 2022-10-13 ENCOUNTER — CLINICAL SUPPORT (OUTPATIENT)
Dept: FAMILY MEDICINE CLINIC | Facility: CLINIC | Age: 67
End: 2022-10-13

## 2022-10-13 VITALS — DIASTOLIC BLOOD PRESSURE: 82 MMHG | SYSTOLIC BLOOD PRESSURE: 140 MMHG

## 2022-10-13 NOTE — PROGRESS NOTES
Blood pressure check:  Last two blood pressure readings;  122/60     09/15/2022  136/88     05/09/2022  BP today is 140/82   Manual, left arm, sitting  Pt did not bring personal BP machine  Pt has a BP Log to be reviewed  Medication Lisinopril 30 mg daily

## 2022-10-14 ENCOUNTER — TELEPHONE (OUTPATIENT)
Dept: FAMILY MEDICINE CLINIC | Facility: CLINIC | Age: 67
End: 2022-10-14

## 2022-10-14 DIAGNOSIS — I10 PRIMARY HYPERTENSION: Primary | ICD-10-CM

## 2022-10-14 RX ORDER — AMLODIPINE BESYLATE 5 MG/1
5 TABLET ORAL DAILY
Qty: 30 TABLET | Refills: 1 | Status: SHIPPED | OUTPATIENT
Start: 2022-10-14 | End: 2022-10-28 | Stop reason: SDUPTHER

## 2022-10-14 NOTE — TELEPHONE ENCOUNTER
WILLEM 10/14/2022, 10:35 am advised pt per Dr. Mendieta to continue recording blood pressure reading, continue Lisnopril, a new medication has been added, call and make an appt for nurse visit in 2 weeks to recheck BP

## 2022-10-14 NOTE — TELEPHONE ENCOUNTER
Patient's blood pressure reading on average is about 138/96.  There is a string of about 3 days of 146/99    Per note, pt taking lisinopril 30mg daily. Will add Amlodipine 5mg and recheck pt in 2 weeks

## 2022-10-27 ENCOUNTER — CLINICAL SUPPORT (OUTPATIENT)
Dept: FAMILY MEDICINE CLINIC | Facility: CLINIC | Age: 67
End: 2022-10-27

## 2022-10-27 DIAGNOSIS — I10 PRIMARY HYPERTENSION: ICD-10-CM

## 2022-10-27 PROCEDURE — 99211 OFF/OP EST MAY X REQ PHY/QHP: CPT | Performed by: STUDENT IN AN ORGANIZED HEALTH CARE EDUCATION/TRAINING PROGRAM

## 2022-10-28 RX ORDER — AMLODIPINE BESYLATE 5 MG/1
5 TABLET ORAL DAILY
Qty: 90 TABLET | Refills: 3 | Status: SHIPPED | OUTPATIENT
Start: 2022-10-28

## 2022-10-28 NOTE — PROGRESS NOTES
Blood pressure check:    -Last 2 BP readings:  -140/82    10/13/2022  -122/60    09/15/2022    -BP today:    -Pt's BP monitor reading:  -141/88  Left arm, sitting    -Manual BP  -118/64  Left arm, sitting    -Medication:   Amlodipine 5 mg daily, Lisinopril 30 mg daily          
Patient doing well on lisinopril 30 mg and amlodipine 5 mg combination.  Even though her blood pressure cuff is reading rather high, the manual cuff in office is reading reasonable levels.  Per patient's home blood pressure cuff, patient's blood pressure improved after starting the amlodipine.  Asked the MA tell patient potential side effect of lower extremity swelling and to let us know if she starts feeling dizzy or having side effect  
n/a

## 2023-01-10 DIAGNOSIS — F33.2 SEVERE EPISODE OF RECURRENT MAJOR DEPRESSIVE DISORDER, WITHOUT PSYCHOTIC FEATURES: ICD-10-CM

## 2023-01-11 RX ORDER — DULOXETIN HYDROCHLORIDE 60 MG/1
CAPSULE, DELAYED RELEASE ORAL
Qty: 90 CAPSULE | Refills: 2 | Status: SHIPPED | OUTPATIENT
Start: 2023-01-11 | End: 2023-03-02

## 2023-02-14 NOTE — PROGRESS NOTES
Chief Complaint   Patient presents with   • Depression   • Anxiety       Subjective   Adela Dowell is a 65 y.o. female.     Patient states she is suicidal and is having thoughts often of ending her life.  But she as no definitive plan.  She reports that suicide does run in her family and she knows the stress and heartache for the family that comes after, and this is keeping her from acting on her thoughts.  That her last thought off suicide was yesterday.  She does not have an active plan at this time.  She does follow with Dr. Dillard.  Last visit about January 2020.  Most recent medication adjustment was adding duloxetine to escitalopram (which helped for awhile).  Patient reports being depressed since a child and medications only help for a little while.  She has been to counseling before, and this helped.      Depression  Visit Type: follow-up (depression persent since a child, acutely worsened over the past 3 mo)  Patient presents with the following symptoms: decreased concentration, depressed mood, excessive worry, fatigue, feelings of hopelessness, feelings of worthlessness, insomnia, irritability, memory impairment, nervousness/anxiety, palpitations, restlessness, shortness of breath, suicidal ideas and thoughts of death.  Patient is not experiencing: chest pain, confusion, hyperventilation, nausea and weight loss.  Frequency of symptoms: constantly   Severity: severe   Sleep quality: poor  Nighttime awakenings: several  Compliance with medications:  0-25%  Side effects:  Headaches, insomnia and join pain       I have reviewed and updated her medications, medical history and problem list during today's office visit.       Past Medical History :  Active Ambulatory Problems     Diagnosis Date Noted   • Moderate episode of recurrent major depressive disorder (CMS/[facility]) 02/14/2019   • Hypertension 02/14/2019   • Disorder of bursae and tendons in shoulder region 09/23/2019   • Chronic headaches 02/14/2019   •  Arthritis 02/14/2019   • Anemia 02/14/2019   • Overweight 09/23/2019   • Obesity 09/23/2019   • H/O splenectomy 09/23/2019     Resolved Ambulatory Problems     Diagnosis Date Noted   • Depression 09/23/2019   • Viral upper respiratory tract infection 01/28/2020   • Acute bacterial sinusitis 01/28/2020     Past Medical History:   Diagnosis Date   • Allergic    • Anxiety    • Headache    • Presence of left artificial knee joint        Medication List:    Current Outpatient Medications:   •  aspirin-acetaminophen-caffeine (EXCEDRIN MIGRAINE) 250-250-65 MG per tablet, Take 2 tablets by mouth Every 8 (Eight) Hours As Needed for Headache., Disp: , Rfl:   •  CBD (cannabidiol) oral oil, Take 1 mL by mouth Daily. .75 ML  Patient places it under her tongue., Disp: , Rfl:   •  DULoxetine (CYMBALTA) 30 MG capsule, , Disp: , Rfl:   •  escitalopram (LEXAPRO) 20 MG tablet, Take 1 tablet by mouth Every Night., Disp: , Rfl:   •  Misc Natural Products (OSTEO BI-FLEX ADV TRIPLE ST PO), Take  by mouth Daily., Disp: , Rfl:     Allergies   Allergen Reactions   • Levofloxacin Swelling and Myalgia     Joint swelling       Social History     Tobacco Use   • Smoking status: Never Smoker   • Smokeless tobacco: Never Used   Substance Use Topics   • Alcohol use: No     Frequency: Never       PHQ-2 Depression Screening  Little interest or pleasure in doing things? 3   Feeling down, depressed, or hopeless? 3   PHQ-2 Total Score 25     PHQ-9 Depression Screening  Little interest or pleasure in doing things? 3   Feeling down, depressed, or hopeless? 3   Trouble falling or staying asleep, or sleeping too much? 3   Feeling tired or having little energy? 3   Poor appetite or overeating? 3   Feeling bad about yourself - or that you are a failure or have let yourself or your family down? 3   Trouble concentrating on things, such as reading the newspaper or watching television? 3   Moving or speaking so slowly that other people could have noticed? Or the  "opposite - being so fidgety or restless that you have been moving around a lot more than usual? 2   Thoughts that you would be better off dead, or of hurting yourself in some way? 2   PHQ-9 Total Score 25   If you checked off any problems, how difficult have these problems made it for you to do your work, take care of things at home, or get along with other people? Extremely dIfficult         Review of Systems   Constitutional: Positive for irritability. Negative for unexpected weight loss.   Eyes: Negative for visual disturbance.   Respiratory: Positive for shortness of breath.    Cardiovascular: Positive for palpitations. Negative for chest pain.   Gastrointestinal: Negative for abdominal pain, diarrhea and vomiting.   Neurological: Negative for dizziness, tremors, seizures, syncope, weakness and confusion.   Psychiatric/Behavioral: Positive for decreased concentration, sleep disturbance, suicidal ideas, depressed mood and stress. Negative for hallucinations and self-injury. The patient is nervous/anxious and has insomnia.          Objective   Vitals:    09/18/20 0928   BP: 121/72   Pulse: 86   Temp: 95 °F (35 °C)   TempSrc: Temporal   SpO2: 95%   Weight: 88.6 kg (195 lb 6.4 oz)   Height: 170.2 cm (67\")     Body mass index is 30.6 kg/m².    Physical Exam  Constitutional:       General: She is not in acute distress.     Appearance: Normal appearance. She is not ill-appearing.   Eyes:      Conjunctiva/sclera: Conjunctivae normal.   Cardiovascular:      Rate and Rhythm: Normal rate and regular rhythm.      Heart sounds: Normal heart sounds. No murmur.   Pulmonary:      Effort: Pulmonary effort is normal.      Breath sounds: Normal breath sounds.   Skin:     General: Skin is warm.   Neurological:      General: No focal deficit present.      Mental Status: She is alert and oriented to person, place, and time. Mental status is at baseline.   Psychiatric:         Attention and Perception: Attention normal.         Mood " and Affect: Mood is depressed. Affect is tearful. Affect is not labile or inappropriate.         Speech: Speech normal. Speech is not rapid and pressured or tangential.         Behavior: Behavior normal. Behavior is not agitated, slowed, aggressive or hyperactive. Behavior is cooperative.         Thought Content: Thought content is not delusional. Thought content includes suicidal ideation. Thought content does not include homicidal ideation. Thought content does not include homicidal or suicidal plan.         Cognition and Memory: Cognition and memory normal.         Judgment: Judgment is not inappropriate.           Assessment/Plan     Diagnoses and all orders for this visit:    1. Severe episode of recurrent major depressive disorder, without psychotic features (CMS/HCC) (Primary)  -     DULoxetine (CYMBALTA) 60 MG capsule; Take 1 capsule by mouth Every Night.  Dispense: 30 capsule; Refill: 2  -     escitalopram (LEXAPRO) 20 MG tablet; Take 1 tablet by mouth Every Night.  Dispense: 30 tablet; Refill: 2    We discussed treatment options at this time.  As she has no active plan, she will proceed with outpatient management.  I will adjust her medications today.  Medication options discussed, and she opts to increase duloxetine.  Signs/symptoms of serotonin syndrome discussed.  Watch for worsening thoughts of suicide.  I have encouraged her to reach out to her counselor TODAY to get a f/u session and to reach out to Dr. Dillard's office to schedule an appointment.  She reports guns in the home, but they are locked up and she is unable to access the firearms.  She was advised to remove items she may use to commit suicide from the home.  She understands medication adjustment may take a few weeks to reduce symptoms.  F/U with me in 4 weeks, sooner if needed.    Return in about 4 weeks (around 10/16/2020) for Recheck.  Unable to contact Dr. Dillard's office today, as they are closed on Fridays.     I wore protective  equipment throughout this patient encounter to include mask and eye protection. Hand hygiene was performed before donning protective equipment and after removal when leaving the room.  Patient also wore a mask.         Show Applicator Variable?: Yes Render Post-Care Instructions In Note?: no Number Of Freeze-Thaw Cycles: 2 freeze-thaw cycles Post-Care Instructions: I reviewed with the patient in detail post-care instructions. Patient is to wear sunprotection, and avoid picking at any of the treated lesions. Pt may apply Vaseline to crusted or scabbing areas. Medical Necessity Clause: This procedure was medically necessary because the lesions that were treated were: Medical Necessity Information: It is in your best interest to select a reason for this procedure from the list below. All of these items fulfill various CMS LCD requirements except the new and changing color options. Detail Level: Detailed Consent: The patient's consent was obtained including but not limited to risks of crusting, scabbing, blistering, scarring, darker or lighter pigmentary change, recurrence, incomplete removal and infection. Duration Of Freeze Thaw-Cycle (Seconds): 5-10 Duration Of Freeze Thaw-Cycle (Seconds): 3 Spray Paint Text: The liquid nitrogen was applied to the skin utilizing a spray paint frosting technique.

## 2023-03-02 ENCOUNTER — OFFICE VISIT (OUTPATIENT)
Dept: FAMILY MEDICINE CLINIC | Facility: CLINIC | Age: 68
End: 2023-03-02
Payer: MEDICARE

## 2023-03-02 VITALS
HEART RATE: 91 BPM | BODY MASS INDEX: 28.44 KG/M2 | OXYGEN SATURATION: 98 % | DIASTOLIC BLOOD PRESSURE: 60 MMHG | RESPIRATION RATE: 16 BRPM | TEMPERATURE: 98.9 F | HEIGHT: 67 IN | WEIGHT: 181.2 LBS | SYSTOLIC BLOOD PRESSURE: 122 MMHG

## 2023-03-02 DIAGNOSIS — R31.9 HEMATURIA, UNSPECIFIED TYPE: ICD-10-CM

## 2023-03-02 DIAGNOSIS — E66.09 CLASS 1 OBESITY DUE TO EXCESS CALORIES WITHOUT SERIOUS COMORBIDITY WITH BODY MASS INDEX (BMI) OF 32.0 TO 32.9 IN ADULT: ICD-10-CM

## 2023-03-02 DIAGNOSIS — S62.102D CLOSED FRACTURE OF LEFT WRIST WITH ROUTINE HEALING, SUBSEQUENT ENCOUNTER: Primary | ICD-10-CM

## 2023-03-02 LAB
BILIRUB BLD-MCNC: NEGATIVE MG/DL
CLARITY, POC: ABNORMAL
COLOR UR: YELLOW
GLUCOSE UR STRIP-MCNC: NEGATIVE MG/DL
KETONES UR QL: NEGATIVE
LEUKOCYTE EST, POC: ABNORMAL
NITRITE UR-MCNC: NEGATIVE MG/ML
PH UR: 5 [PH] (ref 5–8)
PROT UR STRIP-MCNC: ABNORMAL MG/DL
RBC # UR STRIP: ABNORMAL /UL
SP GR UR: 1.02 (ref 1–1.03)
UROBILINOGEN UR QL: NORMAL

## 2023-03-02 PROCEDURE — 81002 URINALYSIS NONAUTO W/O SCOPE: CPT | Performed by: STUDENT IN AN ORGANIZED HEALTH CARE EDUCATION/TRAINING PROGRAM

## 2023-03-02 PROCEDURE — 99213 OFFICE O/P EST LOW 20 MIN: CPT | Performed by: STUDENT IN AN ORGANIZED HEALTH CARE EDUCATION/TRAINING PROGRAM

## 2023-03-02 RX ORDER — TAMSULOSIN HYDROCHLORIDE 0.4 MG/1
1 CAPSULE ORAL DAILY
Qty: 30 CAPSULE | Refills: 0 | Status: CANCELLED | OUTPATIENT
Start: 2023-03-02

## 2023-03-02 RX ORDER — CEFDINIR 300 MG/1
300 CAPSULE ORAL 2 TIMES DAILY
Qty: 24 CAPSULE | Refills: 0 | Status: SHIPPED | OUTPATIENT
Start: 2023-03-02 | End: 2023-03-14

## 2023-03-02 RX ORDER — HYDROCODONE BITARTRATE AND ACETAMINOPHEN 5; 325 MG/1; MG/1
TABLET ORAL
COMMUNITY
Start: 2023-02-21 | End: 2023-04-04

## 2023-03-02 NOTE — PROGRESS NOTES
Subjective   Adela Dowell is a 67 y.o. female.   Chief Complaint   Patient presents with   • Hospital Follow Up Visit     [facility] 02/18/2023       History of Present Illness     Hospital follow up:  Adela was seen at [facility] ER 02/18/2023.   She was seen for Left arm and shoulder pain.   Labs that were performed during the encounter included:  None  Diagnostic studies that were performed included:  X-ray left shoulder, left wrist  Contusion left shoulder, closed fracture of left distal radius and ulna  Medication changes:  Norco 5-325 mg    Course of events  -Patient presented to [facility] ER on 2/18/2023.  She was pushed by a horse and landed on her left side, having severe left wrist and shoulder pain after.  - Left shoulder x-ray showed no fracture, dislocation or other osseous abnormality  - Left wrist x-ray showed minimally displaced complete fracture of the left distal radius and minimally displaced fracture of the ulnar styloid process.  Borderline widening of the scapholunate interval.  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  She was given a hard cast of her right wrist and shoulder sling she supposed to keep for 3 to 4 weeks.  It later get replaced with a short cast  -Patient was given some Norco.  And was to follow-up with Dr. Shaikh    Today  -She is following up with Dr. Shaikh (orthopedics)  - States that last night was the first night she did not take the Norco, the pain is starting to luz maria  -States swelling is improving.  Denies numbness or tingling of the fingers  - not taking otc ibuprofen, not interested in an NSAID, has a high pain tolerance     Hematuria  -Patient stated she had a little bit of right-sided back pain a few days ago.  Noticed she had some blood in her urine after for a day or so  -Thinks she may have another kidney stone      The following portions of the patient's  history were reviewed and updated as appropriate: allergies, current medications, past family history, past medical history, past social history, past surgical history and problem list.    Patient Active Problem List   Diagnosis   • Hypertension   • Disorder of bursae and tendons in shoulder region   • Chronic headaches   • Arthritis   • Anemia   • H/O splenectomy   • Osteopenia of lumbar spine   • Depressive disorder   • Class 1 obesity due to excess calories with body mass index (BMI) of 32.0 to 32.9 in adult       Current Outpatient Medications on File Prior to Visit   Medication Sig Dispense Refill   • amLODIPine (NORVASC) 5 MG tablet Take 1 tablet by mouth Daily. 90 tablet 3   • aspirin-acetaminophen-caffeine (EXCEDRIN MIGRAINE) 250-250-65 MG per tablet Take 2 tablets by mouth Every 8 (Eight) Hours As Needed for Headache.     • DULoxetine (CYMBALTA) 30 MG capsule TAKE 1 CAPSULE BY MOUTH EVERY DAY *TAKE WITH 60MG DAILY FOR A TOTAL DOSAGE OF 90MG     • HYDROcodone-acetaminophen (NORCO) 5-325 MG per tablet      • lisinopril (PRINIVIL,ZESTRIL) 30 MG tablet Take 1 tablet by mouth Daily. 90 tablet 1   • Misc Natural Products (OSTEO BI-FLEX ADV TRIPLE ST PO) Take  by mouth Daily.     • vitamin D3 125 MCG (5000 UT) capsule capsule Take 125 mcg by mouth.     • [DISCONTINUED] Diclofenac Sodium (Voltaren) 1 % gel gel Apply 4 g topically to the appropriate area as directed 4 (Four) Times a Day As Needed (for left shoulder pain). 100 g 1   • [DISCONTINUED] DULoxetine (CYMBALTA) 60 MG capsule TAKE 1 CAPSULE BY MOUTH EVERY DAY *TAKE WITH 30MG DAILY FOR A TOTAL DOSAGE OF 90MG 90 capsule 2   • [DISCONTINUED] rizatriptan MLT (MAXALT-MLT) 10 MG disintegrating tablet Take one at onset of a headache.  Repeat in two hours if needed. 12 tablet 5     No current facility-administered medications on file prior to visit.     Current outpatient and discharge medications have been reconciled for the patient.  Reviewed by: Janet Mendieta  "DO      Allergies   Allergen Reactions   • Levofloxacin Swelling and Myalgia     Joint swelling         Objective   Visit Vitals  /60 (BP Location: Right arm, Patient Position: Sitting, Cuff Size: Adult)   Pulse 91   Temp 98.9 °F (37.2 °C) (Skin)   Resp 16   Ht 170.2 cm (67\")   Wt 82.2 kg (181 lb 3.2 oz)   SpO2 98%   BMI 28.38 kg/m²       Physical Exam  Constitutional:       Appearance: Normal appearance.   HENT:      Head: Normocephalic.   Eyes:      Conjunctiva/sclera: Conjunctivae normal.   Musculoskeletal:      Comments: - Left arm in cast and shoulder sling   Neurological:      General: No focal deficit present.      Mental Status: She is alert and oriented to person, place, and time.   Psychiatric:         Mood and Affect: Mood normal.         Behavior: Behavior normal.           Diagnoses and all orders for this visit:    1. Closed fracture of left wrist with routine healing, subsequent encounter (Primary)  -Patient is healing very well, is following up with orthopedic surgeon for wrist fracture at [facility]    2. Hematuria, unspecified type  -     POCT urinalysis dipstick, manual: Positive for blood, protein and leukocytes   -Urine sent for culture  -    Due to back pain felt earlier, will treat closer to pyelonephritis  - Cefdinir 300mg BID for 12 days sent in    3. Class 1 obesity due to excess calories without serious comorbidity with body mass index (BMI) of 32.0 to 32.9 in adult  Assessment & Plan:  Patient's (Body mass index is 28.38 kg/m².) indicates that they are obese (BMI >30) with health conditions that include hypertension . Weight is unchanged. BMI  is above average; BMI management plan is completed. We discussed portion control and increasing exercise.              Follow Up  -1 to 2 months for annual wellness exam    Expected course, medications, and adverse effects discussed as appropriate.  Call or return if worsening or persistent symptoms.  I wore protective equipment " throughout this patient encounter to include mask and eye protection. Hand hygiene was performed before donning protective equipment and after removal when leaving the room.    This document is intended for medical expert use only. Reading of this document by patients and/or patient's family without participating medical staff guidance may result in misinterpretation and unintended morbidity. Any interpretation of such data is the responsibility of the patient and/or family member responsible for the patient in concert with their primary or specialist providers, not to be left for sources of online searches such as [employer], [employer] or similar queries. Relying on these approaches to knowledge may result in misinterpretation, misguided goals of care and even death should patients or family members try recommendations outside of the realm of professional medical care.

## 2023-03-02 NOTE — ASSESSMENT & PLAN NOTE
Patient's (Body mass index is 28.38 kg/m².) indicates that they are obese (BMI >30) with health conditions that include hypertension . Weight is unchanged. BMI  is above average; BMI management plan is completed. We discussed portion control and increasing exercise.

## 2023-03-04 LAB
BACTERIA UR CULT: NO GROWTH
BACTERIA UR CULT: NORMAL

## 2023-03-06 ENCOUNTER — TELEPHONE (OUTPATIENT)
Dept: FAMILY MEDICINE CLINIC | Facility: CLINIC | Age: 68
End: 2023-03-06
Payer: MEDICARE

## 2023-03-06 NOTE — TELEPHONE ENCOUNTER
Spoke to pt 03/06/2023, 2:07 pm advised pt of lab results per Dr. Mendieta.    Pt states she is still having back pain.  Advised her I would let you know and call her back.

## 2023-03-06 NOTE — TELEPHONE ENCOUNTER
----- Message from Janet Mendieta DO sent at 3/6/2023  7:52 AM EST -----  Patient's urine culture came back negative.  I still feel that antibiotics considering her UA were appropriate.  She was concerned she may have had a kidney stone moving, that still a possibility.  Let me know if she is having any issues with urination or pain

## 2023-03-07 ENCOUNTER — TELEPHONE (OUTPATIENT)
Dept: FAMILY MEDICINE CLINIC | Facility: CLINIC | Age: 68
End: 2023-03-07
Payer: MEDICARE

## 2023-03-07 ENCOUNTER — APPOINTMENT (OUTPATIENT)
Dept: CT IMAGING | Facility: HOSPITAL | Age: 68
End: 2023-03-07
Payer: MEDICARE

## 2023-03-07 ENCOUNTER — HOSPITAL ENCOUNTER (EMERGENCY)
Facility: HOSPITAL | Age: 68
Discharge: HOME | End: 2023-03-07
Attending: EMERGENCY MEDICINE | Admitting: EMERGENCY MEDICINE
Payer: MEDICARE

## 2023-03-07 VITALS
BODY MASS INDEX: 28.3 KG/M2 | OXYGEN SATURATION: 98 % | WEIGHT: 180.34 LBS | HEIGHT: 67 IN | RESPIRATION RATE: 16 BRPM | DIASTOLIC BLOOD PRESSURE: 64 MMHG | HEART RATE: 54 BPM | SYSTOLIC BLOOD PRESSURE: 112 MMHG | TEMPERATURE: 97.8 F

## 2023-03-07 DIAGNOSIS — R10.9 RIGHT FLANK PAIN: Primary | ICD-10-CM

## 2023-03-07 DIAGNOSIS — N20.0 RENAL STONES: ICD-10-CM

## 2023-03-07 DIAGNOSIS — R11.0 NAUSEA: ICD-10-CM

## 2023-03-07 DIAGNOSIS — Z87.442 HISTORY OF KIDNEY STONES: Primary | ICD-10-CM

## 2023-03-07 DIAGNOSIS — R68.83 CHILLS: ICD-10-CM

## 2023-03-07 DIAGNOSIS — R10.9 FLANK PAIN: ICD-10-CM

## 2023-03-07 LAB
ALBUMIN SERPL-MCNC: 4.2 G/DL (ref 3.5–5.2)
ALBUMIN/GLOB SERPL: 1.4 G/DL
ALP SERPL-CCNC: 182 U/L (ref 39–117)
ALT SERPL W P-5'-P-CCNC: 9 U/L (ref 1–33)
ANION GAP SERPL CALCULATED.3IONS-SCNC: 12 MMOL/L (ref 5–15)
AST SERPL-CCNC: 21 U/L (ref 1–32)
BACTERIA UR QL AUTO: ABNORMAL /HPF
BASOPHILS # BLD AUTO: 0.2 10*3/MM3 (ref 0–0.2)
BASOPHILS NFR BLD AUTO: 2 % (ref 0–1.5)
BILIRUB SERPL-MCNC: 0.5 MG/DL (ref 0–1.2)
BILIRUB UR QL STRIP: NEGATIVE
BUN SERPL-MCNC: 15 MG/DL (ref 8–23)
BUN/CREAT SERPL: 20.8 (ref 7–25)
CALCIUM SPEC-SCNC: 9.7 MG/DL (ref 8.6–10.5)
CHLORIDE SERPL-SCNC: 103 MMOL/L (ref 98–107)
CLARITY UR: CLEAR
CO2 SERPL-SCNC: 24 MMOL/L (ref 22–29)
COD CRY URNS QL: ABNORMAL /HPF
COLOR UR: ABNORMAL
CREAT SERPL-MCNC: 0.72 MG/DL (ref 0.57–1)
D-LACTATE SERPL-SCNC: 0.9 MMOL/L (ref 0.3–2)
DEPRECATED RDW RBC AUTO: 44.2 FL (ref 37–54)
EGFRCR SERPLBLD CKD-EPI 2021: 92.9 ML/MIN/1.73
EOSINOPHIL # BLD AUTO: 0.2 10*3/MM3 (ref 0–0.4)
EOSINOPHIL NFR BLD AUTO: 2.4 % (ref 0.3–6.2)
ERYTHROCYTE [DISTWIDTH] IN BLOOD BY AUTOMATED COUNT: 14 % (ref 12.3–15.4)
GLOBULIN UR ELPH-MCNC: 3 GM/DL
GLUCOSE SERPL-MCNC: 96 MG/DL (ref 65–99)
GLUCOSE UR STRIP-MCNC: NEGATIVE MG/DL
HCT VFR BLD AUTO: 45.8 % (ref 34–46.6)
HGB BLD-MCNC: 15.1 G/DL (ref 12–15.9)
HGB UR QL STRIP.AUTO: NEGATIVE
HOLD SPECIMEN: NORMAL
HOLD SPECIMEN: NORMAL
HYALINE CASTS UR QL AUTO: ABNORMAL /LPF
KETONES UR QL STRIP: ABNORMAL
LEUKOCYTE ESTERASE UR QL STRIP.AUTO: ABNORMAL
LIPASE SERPL-CCNC: 30 U/L (ref 13–60)
LYMPHOCYTES # BLD AUTO: 3 10*3/MM3 (ref 0.7–3.1)
LYMPHOCYTES NFR BLD AUTO: 34 % (ref 19.6–45.3)
MCH RBC QN AUTO: 29.9 PG (ref 26.6–33)
MCHC RBC AUTO-ENTMCNC: 33 G/DL (ref 31.5–35.7)
MCV RBC AUTO: 90.5 FL (ref 79–97)
MONOCYTES # BLD AUTO: 0.6 10*3/MM3 (ref 0.1–0.9)
MONOCYTES NFR BLD AUTO: 6.3 % (ref 5–12)
MUCOUS THREADS URNS QL MICRO: ABNORMAL /HPF
NEUTROPHILS NFR BLD AUTO: 5 10*3/MM3 (ref 1.7–7)
NEUTROPHILS NFR BLD AUTO: 55.3 % (ref 42.7–76)
NITRITE UR QL STRIP: NEGATIVE
NRBC BLD AUTO-RTO: 0 /100 WBC (ref 0–0.2)
PH UR STRIP.AUTO: 5.5 [PH] (ref 5–8)
PLATELET # BLD AUTO: 412 10*3/MM3 (ref 140–450)
PMV BLD AUTO: 8.5 FL (ref 6–12)
POTASSIUM SERPL-SCNC: 4.6 MMOL/L (ref 3.5–5.2)
PROT SERPL-MCNC: 7.2 G/DL (ref 6–8.5)
PROT UR QL STRIP: ABNORMAL
RBC # BLD AUTO: 5.06 10*6/MM3 (ref 3.77–5.28)
RBC # UR STRIP: ABNORMAL /HPF
REF LAB TEST METHOD: ABNORMAL
RENAL EPI CELLS #/AREA URNS HPF: ABNORMAL /HPF
SODIUM SERPL-SCNC: 139 MMOL/L (ref 136–145)
SP GR UR STRIP: 1.02 (ref 1–1.03)
SQUAMOUS #/AREA URNS HPF: ABNORMAL /HPF
TRANS CELLS #/AREA URNS HPF: ABNORMAL /HPF
UROBILINOGEN UR QL STRIP: ABNORMAL
WBC # UR STRIP: ABNORMAL /HPF
WBC NRBC COR # BLD: 9 10*3/MM3 (ref 3.4–10.8)
WHOLE BLOOD HOLD COAG: NORMAL
WHOLE BLOOD HOLD SPECIMEN: NORMAL

## 2023-03-07 PROCEDURE — 83690 ASSAY OF LIPASE: CPT | Performed by: PHYSICIAN ASSISTANT

## 2023-03-07 PROCEDURE — 99283 EMERGENCY DEPT VISIT LOW MDM: CPT

## 2023-03-07 PROCEDURE — 74176 CT ABD & PELVIS W/O CONTRAST: CPT

## 2023-03-07 PROCEDURE — 83605 ASSAY OF LACTIC ACID: CPT

## 2023-03-07 PROCEDURE — 25010000002 KETOROLAC TROMETHAMINE PER 15 MG: Performed by: PHYSICIAN ASSISTANT

## 2023-03-07 PROCEDURE — 81001 URINALYSIS AUTO W/SCOPE: CPT | Performed by: PHYSICIAN ASSISTANT

## 2023-03-07 PROCEDURE — 96374 THER/PROPH/DIAG INJ IV PUSH: CPT

## 2023-03-07 PROCEDURE — 87086 URINE CULTURE/COLONY COUNT: CPT | Performed by: PHYSICIAN ASSISTANT

## 2023-03-07 PROCEDURE — 85025 COMPLETE CBC W/AUTO DIFF WBC: CPT | Performed by: PHYSICIAN ASSISTANT

## 2023-03-07 PROCEDURE — 25010000002 ONDANSETRON PER 1 MG: Performed by: PHYSICIAN ASSISTANT

## 2023-03-07 PROCEDURE — 80053 COMPREHEN METABOLIC PANEL: CPT | Performed by: PHYSICIAN ASSISTANT

## 2023-03-07 PROCEDURE — 96375 TX/PRO/DX INJ NEW DRUG ADDON: CPT

## 2023-03-07 RX ORDER — ONDANSETRON 2 MG/ML
4 INJECTION INTRAMUSCULAR; INTRAVENOUS ONCE
Status: COMPLETED | OUTPATIENT
Start: 2023-03-07 | End: 2023-03-07

## 2023-03-07 RX ORDER — KETOROLAC TROMETHAMINE 15 MG/ML
15 INJECTION, SOLUTION INTRAMUSCULAR; INTRAVENOUS ONCE
Status: COMPLETED | OUTPATIENT
Start: 2023-03-07 | End: 2023-03-07

## 2023-03-07 RX ORDER — ONDANSETRON 4 MG/1
4 TABLET, ORALLY DISINTEGRATING ORAL EVERY 6 HOURS PRN
Qty: 30 TABLET | Refills: 0 | Status: SHIPPED | OUTPATIENT
Start: 2023-03-07 | End: 2023-04-04

## 2023-03-07 RX ORDER — TAMSULOSIN HYDROCHLORIDE 0.4 MG/1
1 CAPSULE ORAL DAILY
Qty: 10 CAPSULE | Refills: 0 | Status: SHIPPED | OUTPATIENT
Start: 2023-03-07 | End: 2023-03-17

## 2023-03-07 RX ORDER — SODIUM CHLORIDE 0.9 % (FLUSH) 0.9 %
10 SYRINGE (ML) INJECTION AS NEEDED
Status: DISCONTINUED | OUTPATIENT
Start: 2023-03-07 | End: 2023-03-08 | Stop reason: HOSPADM

## 2023-03-07 RX ADMIN — KETOROLAC TROMETHAMINE 15 MG: 15 INJECTION, SOLUTION INTRAMUSCULAR; INTRAVENOUS at 19:38

## 2023-03-07 RX ADMIN — ONDANSETRON 4 MG: 2 INJECTION INTRAMUSCULAR; INTRAVENOUS at 19:39

## 2023-03-07 NOTE — TELEPHONE ENCOUNTER
PATIENT STATES SHE SPOKE WITH DR. MCCONNELL NURSE YESTERDAY REGARDING HER LAB RESULTS, AND SHE LET HER KNOW SHE WAS STILL HAVING BACK PAIN THE NURSE ADVISED PATIENT SHE WOULD LET DR. BECKMAN KNOW SHE AND THEN CALL HER BACK... HOWEVER PATIENT NEVER RECEIVED A CALL BACK.. PLEASE CALL PATIENT      PATIENT> [phone]

## 2023-03-07 NOTE — TELEPHONE ENCOUNTER
Ordering stat CT stone protocol to rule out stone and stat CBC to rule out pyelonephritis/developing sepsis.  Sending the patient to [facility] in [place]

## 2023-03-08 NOTE — DISCHARGE INSTRUCTIONS
Please take your already prescribed Norco and ibuprofen as needed for pain control.  Please take Zofran as needed for nausea and vomiting.  Please follow-up with Dr. Ryan with urology in 3 days regarding kidney stone.  Please come back to the ER if you have profuse vomiting, spike a high fever or have severe pain as you will need reevaluation that time.

## 2023-03-08 NOTE — ED PROVIDER NOTES
Subjective       Patient is a 67-year-old female comes in complaining of right-sided flank pain for the past 2 weeks.  Patient states that this began as intermittent and had associated pain with urination and urinary frequency.  Patient was seen by primary care provider about 5 days ago and was found to have UTI at that time and has been on Omnicef for the past 5 days.  Patient states past few days her symptoms have worsened.  Patient states pain is about a 6 out of 10 on the right flank that is nonradiating and nothing seems to make it better or worse.  Patient has associated nausea but no vomiting.  Patient does report history of kidney stones in the past and follows with urology, Dr. Ryan.  Patient denies any diarrhea, fever, chills, cough or congestion.  Patient had called her primary care provider who referred her to the ER for possible kidney stone work-up versus sepsis.      Review of Systems   Constitutional: Negative for chills, fatigue and fever.   HENT: Negative for congestion, sore throat, tinnitus and trouble swallowing.    Eyes: Negative for photophobia, discharge and visual disturbance.   Respiratory: Negative for cough and shortness of breath.    Cardiovascular: Negative for chest pain and leg swelling.   Gastrointestinal: Positive for nausea. Negative for abdominal pain, blood in stool, diarrhea and vomiting.   Genitourinary: Positive for dysuria, flank pain and frequency. Negative for urgency.   Musculoskeletal: Negative for arthralgias and myalgias.   Skin: Negative for rash.   Neurological: Negative for dizziness and headaches.   Psychiatric/Behavioral: Negative for confusion.       Past Medical History:   Diagnosis Date   • Allergic    • Anxiety    • Arthritis    • Deep vein thrombosis (HCC)    • Depression    • Headache    • Presence of left artificial knee joint        Allergies   Allergen Reactions   • Levofloxacin Swelling and Myalgia     Joint swelling       Past Surgical History:    Procedure Laterality Date   • JOINT REPLACEMENT  2019   • ROTATOR CUFF REPAIR Right 2011    x2   • SINUS SURGERY Bilateral 2000   • SPLENECTOMY     • TUBAL ABDOMINAL LIGATION         Family History   Problem Relation Age of Onset   • Cancer Father        Social History     Socioeconomic History   • Marital status:    Tobacco Use   • Smoking status: Never     Passive exposure: Past   • Smokeless tobacco: Never   Vaping Use   • Vaping Use: Never used   Substance and Sexual Activity   • Alcohol use: Never   • Drug use: Never   • Sexual activity: Not Currently     Partners: Male     Birth control/protection: Post-menopausal           Objective   Physical Exam  Vitals and nursing note reviewed.   Constitutional:       General: She is not in acute distress.     Appearance: She is well-developed. She is not diaphoretic.   HENT:      Head: Normocephalic and atraumatic.      Right Ear: External ear normal.      Left Ear: External ear normal.      Nose: Nose normal.      Mouth/Throat:      Mouth: Mucous membranes are moist.   Eyes:      Extraocular Movements: Extraocular movements intact.      Conjunctiva/sclera: Conjunctivae normal.      Pupils: Pupils are equal, round, and reactive to light.   Cardiovascular:      Rate and Rhythm: Normal rate and regular rhythm.      Heart sounds: Normal heart sounds.      Comments: S1, S2 audible.  Pulmonary:      Effort: Pulmonary effort is normal. No respiratory distress.      Breath sounds: Normal breath sounds. No wheezing, rhonchi or rales.      Comments: On room air.  Abdominal:      General: Bowel sounds are normal. There is no distension.      Palpations: Abdomen is soft.      Tenderness: There is no abdominal tenderness. There is right CVA tenderness. There is no left CVA tenderness or guarding.   Musculoskeletal:         General: No tenderness or deformity. Normal range of motion.      Cervical back: Normal range of motion.   Skin:     General: Skin is warm.       "Capillary Refill: Capillary refill takes less than 2 seconds.      Findings: No erythema or rash.   Neurological:      Mental Status: She is alert and oriented to person, place, and time.      Cranial Nerves: No cranial nerve deficit.   Psychiatric:         Mood and Affect: Mood normal.         Behavior: Behavior normal.         Procedures           ED Course  ED Course as of 03/07/23 2307 Tue Mar 07, 2023   2102 Await[address] ct [RL]      ED Course User Index  [RL] Darrel Mcgraw PA      /64   Pulse 54   Temp 97.8 °F (36.6 °C) (Temporal)   Resp 16   Ht 170.2 cm (67\")   Wt 81.8 kg (180 lb 5.4 oz)   SpO2 98%   BMI 28.24 kg/m²   Labs Reviewed   COMPREHENSIVE METABOLIC PANEL - Abnormal; Notable for the following components:       Result Value    Alkaline Phosphatase 182 (*)     All other components within normal limits    Narrative:     GFR Normal >60  Chronic Kidney Disease <60  Kidney Failure <15     URINALYSIS W/ CULTURE IF INDICATED - Abnormal; Notable for the following components:    Color, UA Dark Yellow (*)     Ketones, UA Trace (*)     Protein, UA Trace (*)     Leuk Esterase, UA Small (1+) (*)     All other components within normal limits    Narrative:     In absence of clinical symptoms, the presence of pyuria, bacteria, and/or nitrites on the urinalysis result does not correlate with infection.   CBC WITH AUTO DIFFERENTIAL - Abnormal; Notable for the following components:    Basophil % 2.0 (*)     All other components within normal limits   URINALYSIS, MICROSCOPIC ONLY - Abnormal; Notable for the following components:    RBC, UA 0-2 (*)     WBC, UA 6-12 (*)     Squamous Epithelial Cells, UA 3-6 (*)     Mucus, UA Moderate/2+ (*)     All other components within normal limits   LIPASE - Normal   POC LACTATE - Normal   URINE CULTURE   RAINBOW DRAW    Narrative:     The following orders were created for panel order [place] Draw.  Procedure                               Abnormality         Status             "         ---------                               -----------         ------                     Green Top (Gel)[[id number]]                                  Final result               Lavender Top[[id number]]                                     Final result               Gold Top - SST[[id number]]                                   Final result               Light Blue Top[[id number]]                                   Final result                 Please view results for these tests on the individual orders.   POC LACTATE   GREEN TOP   LAVENDER TOP   GOLD TOP - SST   LIGHT BLUE TOP   CBC AND DIFFERENTIAL    Narrative:     The following orders were created for panel order CBC & Differential.  Procedure                               Abnormality         Status                     ---------                               -----------         ------                     CBC Auto Differential[[id number]]        Abnormal            Final result                 Please view results for these tests on the individual orders.     CT Abdomen Pelvis Without Contrast    Result Date: 3/7/2023  1.Multiple nonobstructing right renal stones largest measuring 5 mm. 2.Unchanged 1.6 cm indeterminate left adrenal gland nodule. Nonemergent MRI can be performed for further evaluation. 3.Urinary bladder wall thickening may represent underdistention, cystitis or other etiologies. Please correlate with urinalysis. Follow-up recommended. 4.Gastric wall thickening may represent gastritis or other etiologies. Distal esophageal wall thickening may represent esophagitis or other etiologies. Small hiatal hernia. Follow-up recommended. Electronically Signed: Agus Peraza  3/7/2023 9:20 PM EST  Workstation ID: [id number]                                         MDM     Differential diagnosis: pyelonephritis, ureterolithiasis, not meant to be an all-inclusive list  Chart review:  last primary care visit on 3/2/2023 with Dr. Mendieta showed patient was having hematuria  "and right-sided back pain for the past few days and noticed some blood in the urine and was concerned for possible kidney stone.  Urine was checked and was positive for blood, proteins and leukocytes and urine was sent for culture.  Patient was treated for possible pyelonephritis with cefdinir for 12 days.    EKG:  Not indicated  Imaging: CT abdomen pelvis without contrast shows multiple nonobstructing right renal stones largest measuring 5 mm.  Unchanged 1.6 cm indeterminate left adrenal gland nodule.  Nonemergent MRI can be performed for further evaluation.  Urinary bladder wall thickening may represent underdistention, cystitis or other etiologies. Gastritis.    Labs: UA shows 1+ leukocyte esterase and 6-12 WBCs.  Lipase normal.  Initial lactic normal.  Urine culture pending.  CBC and CMP largely unremarkable for acute findings.  Vitals:  /64   Pulse 54   Temp 97.8 °F (36.6 °C) (Temporal)   Resp 16   Ht 170.2 cm (67\")   Wt 81.8 kg (180 lb 5.4 oz)   SpO2 98%   BMI 28.24 kg/m²     Medications given:    Medications   sodium chloride 0.9 % flush 10 mL (has no administration in time range)   ondansetron (ZOFRAN) injection 4 mg (4 mg Intravenous Given 3/7/23 1939)   ketorolac (TORADOL) injection 15 mg (15 mg Intravenous Given 3/7/23 1938)       Procedures:  Not indicated  MDM: Patient is a 67-year-old female comes in complaining of right flank pain and urinary symptoms.  Patient's pain was sudden and severe.  Last primary care visit on 3/2/2023 with Dr. Mendieta showed patient was having hematuria and right-sided back pain for the past few days and noticed some blood in the urine and was concerned for possible kidney stone.  Urine was checked and was positive for blood, proteins and leukocytes and urine was sent for culture.  Patient was treated for possible pyelonephritis with cefdinir for 12 days.  On today's evaluation, IV established. UA shows 1+ leukocyte esterase and 6-12 WBCs.  Lipase normal.  Initial " lactic normal.  Urine culture pending.  CBC and CMP largely unremarkable for acute findings.  CT abdomen pelvis without contrast independently interpreted by myself shows right kidney stone at the level of the kidney, no perinephric stranding obvious, official shows multiple nonobstructing right renal stones largest measuring 5 mm.  Unchanged 1.6 cm indeterminate left adrenal gland nodule.  Nonemergent MRI can be performed for further evaluation.  Urinary bladder wall thickening may represent underdistention, cystitis or other etiologies. Gastritis.  Patient was given Toradol and Zofran with significantly improving pain.  Patient was not in any acute distress on reevaluation and patient's heart rate was in the 60s and afebrile here.  Patient not triggering any SIRS criteria.  Patient instructed to continue Omnicef until she completes this for the full 12-day course.  Patient is already prescribed Norco for extremity pain.  Patient was prescribed Zofran and Flomax and instructed to follow-up with urology in 3 days and given strict return precautions and voiced understanding.  Patient was offered admission for further pain control but declined.  See full discharge instructions for further details.  Results and plan discussed with patient and is agreeable with plan.    Final diagnoses:   Right flank pain   Renal stones   Nausea       ED Disposition  ED Disposition     ED Disposition   Discharge    Condition   Stable    Comment   --             [facility] EMERGENCY DEPARTMENT  [address] [address] [phone]  Go in 1 day  As needed, If symptoms worsen    Janet Mendieta, DO  [address] [place] [address] [address]  [address]  [place] [id number] [phone]    Call in 1 week  As needed    Heath Ryan MD  [address] [place] [address] [place] [address] [phone]    Schedule an appointment as soon as possible for a visit in 3 days           Medication List      New Prescriptions    ondansetron  ODT 4 MG disintegrating tablet  Commonly known as: ZOFRAN-ODT  Place 1 tablet on the tongue Every 6 (Six) Hours As Needed for Nausea or Vomiting.     tamsulosin 0.4 MG capsule 24 hr capsule  Commonly known as: FLOMAX  Take 1 capsule by mouth Daily for 10 days.           Where to Get Your Medications      These medications were sent to [facility]. - [place], [place] - [address] - [phone]  - [phone]  [address] [facility] [place] [address]    Phone: [phone]   · ondansetron ODT 4 MG disintegrating tablet  · tamsulosin 0.4 MG capsule 24 hr capsule          Darrel Mcgraw PA  03/07/23 6481

## 2023-03-09 LAB — BACTERIA SPEC AEROBE CULT: NO GROWTH

## 2023-03-21 PROCEDURE — 82365 CALCULUS SPECTROSCOPY: CPT | Performed by: UROLOGY

## 2023-03-22 ENCOUNTER — LAB REQUISITION (OUTPATIENT)
Dept: LAB | Facility: HOSPITAL | Age: 68
End: 2023-03-22
Payer: MEDICARE

## 2023-03-22 DIAGNOSIS — N20.0 CALCULUS OF KIDNEY: ICD-10-CM

## 2023-03-28 LAB
CALCIUM OXALATE DIHYDRATE MFR STONE IR: 30 %
COLOR STONE: NORMAL
COM MFR STONE: 70 %
COMPN STONE: NORMAL
LABORATORY COMMENT REPORT: NORMAL
Lab: NORMAL
Lab: NORMAL
PHOTO: NORMAL
SIZE STONE: NORMAL MM
SPEC SOURCE SUBJ: NORMAL
WT STONE: 6 MG

## 2023-04-04 ENCOUNTER — OFFICE VISIT (OUTPATIENT)
Dept: FAMILY MEDICINE CLINIC | Facility: CLINIC | Age: 68
End: 2023-04-04
Payer: MEDICARE

## 2023-04-04 ENCOUNTER — HOSPITAL ENCOUNTER (OUTPATIENT)
Dept: GENERAL RADIOLOGY | Facility: HOSPITAL | Age: 68
Discharge: HOME | End: 2023-04-04
Admitting: STUDENT IN AN ORGANIZED HEALTH CARE EDUCATION/TRAINING PROGRAM
Payer: MEDICARE

## 2023-04-04 VITALS
BODY MASS INDEX: 28.03 KG/M2 | HEIGHT: 67 IN | TEMPERATURE: 97.3 F | RESPIRATION RATE: 18 BRPM | SYSTOLIC BLOOD PRESSURE: 112 MMHG | OXYGEN SATURATION: 98 % | HEART RATE: 62 BPM | DIASTOLIC BLOOD PRESSURE: 68 MMHG | WEIGHT: 178.6 LBS

## 2023-04-04 DIAGNOSIS — Z13.1 DIABETES MELLITUS SCREENING: ICD-10-CM

## 2023-04-04 DIAGNOSIS — Z00.00 MEDICARE ANNUAL WELLNESS VISIT, INITIAL: Primary | ICD-10-CM

## 2023-04-04 DIAGNOSIS — G89.29 CHRONIC BILATERAL LOW BACK PAIN WITHOUT SCIATICA: ICD-10-CM

## 2023-04-04 DIAGNOSIS — M54.50 CHRONIC BILATERAL LOW BACK PAIN WITHOUT SCIATICA: ICD-10-CM

## 2023-04-04 DIAGNOSIS — R89.9 ABNORMAL LABORATORY TEST: ICD-10-CM

## 2023-04-04 DIAGNOSIS — R79.9 ABNORMAL FINDING OF BLOOD CHEMISTRY, UNSPECIFIED: ICD-10-CM

## 2023-04-04 DIAGNOSIS — Z13.29 THYROID DISORDER SCREEN: ICD-10-CM

## 2023-04-04 DIAGNOSIS — E55.9 VITAMIN D DEFICIENCY: ICD-10-CM

## 2023-04-04 DIAGNOSIS — I10 PRIMARY HYPERTENSION: ICD-10-CM

## 2023-04-04 DIAGNOSIS — G89.29 CHRONIC LEFT SHOULDER PAIN: ICD-10-CM

## 2023-04-04 DIAGNOSIS — Z13.220 SCREENING CHOLESTEROL LEVEL: ICD-10-CM

## 2023-04-04 DIAGNOSIS — M25.512 CHRONIC LEFT SHOULDER PAIN: ICD-10-CM

## 2023-04-04 DIAGNOSIS — E66.09 CLASS 1 OBESITY DUE TO EXCESS CALORIES WITHOUT SERIOUS COMORBIDITY WITH BODY MASS INDEX (BMI) OF 32.0 TO 32.9 IN ADULT: ICD-10-CM

## 2023-04-04 PROCEDURE — 72100 X-RAY EXAM L-S SPINE 2/3 VWS: CPT

## 2023-04-04 NOTE — PROGRESS NOTES
The ABCs of the Annual Wellness Visit  [place] to Medicare Visit    Subjective     Adela Dowell is a 67 y.o. female who presents for a  Welcome to Medicare Visit.    The following portions of the patient's history were reviewed and   updated as appropriate: allergies, current medications, past family history, past medical history, past social history, past surgical history and problem list.     Compared to one year ago, the patient feels her physical   health is worse.  - due to kidney stones (had 3 removed with stent placement for 1 week) and arm break  - will see urology in a few weeks for follow up. Told her to keep drinking fluids, cutting out caffeine    Compared to one year ago, the patient feels her mental   health is better.    Recent Hospitalizations:  She was not admitted to the hospital during the last year.       Current Medical Providers:  Patient Care Team:  Janet Mendieta DO as PCP - General (Family Medicine)  La Nena Dillard MD Tillett, Edward Dail, MD as Surgeon (Orthopedic Surgery)  Mannie Godinez MD as Consulting Physician (Family Medicine)  Heath Ryan MD as Consulting Physician (Urology)    Outpatient Medications Prior to Visit   Medication Sig Dispense Refill   • amLODIPine (NORVASC) 5 MG tablet Take 1 tablet by mouth Daily. 90 tablet 3   • aspirin-acetaminophen-caffeine (EXCEDRIN MIGRAINE) 250-250-65 MG per tablet Take 2 tablets by mouth Every 8 (Eight) Hours As Needed for Headache.     • DULoxetine (CYMBALTA) 30 MG capsule 1 capsule. Pt taking 1 tablet every other day per Dr. De Jesus (weaning off medication)     • lisinopril (PRINIVIL,ZESTRIL) 30 MG tablet Take 1 tablet by mouth Daily. 90 tablet 1   • Misc Natural Products (OSTEO BI-FLEX ADV TRIPLE ST PO) Take  by mouth Daily.     • vitamin D3 125 MCG (5000 UT) capsule capsule Take 125 mcg by mouth.     • HYDROcodone-acetaminophen (NORCO) 5-325 MG per tablet      • ondansetron ODT (ZOFRAN-ODT) 4 MG disintegrating  "tablet Place 1 tablet on the tongue Every 6 (Six) Hours As Needed for Nausea or Vomiting. 30 tablet 0     No facility-administered medications prior to visit.       No opioid medication identified on active medication list. I have reviewed chart for other potential  high risk medication/s and harmful drug interactions in the elderly.          Aspirin is not on active medication list.  Aspirin use is not indicated based on review of current medical condition/s. Risk of harm outweighs potential benefits.  .    Patient Active Problem List   Diagnosis   • Hypertension   • Disorder of bursae and tendons in shoulder region   • Chronic headaches   • Arthritis   • Anemia   • H/O splenectomy   • Osteopenia of lumbar spine   • Depressive disorder   • Class 1 obesity due to excess calories with body mass index (BMI) of 32.0 to 32.9 in adult   • History of kidney stones     Advance Care Planning  Advance Directive is not on file.  ACP discussion was held with the patient during this visit. Patient does not have an advance directive, information provided.. Spent 16 minutes in discussion with pt on ACP       Objective   Vitals:    04/04/23 1127   BP: 112/68   BP Location: Right arm   Patient Position: Sitting   Cuff Size: Adult   Pulse: 62   Resp: 18   Temp: 97.3 °F (36.3 °C)   TempSrc: Skin   SpO2: 98%   Weight: 81 kg (178 lb 9.6 oz)   Height: 170.2 cm (67\")     Estimated body mass index is 27.97 kg/m² as calculated from the following:    Height as of this encounter: 170.2 cm (67\").    Weight as of this encounter: 81 kg (178 lb 9.6 oz).    BMI is >= 25 and <30. (Overweight) The following options were offered after discussion;: weight loss educational material (shared in after visit summary) and exercise counseling/recommendations      Does the patient have evidence of cognitive impairment?   No         Procedures       HEALTH RISK ASSESSMENT    Smoking Status:  Social History     Tobacco Use   Smoking Status Never   • Passive " exposure: Past   Smokeless Tobacco Never     Alcohol Consumption:  Social History     Substance and Sexual Activity   Alcohol Use Never       Fall Risk Screen:    STEADI Fall Risk Assessment was completed, and patient is at LOW risk for falls.Assessment completed on:4/4/2023    Depression Screen:   PHQ-2/PHQ-9 Depression Screening 4/4/2023   Little Interest or Pleasure in Doing Things 0-->not at all   Feeling Down, Depressed or Hopeless 0-->not at all   Trouble Falling or Staying Asleep, or Sleeping Too Much 0-->not at all   Feeling Tired or Having Little Energy 0-->not at all   Poor Appetite or Overeating 0-->not at all   Feeling Bad about Yourself - or that You are a Failure or Have Let Yourself or Your Family Down 0-->not at all   Trouble Concentrating on Things, Such as Reading the Newspaper or Watching Television 0-->not at all   Moving or Speaking So Slowly that Other People Could Have Noticed? Or the Opposite - Being So Fidgety 0-->not at all   Thoughts that You Would be Better Off Dead or of Hurting Yourself in Some Way 0-->not at all   PHQ-9: Brief Depression Severity Measure Score 0   If You Checked Off Any Problems, How Difficult Have These Problems Made It For You to Do Your Work, Take Care of Things at Home, or Get Along with Other People? not difficult at all       Health Habits and Functional and Cognitive Screening:  Functional & Cognitive Status 4/4/2023   Do you have difficulty preparing food and eating? No   Do you have difficulty bathing yourself, getting dressed or grooming yourself? No   Do you have difficulty using the toilet? No   Do you have difficulty moving around from place to place? No   Do you have trouble with steps or getting out of a bed or a chair? No   Current Diet Well Balanced Diet   Dental Exam Up to date        Dental Exam Comment Dr. May   Eye Exam Up to date        Eye Exam Comment Dr. Hernandez   Exercise (times per week) 7 times per week   Current Exercises Include Other         Exercise Comment farming   Do you need help using the phone?  No   Are you deaf or do you have serious difficulty hearing?  No   Do you need help with transportation? No   Do you need help shopping? No   Do you need help preparing meals?  No   Do you need help with housework?  No   Do you need help with laundry? No   Do you need help taking your medications? No   Do you need help managing money? No   Do you ever drive or ride in a car without wearing a seat belt? No   Have you felt unusual stress, anger or loneliness in the last month? Yes   Who do you live with? Spouse   If you need help, do you have trouble finding someone available to you? No   Have you been bothered in the last four weeks by sexual problems? No   Do you have difficulty concentrating, remembering or making decisions? No       Visual Acuity:    No results found.    Age-appropriate Screening Schedule:  Refer to the list below for future screening recommendations based on patient's age, sex and/or medical conditions. Orders for these recommended tests are listed in the plan section. The patient has been provided with a written plan.    Health Maintenance   Topic Date Due   • PAP SMEAR  09/23/2022   • DXA SCAN  05/17/2023   • INFLUENZA VACCINE  08/01/2023   • MAMMOGRAM  01/03/2024   • ANNUAL WELLNESS VISIT  04/04/2024   • Pneumococcal Vaccine 65+ (3) 09/23/2024   • COLORECTAL CANCER SCREENING  05/01/2029   • TDAP/TD VACCINES (5 - Td or Tdap) 09/23/2029   • HEPATITIS C SCREENING  Completed   • COVID-19 Vaccine  Completed   • ZOSTER VACCINE  Completed        CMS Preventative Services Quick Reference  Risk Factors Identified During Encounter    Depression/Dysphoria: Patient has had successful treatment with her psychiatrist and is [facility] treatments.  Patient is actually weaning off Cymbalta    The above risks/problems have been discussed with the patient.  Pertinent information has been shared with the patient in the After Visit Summary.        Assessment and Plan   Diagnoses and all orders for this visit:    1. Medicare annual wellness visit, initial (Primary)  -Patient was given ACP to fill out at home.  She will drop it off the  whenever she has it filled out  -Pertinent labs have been ordered    2. Primary hypertension  -Patient currently takes lisinopril 30 mg and amlodipine 5 mg.  Asked her to stop the 5 mg of amlodipine  -Patient was given a blood pressure check sheet for her to fill out while she is at home and nurse visit in 2 weeks to check up on her blood pressure (asked her to bring her cuff in)    3. Chronic bilateral low back pain without sciatica/chronic left shoulder pain  -     XR Spine Lumbar 2 or 3 View; Future to evaluate spine as she has not had imaging of this area before  -     Ambulatory Referral to Physical Therapy Evaluate and treat: Order was printed, signed and handed to patient to go to the facility of her choice    5. Screening cholesterol level  -     Lipid Panel With / Chol / HDL Ratio    6. Abnormal laboratory test  -     Comprehensive Metabolic Panel    7. Diabetes mellitus screening  -     Hemoglobin A1c    8. Vitamin D deficiency  -     Vitamin D,25-Hydroxy    9. Class 1 obesity due to excess calories without serious comorbidity with body mass index (BMI) of 32.0 to 32.9 in adult  Assessment & Plan:  Patient's (Body mass index is 27.97 kg/m².) indicates that they are obese (BMI >30) with health conditions that include hypertension . Weight is unchanged. BMI  is above average; BMI management plan is completed. We discussed portion control and increasing exercise.       10. Thyroid disorder screen  -     TSH Rfx On Abnormal To Free T4    11. Abnormal finding of blood chemistry, unspecified  -     Hemoglobin A1c        Follow Up   -1 year for annual wellness exam  -2 weeks for nurse visit for BP check    Patient was given instructions and counseling regarding her condition or for health maintenance advice.  Please see specific information pulled into the AVS if appropriate.

## 2023-04-04 NOTE — PROGRESS NOTES
Chronic low back pain/chronic left shoulder pain  -Patient has been having chronic low back pain for several years and would like to know if there is anything she can do about it  -Patient is also concerned about chronic left shoulder pain that is further and further limited in movement over time  -Patient asked if there is some kind of exercise that she is able to do      Hypertension  - Dr De Jesus has worked with patient on transcranial magnetic stimulation therapy successfully for treatment of anxiety depression.  Patient has reduced cymbalta to 1 every other day of 30mg and weaning off  -She was told that her blood pressure was elevated due to the Cymbalta and patient has noticed this as well  -Patient currently takes lisinopril 30 mg and amlodipine 5 mg.  She is wanting to know if she can decrease on her medication

## 2023-04-04 NOTE — ASSESSMENT & PLAN NOTE
Patient's (Body mass index is 27.97 kg/m².) indicates that they are obese (BMI >30) with health conditions that include hypertension . Weight is unchanged. BMI  is above average; BMI management plan is completed. We discussed portion control and increasing exercise.

## 2023-04-05 LAB
25(OH)D3+25(OH)D2 SERPL-MCNC: 93.1 NG/ML (ref 30–100)
ALBUMIN SERPL-MCNC: 4.5 G/DL (ref 3.8–4.8)
ALBUMIN/GLOB SERPL: 2.1 {RATIO} (ref 1.2–2.2)
ALP SERPL-CCNC: 163 IU/L (ref 44–121)
ALT SERPL-CCNC: 7 IU/L (ref 0–32)
AST SERPL-CCNC: 18 IU/L (ref 0–40)
BILIRUB SERPL-MCNC: 0.7 MG/DL (ref 0–1.2)
BUN SERPL-MCNC: 16 MG/DL (ref 8–27)
BUN/CREAT SERPL: 21 (ref 12–28)
CALCIUM SERPL-MCNC: 9.8 MG/DL (ref 8.7–10.3)
CHLORIDE SERPL-SCNC: 104 MMOL/L (ref 96–106)
CHOLEST SERPL-MCNC: 196 MG/DL (ref 100–199)
CHOLEST/HDLC SERPL: 3.6 RATIO (ref 0–4.4)
CO2 SERPL-SCNC: 21 MMOL/L (ref 20–29)
CREAT SERPL-MCNC: 0.75 MG/DL (ref 0.57–1)
EGFRCR SERPLBLD CKD-EPI 2021: 88 ML/MIN/1.73
GLOBULIN SER CALC-MCNC: 2.1 G/DL (ref 1.5–4.5)
GLUCOSE SERPL-MCNC: 100 MG/DL (ref 70–99)
HBA1C MFR BLD: 5.6 % (ref 4.8–5.6)
HDLC SERPL-MCNC: 54 MG/DL
LDLC SERPL CALC-MCNC: 127 MG/DL (ref 0–99)
POTASSIUM SERPL-SCNC: 4.8 MMOL/L (ref 3.5–5.2)
PROT SERPL-MCNC: 6.6 G/DL (ref 6–8.5)
SODIUM SERPL-SCNC: 139 MMOL/L (ref 134–144)
TRIGL SERPL-MCNC: 84 MG/DL (ref 0–149)
TSH SERPL DL<=0.005 MIU/L-ACNC: 1.45 UIU/ML (ref 0.45–4.5)
VLDLC SERPL CALC-MCNC: 15 MG/DL (ref 5–40)

## 2023-04-06 ENCOUNTER — TELEPHONE (OUTPATIENT)
Dept: FAMILY MEDICINE CLINIC | Facility: CLINIC | Age: 68
End: 2023-04-06
Payer: MEDICARE

## 2023-04-06 NOTE — TELEPHONE ENCOUNTER
----- Message from Janet Mendieta DO sent at 4/6/2023  2:07 PM EDT -----  Patient's electrolytes, kidney and liver enzymes are normal..  Her alkaline phosphatase is little bit up but nothing to be concerned about at this point.  Her cholesterol is roughly the same as it was last year but her HDL has come down just slightly and her LDL has come up.  Increasing regular physical activity and decreasing dietary cholesterol can be helpful for this    Her thyroid screen is normal.  Her vitamin D screen is normal.  Patient's hemoglobin A1c was in the normal limits, she is close to the prediabetic range but physical activity and watching her diet will be helpful to prevent that

## 2023-04-06 NOTE — TELEPHONE ENCOUNTER
----- Message from Janet Mendieta DO sent at 4/6/2023  2:14 PM EDT -----  Patient's low back x-ray showed that she has a slight dextroscoliosis of the spine which may make her more prone to getting back pains.  There is possibly some narrowing of the disc space between L1-L2 and L5-S1 and some arthritis in the lower lumbar spine.  We had put in orders for physical therapy which would still be appropriate.  Please have her come in if she is not noticing improvement after physical therapy

## 2023-04-18 ENCOUNTER — CLINICAL SUPPORT (OUTPATIENT)
Dept: FAMILY MEDICINE CLINIC | Facility: CLINIC | Age: 68
End: 2023-04-18
Payer: MEDICARE

## 2023-04-18 ENCOUNTER — TELEPHONE (OUTPATIENT)
Dept: FAMILY MEDICINE CLINIC | Facility: CLINIC | Age: 68
End: 2023-04-18

## 2023-04-18 VITALS — SYSTOLIC BLOOD PRESSURE: 118 MMHG | DIASTOLIC BLOOD PRESSURE: 78 MMHG

## 2023-04-18 DIAGNOSIS — I10 PRIMARY HYPERTENSION: Primary | ICD-10-CM

## 2023-04-18 RX ORDER — LISINOPRIL 10 MG/1
10 TABLET ORAL DAILY
Qty: 30 TABLET | Refills: 1 | Status: SHIPPED | OUTPATIENT
Start: 2023-04-18

## 2023-04-18 NOTE — PROGRESS NOTES
Pt presents today for blood pressure check:    Hypertension  -Current medications:  Amlodipine 5 mg daily, Lisinopril 30 mg daily  -Last two blood pressure readings:  -112/68 04/04/2023  -112/64 03/07/2023  -BP today is 118/78  -Pt brought in a list of blood pressure readings for review

## 2023-04-18 NOTE — TELEPHONE ENCOUNTER
----- Message from Janet Mendieta DO sent at 4/18/2023 10:29 AM EDT -----  Regarding: Nurse blood pressure check  Patient is doing very well on her lisinopril 30 mg alone.  I sent in a prescription for lisinopril 10 mg for her to start taking daily, I would like to recheck her blood pressure via nurse visit in 2 weeks

## 2023-04-18 NOTE — ADDENDUM NOTE
Addended by: LISA MCCONNELL on: 4/18/2023 10:30 AM     Modules accepted: Orders, Level of Service

## 2023-04-18 NOTE — PROGRESS NOTES
On appointment of 4/4/2023, patient's anxiety has been successfully treated and she was wondering if she could decrease her hypertension medication since her blood pressures come down with the reduction of her anxiety.    Patient was taking lisinopril 30 mg and amlodipine 5 mg daily.  I asked her to stop taking the amlodipine 5 mg daily, check her blood pressures at home and come in for 2-week nurse visit to check her blood pressures.    Blood pressures are within appropriate range.   Sending in lisinopril 10 mg for patient to start taking (reducing from lisinopril 30 mg daily).  We will have patient do a blood pressure recheck via nurse visit in 2 weeks

## 2023-05-09 ENCOUNTER — TELEPHONE (OUTPATIENT)
Dept: FAMILY MEDICINE CLINIC | Facility: CLINIC | Age: 68
End: 2023-05-09
Payer: MEDICARE

## 2023-05-09 DIAGNOSIS — G89.29 CHRONIC LEFT SHOULDER PAIN: Primary | ICD-10-CM

## 2023-05-09 DIAGNOSIS — M25.512 CHRONIC LEFT SHOULDER PAIN: Primary | ICD-10-CM

## 2023-05-09 NOTE — TELEPHONE ENCOUNTER
Patient has had x-ray on 9/15/2022 of her left shoulder (showed some degenerative changes).  Patient has been going to physical therapy who recommended patient get an MRI to see what their working with in regards to improving her shoulder.  Order for MRI placed

## 2023-05-14 DIAGNOSIS — I10 PRIMARY HYPERTENSION: ICD-10-CM

## 2023-05-15 RX ORDER — LISINOPRIL 30 MG/1
TABLET ORAL
Qty: 90 TABLET | Refills: 1 | OUTPATIENT
Start: 2023-05-15

## 2023-05-19 ENCOUNTER — HOSPITAL ENCOUNTER (OUTPATIENT)
Dept: MRI IMAGING | Facility: HOSPITAL | Age: 68
Discharge: HOME | End: 2023-05-19
Payer: MEDICARE

## 2023-05-19 DIAGNOSIS — G89.29 CHRONIC LEFT SHOULDER PAIN: ICD-10-CM

## 2023-05-19 DIAGNOSIS — M25.512 CHRONIC LEFT SHOULDER PAIN: ICD-10-CM

## 2023-05-19 PROCEDURE — 73221 MRI JOINT UPR EXTREM W/O DYE: CPT

## 2023-05-23 ENCOUNTER — TELEPHONE (OUTPATIENT)
Dept: FAMILY MEDICINE CLINIC | Facility: CLINIC | Age: 68
End: 2023-05-23
Payer: MEDICARE

## 2023-05-23 DIAGNOSIS — M25.512 CHRONIC LEFT SHOULDER PAIN: ICD-10-CM

## 2023-05-23 DIAGNOSIS — G89.29 CHRONIC LEFT SHOULDER PAIN: ICD-10-CM

## 2023-05-23 DIAGNOSIS — S46.002D INJURY OF LEFT ROTATOR CUFF, SUBSEQUENT ENCOUNTER: Primary | ICD-10-CM

## 2023-05-23 NOTE — TELEPHONE ENCOUNTER
Patient agreeable to see orthopedic surgery for left shoulder (MRI results showed a lot of chronic retraction and damage in the rotator cuff).  Patient would like to be referred to Dr. Herrera Durham in [place] as he worked on her other shoulder.  Placing referral

## 2023-05-30 ENCOUNTER — TELEPHONE (OUTPATIENT)
Dept: FAMILY MEDICINE CLINIC | Facility: CLINIC | Age: 68
End: 2023-05-30

## 2023-05-30 NOTE — TELEPHONE ENCOUNTER
PATIENT STATES DR. MCCONNELL WAS SUPPOSED TO REFER HER TO ORTHO FOR HER SHOULDER INJURY HOWEVER WHEN SHE CONTACTED THEM THEY HAD NOT RECEIVED ANY REFERRAL YET. PLEASE F/U ON REFERRAL AND LET PATIENT KNOW .     PATIENT> [phone]

## 2023-07-27 ENCOUNTER — OFFICE VISIT (OUTPATIENT)
Dept: ORTHOPEDIC SURGERY | Facility: CLINIC | Age: 68
End: 2023-07-27
Payer: MEDICARE

## 2023-07-27 ENCOUNTER — PREP FOR SURGERY (OUTPATIENT)
Dept: OTHER | Facility: HOSPITAL | Age: 68
End: 2023-07-27
Payer: MEDICARE

## 2023-07-27 VITALS — WEIGHT: 184.6 LBS | HEIGHT: 67 IN | HEART RATE: 62 BPM | BODY MASS INDEX: 28.97 KG/M2

## 2023-07-27 DIAGNOSIS — R94.5 ABNORMAL RESULTS OF LIVER FUNCTION STUDIES: ICD-10-CM

## 2023-07-27 DIAGNOSIS — R94.120 ABNORMAL AUDITORY FUNCTION STUDY: ICD-10-CM

## 2023-07-27 DIAGNOSIS — R79.1 ABNORMAL COAGULATION PROFILE: ICD-10-CM

## 2023-07-27 DIAGNOSIS — M19.012 OSTEOARTHRITIS OF LEFT GLENOHUMERAL JOINT: Primary | ICD-10-CM

## 2023-07-27 DIAGNOSIS — M75.122 COMPLETE TEAR OF LEFT ROTATOR CUFF, UNSPECIFIED WHETHER TRAUMATIC: ICD-10-CM

## 2023-07-27 DIAGNOSIS — R79.9 ABNORMAL FINDING OF BLOOD CHEMISTRY, UNSPECIFIED: ICD-10-CM

## 2023-07-27 PROCEDURE — 99204 OFFICE O/P NEW MOD 45 MIN: CPT | Performed by: ORTHOPAEDIC SURGERY

## 2023-07-27 RX ORDER — OXYCODONE HCL 10 MG/1
10 TABLET, FILM COATED, EXTENDED RELEASE ORAL ONCE
OUTPATIENT
Start: 2023-07-27 | End: 2023-07-27

## 2023-07-27 RX ORDER — CHLORAL HYDRATE 500 MG
CAPSULE ORAL
COMMUNITY

## 2023-07-27 RX ORDER — MELOXICAM 15 MG/1
15 TABLET ORAL ONCE
OUTPATIENT
Start: 2023-07-27 | End: 2023-07-27

## 2023-07-27 RX ORDER — PREGABALIN 75 MG/1
150 CAPSULE ORAL ONCE
OUTPATIENT
Start: 2023-07-27 | End: 2023-07-27

## 2023-07-27 RX ORDER — TRANEXAMIC ACID 10 MG/ML
1000 INJECTION, SOLUTION INTRAVENOUS ONCE
OUTPATIENT
Start: 2023-07-27 | End: 2023-07-27

## 2023-07-27 NOTE — PROGRESS NOTES
"     Patient ID: Adela Dowell is a 68 y.o. female.    Chief Complaint:    Chief Complaint   Patient presents with    Left Shoulder - Initial Evaluation, Pain     Pain 0 DOI 2/19/2023       HPI:  This is a 68-year-old female here with left shoulder pain she relates after suffering a fall earlier this year.  She is never really been able to regain range of motion of her shoulder.  It pops clicks and grinds.  Is painful deep in the shoulder especially at night she is done physical therapy which did not really help much no previous left shoulder surgery  Past Medical History:   Diagnosis Date    Allergic     Anxiety     Arthritis     Deep vein thrombosis     Depression     Headache     Kidney stone     Presence of left artificial knee joint        Past Surgical History:   Procedure Laterality Date    JOINT REPLACEMENT  2019    KIDNEY STONE SURGERY      ROTATOR CUFF REPAIR Right 2011    x2    SINUS SURGERY Bilateral 2000    SPLENECTOMY      TUBAL ABDOMINAL LIGATION         Family History   Problem Relation Age of Onset    Other Mother         Alzheimer's    Cancer Father         bladder cancer    Diabetes Sister     Diabetes Brother     Thyroid disease Daughter     No Known Problems Maternal Grandmother     No Known Problems Maternal Grandfather     No Known Problems Paternal Grandmother     No Known Problems Paternal Grandfather           Social History     Occupational History    Not on file   Tobacco Use    Smoking status: Never     Passive exposure: Past    Smokeless tobacco: Never   Vaping Use    Vaping Use: Never used   Substance and Sexual Activity    Alcohol use: Never    Drug use: Never    Sexual activity: Not Currently     Partners: Male     Birth control/protection: Post-menopausal     Comment: monogamous with       Review of Systems   Cardiovascular:  Negative for chest pain.   Musculoskeletal:  Positive for arthralgias.     Objective:    Pulse 62   Ht 170.2 cm (67\")   Wt 83.7 kg (184 lb 9.6 oz)   " BMI 28.91 kg/m²     Physical Examination:    Left shoulder demonstrates intact skin no deformity of the bicep noted mild supraspinatus atrophy noted diffuse tenderness around the bicep groove and impingement area passive elevation 130 abduction 100 external rotation 20 active elevation 70 with pain and weakness on Speed, [place], and supraspinatus testing.  Belly press and liftoff are 4/5 and 3/5.  Sensory and motor exam are intact all distributions. Radial pulse is palpable and capillary refill is less than two seconds to all digits.  Imaging:  Prior x-ray and MRI reviewed demonstrate on x-ray well-maintained joint spaces with reduced acromiohumeral distance MRI demonstrates massive 3 cm retracted tears of the supraspinatus and infraspinatus with atrophy and dislocation of the bicep tendon and a full-thickness upper subscapularis tear with moderate arthritis    Assessment:  Left shoulder degenerative disease massive cuff tear    Plan:  Conservative or arthroscopic versus other surgical options discussed she would like to proceed with left reverse total shoulder arthroplasty  Discussed the risks including bleeding, scar, infection, stiffness, nerve, artery, vein and tendon damage, instability, fracture, DVT, loss of life or limb. Issues of scapular notching and component revision were discussed. Questions were answered and addressed.  Postop sling dispensed  Greater than 15 minutes was spent demonstrating proper fit and use of the device and signs to monitor for complications       Procedures         Disclaimer: Part of this note may be an electronic transcription/translation of spoken language to printed text using the Dragon Dictation System

## 2023-07-31 ENCOUNTER — OFFICE VISIT (OUTPATIENT)
Dept: FAMILY MEDICINE CLINIC | Facility: CLINIC | Age: 68
End: 2023-07-31
Payer: MEDICARE

## 2023-07-31 ENCOUNTER — HOSPITAL ENCOUNTER (OUTPATIENT)
Dept: CT IMAGING | Facility: HOSPITAL | Age: 68
Discharge: HOME | End: 2023-07-31
Admitting: STUDENT IN AN ORGANIZED HEALTH CARE EDUCATION/TRAINING PROGRAM
Payer: MEDICARE

## 2023-07-31 VITALS
HEIGHT: 67 IN | SYSTOLIC BLOOD PRESSURE: 114 MMHG | DIASTOLIC BLOOD PRESSURE: 60 MMHG | BODY MASS INDEX: 29.19 KG/M2 | WEIGHT: 186 LBS | HEART RATE: 65 BPM | OXYGEN SATURATION: 95 % | TEMPERATURE: 97.3 F | RESPIRATION RATE: 16 BRPM

## 2023-07-31 DIAGNOSIS — Z87.442 HISTORY OF KIDNEY STONES: ICD-10-CM

## 2023-07-31 DIAGNOSIS — N12 PYELONEPHRITIS: Primary | ICD-10-CM

## 2023-07-31 DIAGNOSIS — R10.9 RIGHT FLANK PAIN: ICD-10-CM

## 2023-07-31 DIAGNOSIS — E66.09 CLASS 1 OBESITY DUE TO EXCESS CALORIES WITHOUT SERIOUS COMORBIDITY WITH BODY MASS INDEX (BMI) OF 32.0 TO 32.9 IN ADULT: ICD-10-CM

## 2023-07-31 LAB
BILIRUB BLD-MCNC: NEGATIVE MG/DL
CLARITY, POC: ABNORMAL
COLOR UR: YELLOW
GLUCOSE UR STRIP-MCNC: NEGATIVE MG/DL
KETONES UR QL: NEGATIVE
LEUKOCYTE EST, POC: NEGATIVE
NITRITE UR-MCNC: NEGATIVE MG/ML
PH UR: 1 [PH] (ref 5–8)
PROT UR STRIP-MCNC: ABNORMAL MG/DL
RBC # UR STRIP: ABNORMAL /UL
SP GR UR: 1.02 (ref 1–1.03)
UROBILINOGEN UR QL: NORMAL

## 2023-07-31 PROCEDURE — 81002 URINALYSIS NONAUTO W/O SCOPE: CPT | Performed by: STUDENT IN AN ORGANIZED HEALTH CARE EDUCATION/TRAINING PROGRAM

## 2023-07-31 PROCEDURE — 74176 CT ABD & PELVIS W/O CONTRAST: CPT

## 2023-07-31 PROCEDURE — 3074F SYST BP LT 130 MM HG: CPT | Performed by: STUDENT IN AN ORGANIZED HEALTH CARE EDUCATION/TRAINING PROGRAM

## 2023-07-31 PROCEDURE — 99213 OFFICE O/P EST LOW 20 MIN: CPT | Performed by: STUDENT IN AN ORGANIZED HEALTH CARE EDUCATION/TRAINING PROGRAM

## 2023-07-31 PROCEDURE — 3078F DIAST BP <80 MM HG: CPT | Performed by: STUDENT IN AN ORGANIZED HEALTH CARE EDUCATION/TRAINING PROGRAM

## 2023-07-31 RX ORDER — CIPROFLOXACIN 500 MG/1
500 TABLET, FILM COATED ORAL 2 TIMES DAILY
Qty: 14 TABLET | Refills: 0 | Status: SHIPPED | OUTPATIENT
Start: 2023-07-31

## 2023-08-01 ENCOUNTER — TELEPHONE (OUTPATIENT)
Dept: FAMILY MEDICINE CLINIC | Facility: CLINIC | Age: 68
End: 2023-08-01
Payer: MEDICARE

## 2023-08-01 DIAGNOSIS — N20.1 URETERAL STONE: Primary | ICD-10-CM

## 2023-08-01 RX ORDER — TAMSULOSIN HYDROCHLORIDE 0.4 MG/1
1 CAPSULE ORAL DAILY
Qty: 30 CAPSULE | Refills: 0 | Status: SHIPPED | OUTPATIENT
Start: 2023-08-01

## 2023-08-02 LAB
BACTERIA UR CULT: NORMAL
BACTERIA UR CULT: NORMAL

## 2023-08-08 PROBLEM — M19.012 OSTEOARTHRITIS OF LEFT GLENOHUMERAL JOINT: Status: ACTIVE | Noted: 2023-08-08

## 2023-08-29 DIAGNOSIS — N20.1 URETERAL STONE: ICD-10-CM

## 2023-08-29 RX ORDER — TAMSULOSIN HYDROCHLORIDE 0.4 MG/1
CAPSULE ORAL
Qty: 30 CAPSULE | Refills: 0 | OUTPATIENT
Start: 2023-08-29

## 2023-08-29 NOTE — TELEPHONE ENCOUNTER
Received refill request for tamsulosin 0.4 mg.  This medication was sent in as 30 capsules with 0 refills due to a kidney stone that she was passing at that time.  This appears to be an automatic refill will decline at this time

## 2023-09-08 ENCOUNTER — PRE-ADMISSION TESTING (OUTPATIENT)
Dept: PREADMISSION TESTING | Facility: HOSPITAL | Age: 68
End: 2023-09-08
Payer: MEDICARE

## 2023-09-08 ENCOUNTER — LAB (OUTPATIENT)
Dept: LAB | Facility: HOSPITAL | Age: 68
End: 2023-09-08
Payer: MEDICARE

## 2023-09-08 ENCOUNTER — HOSPITAL ENCOUNTER (OUTPATIENT)
Dept: CARDIOLOGY | Facility: HOSPITAL | Age: 68
Discharge: HOME | End: 2023-09-08
Payer: MEDICARE

## 2023-09-08 VITALS
OXYGEN SATURATION: 97 % | TEMPERATURE: 97.8 F | BODY MASS INDEX: 29.51 KG/M2 | WEIGHT: 188 LBS | DIASTOLIC BLOOD PRESSURE: 93 MMHG | HEIGHT: 67 IN | HEART RATE: 70 BPM | RESPIRATION RATE: 18 BRPM | SYSTOLIC BLOOD PRESSURE: 144 MMHG

## 2023-09-08 DIAGNOSIS — M19.012 OSTEOARTHRITIS OF LEFT GLENOHUMERAL JOINT: ICD-10-CM

## 2023-09-08 DIAGNOSIS — R79.9 ABNORMAL FINDING OF BLOOD CHEMISTRY, UNSPECIFIED: ICD-10-CM

## 2023-09-08 DIAGNOSIS — R94.120 ABNORMAL AUDITORY FUNCTION STUDY: ICD-10-CM

## 2023-09-08 DIAGNOSIS — R79.1 ABNORMAL COAGULATION PROFILE: ICD-10-CM

## 2023-09-08 DIAGNOSIS — R94.5 ABNORMAL RESULTS OF LIVER FUNCTION STUDIES: ICD-10-CM

## 2023-09-08 LAB
ABO GROUP BLD: NORMAL
ANION GAP SERPL CALCULATED.3IONS-SCNC: 11.5 MMOL/L (ref 5–15)
APTT PPP: 30.9 SECONDS (ref 24–31)
BASOPHILS # BLD AUTO: 0.07 10*3/MM3 (ref 0–0.2)
BASOPHILS NFR BLD AUTO: 0.9 % (ref 0–1.5)
BLD GP AB SCN SERPL QL: NEGATIVE
BUN SERPL-MCNC: 15 MG/DL (ref 8–23)
BUN/CREAT SERPL: 20.8 (ref 7–25)
CALCIUM SPEC-SCNC: 9.7 MG/DL (ref 8.6–10.5)
CHLORIDE SERPL-SCNC: 104 MMOL/L (ref 98–107)
CO2 SERPL-SCNC: 25.5 MMOL/L (ref 22–29)
CREAT SERPL-MCNC: 0.72 MG/DL (ref 0.57–1)
DEPRECATED RDW RBC AUTO: 40.3 FL (ref 37–54)
EGFRCR SERPLBLD CKD-EPI 2021: 92.3 ML/MIN/1.73
EOSINOPHIL # BLD AUTO: 0.26 10*3/MM3 (ref 0–0.4)
EOSINOPHIL NFR BLD AUTO: 3.4 % (ref 0.3–6.2)
ERYTHROCYTE [DISTWIDTH] IN BLOOD BY AUTOMATED COUNT: 13 % (ref 12.3–15.4)
GLUCOSE SERPL-MCNC: 98 MG/DL (ref 65–99)
HBA1C MFR BLD: 5.7 % (ref 4.8–5.6)
HCT VFR BLD AUTO: 45.5 % (ref 34–46.6)
HGB BLD-MCNC: 15.1 G/DL (ref 12–15.9)
IMM GRANULOCYTES # BLD AUTO: 0.03 10*3/MM3 (ref 0–0.05)
IMM GRANULOCYTES NFR BLD AUTO: 0.4 % (ref 0–0.5)
INR PPP: 0.97 (ref 0.93–1.1)
LYMPHOCYTES # BLD AUTO: 2.3 10*3/MM3 (ref 0.7–3.1)
LYMPHOCYTES NFR BLD AUTO: 30.4 % (ref 19.6–45.3)
MCH RBC QN AUTO: 28.8 PG (ref 26.6–33)
MCHC RBC AUTO-ENTMCNC: 33.2 G/DL (ref 31.5–35.7)
MCV RBC AUTO: 86.8 FL (ref 79–97)
MONOCYTES # BLD AUTO: 0.44 10*3/MM3 (ref 0.1–0.9)
MONOCYTES NFR BLD AUTO: 5.8 % (ref 5–12)
MRSA DNA SPEC QL NAA+PROBE: NORMAL
NEUTROPHILS NFR BLD AUTO: 4.47 10*3/MM3 (ref 1.7–7)
NEUTROPHILS NFR BLD AUTO: 59.1 % (ref 42.7–76)
NRBC BLD AUTO-RTO: 0 /100 WBC (ref 0–0.2)
PLATELET # BLD AUTO: 416 10*3/MM3 (ref 140–450)
PMV BLD AUTO: 10.9 FL (ref 6–12)
POTASSIUM SERPL-SCNC: 4.5 MMOL/L (ref 3.5–5.2)
PROTHROMBIN TIME: 10.4 SECONDS (ref 9.6–11.7)
RBC # BLD AUTO: 5.24 10*6/MM3 (ref 3.77–5.28)
RH BLD: POSITIVE
SODIUM SERPL-SCNC: 141 MMOL/L (ref 136–145)
T&S EXPIRATION DATE: NORMAL
WBC NRBC COR # BLD: 7.57 10*3/MM3 (ref 3.4–10.8)

## 2023-09-08 PROCEDURE — 87641 MR-STAPH DNA AMP PROBE: CPT

## 2023-09-08 PROCEDURE — 85730 THROMBOPLASTIN TIME PARTIAL: CPT

## 2023-09-08 PROCEDURE — 86900 BLOOD TYPING SEROLOGIC ABO: CPT

## 2023-09-08 PROCEDURE — 86901 BLOOD TYPING SEROLOGIC RH(D): CPT

## 2023-09-08 PROCEDURE — 86850 RBC ANTIBODY SCREEN: CPT

## 2023-09-08 PROCEDURE — 36415 COLL VENOUS BLD VENIPUNCTURE: CPT

## 2023-09-08 PROCEDURE — 85025 COMPLETE CBC W/AUTO DIFF WBC: CPT

## 2023-09-08 PROCEDURE — 97165 OT EVAL LOW COMPLEX 30 MIN: CPT

## 2023-09-08 PROCEDURE — 83036 HEMOGLOBIN GLYCOSYLATED A1C: CPT

## 2023-09-08 PROCEDURE — 93005 ELECTROCARDIOGRAM TRACING: CPT | Performed by: ORTHOPAEDIC SURGERY

## 2023-09-08 PROCEDURE — 80048 BASIC METABOLIC PNL TOTAL CA: CPT

## 2023-09-08 PROCEDURE — 85610 PROTHROMBIN TIME: CPT

## 2023-09-08 RX ORDER — FLUOXETINE HYDROCHLORIDE 20 MG/1
20 CAPSULE ORAL DAILY
COMMUNITY

## 2023-09-08 NOTE — THERAPY EVALUATION
Patient Name: Adela Dowell  : 1955    MRN: [id number]                              Today's Date: 2023       Admit Date: (Not on file)    Visit Dx: No diagnosis found.  Patient Active Problem List   Diagnosis    Hypertension    Disorder of bursae and tendons in shoulder region    Chronic headaches    Arthritis    Anemia    H/O splenectomy    Osteopenia of lumbar spine    Depressive disorder    Class 1 obesity due to excess calories with body mass index (BMI) of 32.0 to 32.9 in adult    History of kidney stones    Osteoarthritis of left glenohumeral joint     Past Medical History:   Diagnosis Date    Anxiety     Arthritis     Depression     Headache     Hypertension     Kidney stone     Low back pain     Obesity     Presence of left artificial knee joint     Slow to wake up after anesthesia     Urinary tract infection      Past Surgical History:   Procedure Laterality Date    JOINT REPLACEMENT  2019    KIDNEY STONE SURGERY      ROTATOR CUFF REPAIR Right 2011    x2    SINUS SURGERY Bilateral     SPLENECTOMY      TUBAL ABDOMINAL LIGATION        General Information       Row Name 23 1144          OT Time and Intention    Document Type evaluation  -MP     Mode of Treatment individual therapy  -MP       Row Name 23 1144          General Information    Patient Profile Reviewed yes  -MP     Prior Level of Function independent:;ADL's  -MP       Row Name 23 1144          Living Environment    People in Home spouse  -MP       Row Name 23 1144          Home Main Entrance    Number of Stairs, Main Entrance none  -MP       Row Name 23 1652 23 1144       Cognition    Orientation Status (Cognition) oriented x 3  -MP oriented x 3  -MP              User Key  (r) = Recorded By, (t) = Taken By, (c) = Cosigned By      Initials Name Provider Type    MP Thomas Cabral OT Occupational Therapist                     Mobility/ADL's    No documentation.                  Obj/Interventions        Row Name 09/08/23 1654          Range of Motion Comprehensive    Comment, General Range of Motion L shoulder ROM impacted 25-50%  -MP       Row Name 09/08/23 1654          Strength Comprehensive (MMT)    Comment, General Manual Muscle Testing (MMT) Assessment Patient w/ h/o bicep tendon tear SEFERINO ENGLISH WFL  -MP       [facility] Name 09/08/23 1654          Balance    Balance Interventions sitting;standing;sit to stand;supported;static;dynamic  -MP               User Key  (r) = Recorded By, (t) = Taken By, (c) = Cosigned By      Initials Name Provider Type    Thomas Dunne OT Occupational Therapist                   Goals/Plan    No documentation.                  Clinical Impression       [facility] Name 09/08/23 1655          Pain Assessment    Pretreatment Pain Rating 0/10 - no pain  -MP     Posttreatment Pain Rating 0/10 - no pain  -MP       [facility] Name 09/08/23 1655          Plan of Care Review    Plan of Care Reviewed With patient  -MP     Progress no change  -MP     Outcome Evaluation Pt. is 69 y/o female seen preoperatively for LUE rTSA planned for 9/20. Pt. states she had history of fall in Feb 2023 resulting in ulnar/radial fractures of LUE in addition to complete L bicep tendon tear. Pt. 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 Pt. educated on rTSA precautions, HEP, and hemidressing technique for donning/doffing sling and icepack, demonstration provided. Pt. w/ h/o R rotator cuff repair x 2, may require compensatory techniques for AAROM HEP. Anticipate OP therapy at d/c, will follow up  w/ pt. POD # 0 for reevaluation.  -[facility] Name 09/08/23 1655          Therapy Assessment/Plan (OT)    Criteria for Skilled Therapeutic Interventions Met (OT) no;no problems identified which require skilled intervention;does not meet criteria for skilled intervention  -MP     Therapy Frequency (OT) evaluation only  -[facility] Name 09/08/23 1655          Therapy Plan  Review/Discharge Plan (OT)    Anticipated Discharge Disposition (OT) home with assist;home with outpatient therapy services  -MP       Row Name 09/08/23 1655          Vital Signs    Pre Patient Position Standing  -MP     Intra Patient Position Sitting  -MP     Post Patient Position Standing  -MP               User Key  (r) = Recorded By, (t) = Taken By, (c) = Cosigned By      Initials Name Provider Type    Thomas Dunne OT Occupational Therapist                   Outcome Measures    No documentation.                     OT Recommendation and Plan  Therapy Frequency (OT): evaluation only  Plan of Care Review  Plan of Care Reviewed With: patient  Progress: no change  Outcome Evaluation: Pt. is 69 y/o female seen preoperatively for LUE rTSA planned for 9/20. Pt. states she had history of fall in Feb 2023 resulting in ulnar/radial fractures of LUE in addition to complete L bicep tendon tear. Pt. 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 Pt. educated on rTSA precautions, HEP, and hemidressing technique for donning/doffing sling and icepack, demonstration provided. Pt. w/ h/o R rotator cuff repair x 2, may require compensatory techniques for AAROM HEP. Anticipate OP therapy at d/c, will follow up  w/ pt. POD # 0 for reevaluation.     Time Calculation:         Time Calculation- OT       Row Name 09/08/23 1659             Time Calculation- OT    OT Start Time 1145  -MP      OT Stop Time 1215  -MP      OT Time Calculation (min) 30 min  -MP      Total Timed Code Minutes- OT 0 minute(s)  -MP      OT Received On 09/08/23  -      OT - Next Appointment 09/20/23  -                User Key  (r) = Recorded By, (t) = Taken By, (c) = Cosigned By      Initials Name Provider Type    Thomas Dunne OT Occupational Therapist                  Therapy Charges for Today       Code Description Service Date Service Provider Modifiers Qty    [id number]  OT EVAL LOW COMPLEXITY  4 9/8/2023 Thomas Cabral, OT GO 1                 Thomas Cabral, OT  9/8/2023

## 2023-09-08 NOTE — PLAN OF CARE
Goal Outcome Evaluation:  Plan of Care Reviewed With: patient        Progress: no change  Outcome Evaluation: Pt. is 69 y/o female seen preoperatively for LUE rTSA planned for 9/20. Pt. states she had history of fall in Feb 2023 resulting in ulnar/radial fractures of LUE in addition to complete L bicep tendon tear. Pt. 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 Pt. educated on rTSA precautions, HEP, and hemidressing technique for donning/doffing sling and icepack, demonstration provided. Pt. w/ h/o R rotator cuff repair x 2, may require compensatory techniques for AAROM HEP. Anticipate OP therapy at d/c, will follow up  w/ pt. POD # 0 for reevaluation.      Anticipated Discharge Disposition (OT): home with assist, home with outpatient therapy services

## 2023-09-09 LAB
QT INTERVAL: 416 MS
QTC INTERVAL: 420 MS

## 2023-09-18 NOTE — H&P
[facility]   HISTORY AND PHYSICAL    Patient Name: Adela Dowell  : 1955  MRN: [id number]  Primary Care Physician:  Janet Mendieta DO  Date of admission: (Not on file)    Subjective   Subjective     Chief Complaint: Left shoulder pain    This is a 68-year-old female presenting after a fall for shoulder arthroplasty        Review of Systems   Cardiovascular:  Negative for chest pain.   Musculoskeletal:  Positive for arthralgias.      Personal History     Past Medical History:   Diagnosis Date    Anxiety     Arthritis     Depression     Headache     Hypertension     Kidney stone     Low back pain     Obesity     Presence of left artificial knee joint     Slow to wake up after anesthesia     Urinary tract infection        Past Surgical History:   Procedure Laterality Date    JOINT REPLACEMENT  2019    KIDNEY STONE SURGERY      ROTATOR CUFF REPAIR Right 2011    x2    SINUS SURGERY Bilateral     SPLENECTOMY      TUBAL ABDOMINAL LIGATION         Family History: family history includes Cancer in her father; Diabetes in her brother and sister; No Known Problems in her maternal grandfather, maternal grandmother, paternal grandfather, and paternal grandmother; Other in her mother; Thyroid disease in her daughter. Otherwise pertinent FHx was reviewed and not pertinent to current issue.    Social History:  reports that she has never smoked. She has been exposed to tobacco smoke. She has never used smokeless tobacco. She reports that she does not drink alcohol and does not use drugs.    Home Medications:  FLUoxetine, Magnesium, Misc Natural Products, aspirin-acetaminophen-caffeine, ciprofloxacin, fish oil, tamsulosin, and vitamin D3    Allergies:  Allergies   Allergen Reactions    Levofloxacin Swelling and Myalgia     Joint swelling       Objective    Objective     Left shoulder demonstrates intact skin no deformity of the bicep noted mild supraspinatus atrophy noted diffuse tenderness around the bicep groove  and impingement area passive elevation 130 abduction 100 external rotation 20 active elevation 70 with pain and weakness on Speed, [place], and supraspinatus testing.  Belly press and liftoff are 4/5 and 3/5.  Sensory and motor exam are intact all distributions. Radial pulse is palpable and capillary refill is less than two seconds to all digits.  Imaging:  Prior x-ray and MRI reviewed demonstrate on x-ray well-maintained joint spaces with reduced acromiohumeral distance MRI demonstrates massive 3 cm retracted tears of the supraspinatus and infraspinatus with atrophy and dislocation of the bicep tendon and a full-thickness upper subscapularis tear with moderate arthritis     Assessment:  Left shoulder degenerative disease massive cuff tear     Plan:  Conservative or arthroscopic versus other surgical options discussed she would like to proceed with left reverse total shoulder arthroplasty  Discussed the risks including bleeding, scar, infection, stiffness, nerve, artery, vein and tendon damage, instability, fracture, DVT, loss of life or limb. Issues of scapular notching and component revision were discussed. Questions were answered and addressed.  Postop sling dispensed  Greater than 15 minutes was spent demonstrating proper fit and use of the device and signs to monitor for complications    Aureliano Roberts MD

## 2023-09-20 ENCOUNTER — APPOINTMENT (OUTPATIENT)
Dept: GENERAL RADIOLOGY | Facility: HOSPITAL | Age: 68
End: 2023-09-20
Payer: MEDICARE

## 2023-09-20 ENCOUNTER — HOSPITAL ENCOUNTER (OUTPATIENT)
Facility: HOSPITAL | Age: 68
Setting detail: OBSERVATION
Discharge: HOME | End: 2023-09-21
Attending: ORTHOPAEDIC SURGERY | Admitting: ORTHOPAEDIC SURGERY
Payer: MEDICARE

## 2023-09-20 ENCOUNTER — ANESTHESIA EVENT (OUTPATIENT)
Dept: PERIOP | Facility: HOSPITAL | Age: 68
End: 2023-09-20
Payer: MEDICARE

## 2023-09-20 ENCOUNTER — ANESTHESIA (OUTPATIENT)
Dept: PERIOP | Facility: HOSPITAL | Age: 68
End: 2023-09-20
Payer: MEDICARE

## 2023-09-20 DIAGNOSIS — M19.012 OSTEOARTHRITIS OF LEFT GLENOHUMERAL JOINT: ICD-10-CM

## 2023-09-20 DIAGNOSIS — M19.012 PRIMARY OSTEOARTHRITIS OF LEFT SHOULDER: Primary | ICD-10-CM

## 2023-09-20 PROCEDURE — 97168 OT RE-EVAL EST PLAN CARE: CPT

## 2023-09-20 PROCEDURE — C9290 INJ, BUPIVACAINE LIPOSOME: HCPCS | Performed by: ANESTHESIOLOGY

## 2023-09-20 PROCEDURE — 25010000002 SUGAMMADEX 200 MG/2ML SOLUTION: Performed by: NURSE ANESTHETIST, CERTIFIED REGISTERED

## 2023-09-20 PROCEDURE — 23472 RECONSTRUCT SHOULDER JOINT: CPT | Performed by: PHYSICIAN ASSISTANT

## 2023-09-20 PROCEDURE — 25010000002 FENTANYL CITRATE (PF) 50 MCG/ML SOLUTION: Performed by: ANESTHESIOLOGY

## 2023-09-20 PROCEDURE — G0378 HOSPITAL OBSERVATION PER HR: HCPCS

## 2023-09-20 PROCEDURE — 25010000002 CEFOXITIN PER 1 G: Performed by: ORTHOPAEDIC SURGERY

## 2023-09-20 PROCEDURE — C1776 JOINT DEVICE (IMPLANTABLE): HCPCS | Performed by: ORTHOPAEDIC SURGERY

## 2023-09-20 PROCEDURE — 25010000002 DEXAMETHASONE PER 1 MG: Performed by: ANESTHESIOLOGY

## 2023-09-20 PROCEDURE — 0 BUPIVACAINE LIPOSOME 1.3 % SUSPENSION: Performed by: ANESTHESIOLOGY

## 2023-09-20 PROCEDURE — 25010000002 PROPOFOL 10 MG/ML EMULSION: Performed by: NURSE ANESTHETIST, CERTIFIED REGISTERED

## 2023-09-20 PROCEDURE — 23472 RECONSTRUCT SHOULDER JOINT: CPT | Performed by: ORTHOPAEDIC SURGERY

## 2023-09-20 PROCEDURE — 25010000002 MIDAZOLAM PER 1 MG: Performed by: ANESTHESIOLOGY

## 2023-09-20 PROCEDURE — 73030 X-RAY EXAM OF SHOULDER: CPT

## 2023-09-20 PROCEDURE — 25010000002 BUPIVACAINE (PF) 0.25 % SOLUTION 30 ML VIAL: Performed by: ORTHOPAEDIC SURGERY

## 2023-09-20 PROCEDURE — 25010000002 CEFAZOLIN PER 500 MG: Performed by: ORTHOPAEDIC SURGERY

## 2023-09-20 PROCEDURE — 25010000002 VANCOMYCIN 1 G RECONSTITUTED SOLUTION 1 EACH VIAL: Performed by: ORTHOPAEDIC SURGERY

## 2023-09-20 PROCEDURE — 25010000002 BUPIVACAINE (PF) 0.5 % SOLUTION: Performed by: ANESTHESIOLOGY

## 2023-09-20 PROCEDURE — C1713 ANCHOR/SCREW BN/BN,TIS/BN: HCPCS | Performed by: ORTHOPAEDIC SURGERY

## 2023-09-20 PROCEDURE — 25010000002 PROPOFOL 200 MG/20ML EMULSION: Performed by: NURSE ANESTHETIST, CERTIFIED REGISTERED

## 2023-09-20 PROCEDURE — 25010000002 ONDANSETRON PER 1 MG: Performed by: NURSE ANESTHETIST, CERTIFIED REGISTERED

## 2023-09-20 DEVICE — STEM HUM REV NONPOR 13X130MM: Type: IMPLANTABLE DEVICE | Site: SHOULDER | Status: FUNCTIONAL

## 2023-09-20 DEVICE — SUT NONABS MAXBRAID/PE NMBR2 HC5 38IN BLU 900334: Type: IMPLANTABLE DEVICE | Site: SHOULDER | Status: FUNCTIONAL

## 2023-09-20 DEVICE — LINER HUM REV TRABECULAR REV PA 7D 36 PL: Type: IMPLANTABLE DEVICE | Site: SHOULDER | Status: FUNCTIONAL

## 2023-09-20 DEVICE — TOTL SHLDER REV: Type: IMPLANTABLE DEVICE | Site: SHOULDER | Status: FUNCTIONAL

## 2023-09-20 DEVICE — BASEPLT GLEN TRABECULARMETAL REV 15MM: Type: IMPLANTABLE DEVICE | Site: SHOULDER | Status: FUNCTIONAL

## 2023-09-20 DEVICE — GLENSPHR TRABECULARMETAL REV 36MM: Type: IMPLANTABLE DEVICE | Site: SHOULDER | Status: FUNCTIONAL

## 2023-09-20 DEVICE — INVERSE/REVERSE SCREW SYSTEM, 4.5-36
Type: IMPLANTABLE DEVICE | Site: SHOULDER | Status: FUNCTIONAL
Brand: INVERSE/REVERSE

## 2023-09-20 RX ORDER — DEXAMETHASONE SODIUM PHOSPHATE 4 MG/ML
INJECTION, SOLUTION INTRA-ARTICULAR; INTRALESIONAL; INTRAMUSCULAR; INTRAVENOUS; SOFT TISSUE
Status: COMPLETED | OUTPATIENT
Start: 2023-09-20 | End: 2023-09-20

## 2023-09-20 RX ORDER — ACETAMINOPHEN 325 MG/1
650 TABLET ORAL ONCE AS NEEDED
Status: DISCONTINUED | OUTPATIENT
Start: 2023-09-20 | End: 2023-09-21 | Stop reason: HOSPADM

## 2023-09-20 RX ORDER — SODIUM CHLORIDE, SODIUM LACTATE, POTASSIUM CHLORIDE, CALCIUM CHLORIDE 600; 310; 30; 20 MG/100ML; MG/100ML; MG/100ML; MG/100ML
1000 INJECTION, SOLUTION INTRAVENOUS CONTINUOUS
Status: DISCONTINUED | OUTPATIENT
Start: 2023-09-20 | End: 2023-09-21

## 2023-09-20 RX ORDER — PROPOFOL 10 MG/ML
INJECTION, EMULSION INTRAVENOUS AS NEEDED
Status: DISCONTINUED | OUTPATIENT
Start: 2023-09-20 | End: 2023-09-20 | Stop reason: SURG

## 2023-09-20 RX ORDER — FENTANYL CITRATE 50 UG/ML
INJECTION, SOLUTION INTRAMUSCULAR; INTRAVENOUS
Status: COMPLETED | OUTPATIENT
Start: 2023-09-20 | End: 2023-09-20

## 2023-09-20 RX ORDER — MIDAZOLAM HYDROCHLORIDE 1 MG/ML
INJECTION INTRAMUSCULAR; INTRAVENOUS
Status: COMPLETED | OUTPATIENT
Start: 2023-09-20 | End: 2023-09-20

## 2023-09-20 RX ORDER — TRANEXAMIC ACID 10 MG/ML
1000 INJECTION, SOLUTION INTRAVENOUS ONCE
Status: COMPLETED | OUTPATIENT
Start: 2023-09-20 | End: 2023-09-20

## 2023-09-20 RX ORDER — PREGABALIN 75 MG/1
150 CAPSULE ORAL ONCE
Status: COMPLETED | OUTPATIENT
Start: 2023-09-20 | End: 2023-09-20

## 2023-09-20 RX ORDER — OXYCODONE HCL 10 MG/1
10 TABLET, FILM COATED, EXTENDED RELEASE ORAL ONCE
Status: COMPLETED | OUTPATIENT
Start: 2023-09-20 | End: 2023-09-20

## 2023-09-20 RX ORDER — ACETAMINOPHEN 650 MG
TABLET, EXTENDED RELEASE ORAL AS NEEDED
Status: DISCONTINUED | OUTPATIENT
Start: 2023-09-20 | End: 2023-09-20 | Stop reason: HOSPADM

## 2023-09-20 RX ORDER — TAMSULOSIN HYDROCHLORIDE 0.4 MG/1
0.4 CAPSULE ORAL DAILY
Status: DISCONTINUED | OUTPATIENT
Start: 2023-09-20 | End: 2023-09-20 | Stop reason: HOSPADM

## 2023-09-20 RX ORDER — OXYCODONE HYDROCHLORIDE 5 MG/1
5 TABLET ORAL ONCE AS NEEDED
Status: DISCONTINUED | OUTPATIENT
Start: 2023-09-20 | End: 2023-09-20 | Stop reason: HOSPADM

## 2023-09-20 RX ORDER — OXYCODONE HYDROCHLORIDE 5 MG/1
10 TABLET ORAL EVERY 4 HOURS PRN
Status: DISCONTINUED | OUTPATIENT
Start: 2023-09-20 | End: 2023-09-20 | Stop reason: HOSPADM

## 2023-09-20 RX ORDER — DIPHENHYDRAMINE HYDROCHLORIDE 50 MG/ML
12.5 INJECTION INTRAMUSCULAR; INTRAVENOUS
Status: DISCONTINUED | OUTPATIENT
Start: 2023-09-20 | End: 2023-09-20 | Stop reason: HOSPADM

## 2023-09-20 RX ORDER — NALOXONE HCL 0.4 MG/ML
0.4 VIAL (ML) INJECTION AS NEEDED
Status: DISCONTINUED | OUTPATIENT
Start: 2023-09-20 | End: 2023-09-20 | Stop reason: HOSPADM

## 2023-09-20 RX ORDER — BUPIVACAINE HYDROCHLORIDE 5 MG/ML
INJECTION, SOLUTION EPIDURAL; INTRACAUDAL
Status: COMPLETED | OUTPATIENT
Start: 2023-09-20 | End: 2023-09-20

## 2023-09-20 RX ORDER — HYDRALAZINE HYDROCHLORIDE 20 MG/ML
5 INJECTION INTRAMUSCULAR; INTRAVENOUS
Status: DISCONTINUED | OUTPATIENT
Start: 2023-09-20 | End: 2023-09-20 | Stop reason: HOSPADM

## 2023-09-20 RX ORDER — EPHEDRINE SULFATE 5 MG/ML
INJECTION INTRAVENOUS AS NEEDED
Status: DISCONTINUED | OUTPATIENT
Start: 2023-09-20 | End: 2023-09-20 | Stop reason: SURG

## 2023-09-20 RX ORDER — SODIUM CHLORIDE 0.9 % (FLUSH) 0.9 %
10 SYRINGE (ML) INJECTION AS NEEDED
Status: DISCONTINUED | OUTPATIENT
Start: 2023-09-20 | End: 2023-09-20 | Stop reason: HOSPADM

## 2023-09-20 RX ORDER — ROCURONIUM BROMIDE 10 MG/ML
INJECTION, SOLUTION INTRAVENOUS AS NEEDED
Status: DISCONTINUED | OUTPATIENT
Start: 2023-09-20 | End: 2023-09-20 | Stop reason: SURG

## 2023-09-20 RX ORDER — IPRATROPIUM BROMIDE AND ALBUTEROL SULFATE 2.5; .5 MG/3ML; MG/3ML
3 SOLUTION RESPIRATORY (INHALATION) ONCE AS NEEDED
Status: DISCONTINUED | OUTPATIENT
Start: 2023-09-20 | End: 2023-09-20 | Stop reason: HOSPADM

## 2023-09-20 RX ORDER — ONDANSETRON 2 MG/ML
4 INJECTION INTRAMUSCULAR; INTRAVENOUS ONCE AS NEEDED
Status: DISCONTINUED | OUTPATIENT
Start: 2023-09-20 | End: 2023-09-20 | Stop reason: HOSPADM

## 2023-09-20 RX ORDER — MELOXICAM 15 MG/1
15 TABLET ORAL ONCE
Status: COMPLETED | OUTPATIENT
Start: 2023-09-20 | End: 2023-09-20

## 2023-09-20 RX ORDER — ONDANSETRON 2 MG/ML
INJECTION INTRAMUSCULAR; INTRAVENOUS AS NEEDED
Status: DISCONTINUED | OUTPATIENT
Start: 2023-09-20 | End: 2023-09-20 | Stop reason: SURG

## 2023-09-20 RX ORDER — LIDOCAINE HYDROCHLORIDE 10 MG/ML
0.5 INJECTION, SOLUTION INFILTRATION; PERINEURAL ONCE AS NEEDED
Status: DISCONTINUED | OUTPATIENT
Start: 2023-09-20 | End: 2023-09-20 | Stop reason: HOSPADM

## 2023-09-20 RX ORDER — ACETAMINOPHEN 650 MG/1
325 SUPPOSITORY RECTAL EVERY 4 HOURS PRN
Status: DISCONTINUED | OUTPATIENT
Start: 2023-09-20 | End: 2023-09-21 | Stop reason: HOSPADM

## 2023-09-20 RX ORDER — EPHEDRINE SULFATE 5 MG/ML
5 INJECTION INTRAVENOUS ONCE AS NEEDED
Status: DISCONTINUED | OUTPATIENT
Start: 2023-09-20 | End: 2023-09-20 | Stop reason: HOSPADM

## 2023-09-20 RX ORDER — DIPHENHYDRAMINE HYDROCHLORIDE 50 MG/ML
12.5 INJECTION INTRAMUSCULAR; INTRAVENOUS ONCE AS NEEDED
Status: DISCONTINUED | OUTPATIENT
Start: 2023-09-20 | End: 2023-09-20 | Stop reason: HOSPADM

## 2023-09-20 RX ORDER — LABETALOL HYDROCHLORIDE 5 MG/ML
5 INJECTION, SOLUTION INTRAVENOUS
Status: DISCONTINUED | OUTPATIENT
Start: 2023-09-20 | End: 2023-09-20 | Stop reason: HOSPADM

## 2023-09-20 RX ORDER — SODIUM CHLORIDE 9 MG/ML
INJECTION, SOLUTION INTRAVENOUS AS NEEDED
Status: DISCONTINUED | OUTPATIENT
Start: 2023-09-20 | End: 2023-09-20 | Stop reason: HOSPADM

## 2023-09-20 RX ORDER — FLUOXETINE HYDROCHLORIDE 20 MG/1
20 CAPSULE ORAL DAILY
Status: DISCONTINUED | OUTPATIENT
Start: 2023-09-20 | End: 2023-09-20 | Stop reason: HOSPADM

## 2023-09-20 RX ORDER — OXYCODONE HCL 10 MG/1
10 TABLET, FILM COATED, EXTENDED RELEASE ORAL EVERY 12 HOURS SCHEDULED
Status: DISCONTINUED | OUTPATIENT
Start: 2023-09-20 | End: 2023-09-20 | Stop reason: HOSPADM

## 2023-09-20 RX ORDER — FLUMAZENIL 0.1 MG/ML
0.2 INJECTION INTRAVENOUS AS NEEDED
Status: DISCONTINUED | OUTPATIENT
Start: 2023-09-20 | End: 2023-09-20 | Stop reason: HOSPADM

## 2023-09-20 RX ADMIN — MIDAZOLAM 2 MG: 1 INJECTION INTRAMUSCULAR; INTRAVENOUS at 10:57

## 2023-09-20 RX ADMIN — BUPIVACAINE HYDROCHLORIDE 10 ML: 5 INJECTION, SOLUTION EPIDURAL; INTRACAUDAL; PERINEURAL at 10:57

## 2023-09-20 RX ADMIN — BUPIVACAINE 10 ML: 13.3 INJECTION, SUSPENSION, LIPOSOMAL INFILTRATION at 10:57

## 2023-09-20 RX ADMIN — LIDOCAINE HYDROCHLORIDE 100 MG: 20 INJECTION, SOLUTION EPIDURAL; INFILTRATION; INTRACAUDAL; PERINEURAL at 12:16

## 2023-09-20 RX ADMIN — EPHEDRINE SULFATE 10 MG: 5 INJECTION INTRAVENOUS at 12:38

## 2023-09-20 RX ADMIN — CEFAZOLIN 2000 MG: 2 INJECTION, POWDER, FOR SOLUTION INTRAMUSCULAR; INTRAVENOUS at 12:25

## 2023-09-20 RX ADMIN — OXYCODONE HYDROCHLORIDE 10 MG: 10 TABLET, FILM COATED, EXTENDED RELEASE ORAL at 10:06

## 2023-09-20 RX ADMIN — SUGAMMADEX 200 MG: 100 INJECTION, SOLUTION INTRAVENOUS at 13:39

## 2023-09-20 RX ADMIN — ROCURONIUM BROMIDE 50 MG: 10 INJECTION, SOLUTION INTRAVENOUS at 12:16

## 2023-09-20 RX ADMIN — FENTANYL CITRATE 100 MCG: 50 INJECTION, SOLUTION INTRAMUSCULAR; INTRAVENOUS at 10:57

## 2023-09-20 RX ADMIN — PROPOFOL 200 MG: 10 INJECTION, EMULSION INTRAVENOUS at 12:16

## 2023-09-20 RX ADMIN — DEXAMETHASONE SODIUM PHOSPHATE 4 MG: 4 INJECTION, SOLUTION INTRAMUSCULAR; INTRAVENOUS at 10:57

## 2023-09-20 RX ADMIN — TRANEXAMIC ACID 1000 MG: 10 INJECTION, SOLUTION INTRAVENOUS at 12:35

## 2023-09-20 RX ADMIN — PREGABALIN 150 MG: 75 CAPSULE ORAL at 10:07

## 2023-09-20 RX ADMIN — ONDANSETRON 4 MG: 2 INJECTION INTRAMUSCULAR; INTRAVENOUS at 13:30

## 2023-09-20 RX ADMIN — EPHEDRINE SULFATE 10 MG: 5 INJECTION INTRAVENOUS at 13:02

## 2023-09-20 RX ADMIN — SODIUM CHLORIDE, POTASSIUM CHLORIDE, SODIUM LACTATE AND CALCIUM CHLORIDE 1000 ML: 600; 310; 30; 20 INJECTION, SOLUTION INTRAVENOUS at 10:05

## 2023-09-20 RX ADMIN — PROPOFOL INJECTABLE EMULSION 150 MCG/KG/MIN: 10 INJECTION, EMULSION INTRAVENOUS at 12:22

## 2023-09-20 RX ADMIN — DEXAMETHASONE SODIUM PHOSPHATE 8 MG: 4 INJECTION, SOLUTION INTRAMUSCULAR; INTRAVENOUS at 12:30

## 2023-09-20 RX ADMIN — SODIUM CHLORIDE, POTASSIUM CHLORIDE, SODIUM LACTATE AND CALCIUM CHLORIDE: 600; 310; 30; 20 INJECTION, SOLUTION INTRAVENOUS at 13:36

## 2023-09-20 RX ADMIN — MELOXICAM 15 MG: 15 TABLET ORAL at 10:06

## 2023-09-20 NOTE — THERAPY RE-EVALUATION
Patient Name: Adela oDwell  : 1955    MRN: [id number]                              Today's Date: 2023       Admit Date: 2023    Visit Dx:     ICD-10-CM ICD-9-CM   1. Osteoarthritis of left glenohumeral joint  M19.012 715.91     Patient Active Problem List   Diagnosis    Hypertension    Disorder of bursae and tendons in shoulder region    Chronic headaches    Arthritis    Anemia    H/O splenectomy    Osteopenia of lumbar spine    Depressive disorder    Class 1 obesity due to excess calories with body mass index (BMI) of 32.0 to 32.9 in adult    History of kidney stones    Osteoarthritis of left glenohumeral joint    DJD of left shoulder     Past Medical History:   Diagnosis Date    Anxiety     Arthritis     Depression     Headache     Hypertension     Kidney stone     Low back pain     Obesity     Presence of left artificial knee joint     Slow to wake up after anesthesia     Urinary tract infection      Past Surgical History:   Procedure Laterality Date    JOINT REPLACEMENT  2019    KIDNEY STONE SURGERY      ROTATOR CUFF REPAIR Right 2011    x2    SINUS SURGERY Bilateral 2000    SPLENECTOMY      TUBAL ABDOMINAL LIGATION        General Information       Row Name 23 175          OT Time and Intention    Document Type re-evaluation  -MP     Mode of Treatment individual therapy  -MP       Row Name 23 175          General Information    Patient Profile Reviewed yes  -MP       Row Name 23          Cognition    Orientation Status (Cognition) oriented x 3  -MP       Row Name 23          Safety Issues, Functional Mobility    Impairments Affecting Function (Mobility) strength;range of motion (ROM)  -MP               User Key  (r) = Recorded By, (t) = Taken By, (c) = Cosigned By      Initials Name Provider Type    MP Thomas Cabral OT Occupational Therapist                     Mobility/ADL's       Row Name 23 175          Bed Mobility    Bed Mobility bed  mobility (all) activities  -MP     All Activities, Walton (Bed Mobility) supervision  -MP       Row Name 09/20/23 1752          Activities of Daily Living    BADL Assessment/Intervention upper body dressing  -MP       Row Name 09/20/23 1752          Lower Body Dressing Assessment/Training    Walton Level (Lower Body Dressing) moderate assist (50% patient effort)  -MP               User Key  (r) = Recorded By, (t) = Taken By, (c) = Cosigned By      Initials Name Provider Type    Thomas Dunne OT Occupational Therapist                   Obj/Interventions       Row Name 09/20/23 1752          Range of Motion Comprehensive    Comment, General Range of Motion RUE WFL  -MP       Row Name 09/20/23 1752          Strength Comprehensive (MMT)    Comment, General Manual Muscle Testing (MMT) Assessment RUE WFL  -       Row Name 09/20/23 1752          Balance    Balance Interventions sitting  -MP               User Key  (r) = Recorded By, (t) = Taken By, (c) = Cosigned By      Initials Name Provider Type    Thomas Dunne OT Occupational Therapist                   Goals/Plan       Row Name 09/20/23 1754          Bed Mobility Goal 1 (OT)    Activity/Assistive Device (Bed Mobility Goal 1, OT) bed mobility activities, all  -MP     Walton Level/Cues Needed (Bed Mobility Goal 1, OT) independent  -MP     Time Frame (Bed Mobility Goal 1, OT) long term goal (LTG);2 weeks  -       Row Name 09/20/23 1754          Transfer Goal 1 (OT)    Activity/Assistive Device (Transfer Goal 1, OT) transfers, all  -MP     Walton Level/Cues Needed (Transfer Goal 1, OT) independent  -MP     Time Frame (Transfer Goal 1, OT) long term goal (LTG);2 weeks  -       Row Name 09/20/23 1754          Dressing Goal 1 (OT)    Activity/Device (Dressing Goal 1, OT) upper body dressing  -MP     Walton/Cues Needed (Dressing Goal 1, OT) supervision required;set-up required  -MP     Time Frame (Dressing Goal 1, OT)  long term goal (LTG);2 weeks  -MP               User Key  (r) = Recorded By, (t) = Taken By, (c) = Cosigned By      Initials Name Provider Type    Thomas Dunne OT Occupational Therapist                   Clinical Impression       Row Name 09/20/23 1753          Pain Assessment    Pretreatment Pain Rating 0/10 - no pain  -MP     Posttreatment Pain Rating 0/10 - no pain  -MP       Row Name 09/20/23 1753          Plan of Care Review    Plan of Care Reviewed With patient  -MP     Progress no change  -MP     Outcome Evaluation Pt. reeevaluated this date POD # 0 LUE rTSA. Pt. seen in PACU, limited mobility and supervision for supine to sit. Pt.educated on hemidressing technique w/ moderate A provided for donning/doffing sling and ice pack.Reviewed all rTSA precautions w/ patient. Will intiate HEP 9/21 per Dr Ward orders.. Pt. safe for d/c home w/ HH vs OP therapy to advance rTSA protocol and maximize post op benefits.  -MP       Row Name 09/20/23 1753          Therapy Assessment/Plan (OT)    Rehab Potential (OT) good, to achieve stated therapy goals  -MP     Criteria for Skilled Therapeutic Interventions Met (OT) yes;meets criteria;skilled treatment is necessary  -MP     Therapy Frequency (OT) 5 times/wk  -MP       Row Name 09/20/23 1753          Therapy Plan Review/Discharge Plan (OT)    Anticipated Discharge Disposition (OT) home with assist;home with outpatient therapy services  -MP       Row Name 09/20/23 1753          Vital Signs    Pre Patient Position Supine  -MP     Intra Patient Position Sitting  -MP     Post Patient Position Supine  -MP       Row Name 09/20/23 1753          Positioning and Restraints    Pre-Treatment Position in bed  -MP     Post Treatment Position bed  -MP     In Bed supine;call light within reach;encouraged to call for assist  -MP               User Key  (r) = Recorded By, (t) = Taken By, (c) = Cosigned By      Initials Name Provider Type    Thomas Dunne OT Occupational  Therapist                   Outcome Measures    No documentation.                   Occupational Therapy Education       Title: PT OT SLP Therapies (In Progress)       Topic: Occupational Therapy (In Progress)       Point: ADL training (Not Started)       Description:   Instruct learner(s) on proper safety adaptation and remediation techniques during self care or transfers.   Instruct in proper use of assistive devices.                  Learner Progress:  Not documented in this visit.              Point: Home exercise program (Not Started)       Description:   Instruct learner(s) on appropriate technique for monitoring, assisting and/or progressing therapeutic exercises/activities.                  Learner Progress:  Not documented in this visit.              Point: Precautions (Not Started)       Description:   Instruct learner(s) on prescribed precautions during self-care and functional transfers.                  Learner Progress:  Not documented in this visit.              Point: Body mechanics (Done)       Description:   Instruct learner(s) on proper positioning and spine alignment during self-care, functional mobility activities and/or exercises.                  Learning Progress Summary             Patient Acceptance, E,TB, VU by HERMILO at 9/20/2023 9255                                         User Key       Initials Effective Dates Name Provider Type Discipline    HERMILO 06/16/21 -  Thomas Cabral OT Occupational Therapist OT                  OT Recommendation and Plan  Therapy Frequency (OT): 5 times/wk  Plan of Care Review  Plan of Care Reviewed With: patient  Progress: no change  Outcome Evaluation: Pt. reeevaluated this date POD # 0 LUE rTSA. Pt. seen in PACU, limited mobility and supervision for supine to sit. Pt.educated on hemidressing technique w/ moderate A provided for donning/doffing sling and ice pack.Reviewed all rTSA precautions w/ patient. Will intiate HEP 9/21 per Dr Ward orders.. Pt. safe  for d/c home w/ HH vs OP therapy to advance rTSA protocol and maximize post op benefits.     Time Calculation:         Time Calculation- OT       Row Name 09/20/23 1755             Time Calculation- OT    OT Start Time 1605  -MP      OT Stop Time 1618  -MP      OT Time Calculation (min) 13 min  -      Total Timed Code Minutes- OT 0 minute(s)  -      OT Received On 09/20/23  -      OT - Next Appointment 09/21/23  -      OT Goal Re-Cert Due Date 10/04/23  -                User Key  (r) = Recorded By, (t) = Taken By, (c) = Cosigned By      Initials Name Provider Type    Thomas Dunne OT Occupational Therapist                  Therapy Charges for Today       Code Description Service Date Service Provider Modifiers Qty    [id number] HC OT RE-EVAL 2 9/20/2023 Thomas Cabral OT GO 1                 Thomas Cabral OT  9/20/2023

## 2023-09-20 NOTE — ANESTHESIA PROCEDURE NOTES
Peripheral Block    Pre-sedation assessment completed: 9/20/2023 10:57 AM    Patient reassessed immediately prior to procedure    Patient location during procedure: pre-op  Start time: 9/20/2023 10:57 AM  Stop time: 9/20/2023 11:01 AM  Reason for block: at surgeon's request and post-op pain management  Performed by  Anesthesiologist: Camden Villarreal MD  Preanesthetic Checklist  Completed: patient identified, IV checked, site marked, risks and benefits discussed, surgical consent, monitors and equipment checked, pre-op evaluation and timeout performed  Prep:  Pt Position: supine  Sterile barriers:cap, gloves, mask and washed/disinfected hands  Prep: ChloraPrep  Patient monitoring: blood pressure monitoring, continuous pulse oximetry and EKG  Procedure    Sedation: yes    Guidance:ultrasound guided    ULTRASOUND INTERPRETATION.  Using ultrasound guidance a 20 G gauge needle was placed in close proximity to the nerve, at which point, under ultrasound guidance anesthetic was injected in the area of the nerve and spread of the anesthesia was seen on ultrasound in close proximity thereto.  There were no abnormalities seen on ultrasound; a digital image was taken; and the patient tolerated the procedure with no complications. Images:still images obtained, printed/placed on chart    Laterality:left  Block Type:interscalene  Injection Technique:single-shot  Needle Type:echogenic  Needle Gauge:20 G  Resistance on Injection: none  Sedation medications used: midazolam (VERSED) injection - Intravenous   2 mg - 9/20/2023 10:57:00 AM  fentaNYL citrate (PF) (SUBLIMAZE) injection - Intravenous   100 mcg - 9/20/2023 10:57:00 AM  Medications Used: dexamethasone (DECADRON) injection - Injection   4 mg - 9/20/2023 10:57:00 AM  bupivacaine liposome (EXPAREL) injection 1.3% - Perineural   10 mL - 9/20/2023 10:57:00 AM  bupivacaine PF (MARCAINE) injection 0.5% - Perineural   10 mL - 9/20/2023 10:57:00 AM      Post Assessment  Injection  Assessment: negative aspiration for heme, no paresthesia on injection and incremental injection  Patient Tolerance:comfortable throughout block  Complications:no

## 2023-09-20 NOTE — DISCHARGE PLACEMENT REQUEST
"Adela Greenfield (68 y.o. Female)       Date of Birth   1955    Social Security Number       Address   [address] [place] [address] [place] [facility]    Home Phone   [phone]    MRN   [id number]       M[employer]onism   [employer]    Marital Status                               Admission Date   9/20/23    Admission Type   Elective    Admitting Provider   Aureliano Roberts MD    Attending Provider   Aureliano Roberts MD    Department, Room/Bed   [facility] MAIN OR, [facility] MAIN OR/MAIN OR       Discharge Date       Discharge Disposition       Discharge Destination                                 Attending Provider: Aureliano Roberts MD    Allergies: Levofloxacin    Isolation: None   Infection: None   Code Status: Not on file    Ht: 170.2 cm (67\")   Wt: 86.4 kg (190 lb 6.4 oz)    Admission Cmt: None   Principal Problem: Osteoarthritis of left glenohumeral joint [M19.012]                   Active Insurance as of 9/20/2023       Primary Coverage       Payor Plan Insurance Group Employer/Plan Group    MEDICARE MEDICARE A & B        Payor Plan Address Payor Plan Phone Number Payor Plan Fax Number Effective Dates    [address] [phone]  4/1/2022 - None Entered    [facility] [id number]         Subscriber Name Subscriber Birth Date Member ID       ADELA GREENFIELD 1955 [id number]               Secondary Coverage       Payor Plan Insurance Group Employer/Plan Group    [place] [id number]       Payor Plan Address Payor Plan Phone Number Payor Plan Fax Number Effective Dates    [address]   1/1/2016 - None Entered    [facility] [id number]         Subscriber Name Subscriber Birth Date Member ID       GINANTONIO 12/28/1953 [id number]                     Emergency Contacts        (Rel.) Home Phone Work Phone Mobile Phone    ANTONIO GREENFIELD \"BOUBACAR\" (Spouse) -- -- [phone]                "

## 2023-09-20 NOTE — ANESTHESIA POSTPROCEDURE EVALUATION
Patient: Adela Dowell    Procedure Summary       Date: 09/20/23 Room / Location: [facility] OR 11 / [facility] MAIN OR    Anesthesia Start: 1211 Anesthesia Stop: 1353    Procedure: TOTAL SHOULDER REVERSE ARTHROPLASTY (Left: Shoulder) Diagnosis:       Osteoarthritis of left glenohumeral joint      (Osteoarthritis of left glenohumeral joint [M19.012])    Surgeons: Aureliano Roberts MD Provider: Camden Villarreal MD    Anesthesia Type: general with block, ERAS Protocol ASA Status: 2            Anesthesia Type: general with block, ERAS Protocol    Vitals  Vitals Value Taken Time   /68 09/20/23 1402   Temp 96.7 °F (35.9 °C) 09/20/23 1350   Pulse 76 09/20/23 1406   Resp 14 09/20/23 1405   SpO2 95 % 09/20/23 1406   Vitals shown include unvalidated device data.        Post Anesthesia Care and Evaluation    Patient location during evaluation: PACU  Patient participation: complete - patient participated  Level of consciousness: awake  Pain scale: See nurse's notes for pain score.  Pain management: adequate    Airway patency: patent  Anesthetic complications: No anesthetic complications  PONV Status: none  Cardiovascular status: acceptable  Respiratory status: acceptable and spontaneous ventilation  Hydration status: acceptable    Comments: Patient seen and examined postoperatively; vital signs stable; SpO2 greater than or equal to 90%; cardiopulmonary status stable; nausea/vomiting adequately controlled; pain adequately controlled; no apparent anesthesia complications; patient discharged from anesthesia care when discharge criteria were met

## 2023-09-20 NOTE — PLAN OF CARE
Goal Outcome Evaluation:  Plan of Care Reviewed With: patient        Progress: no change  Outcome Evaluation: Pt. reeevaluated this date POD # 0 LUE rTSA. Pt. seen in PACU, limited mobility and supervision for supine to sit. Pt.educated on hemidressing technique w/ moderate A provided for donning/doffing sling and ice pack.Reviewed all rTSA precautions w/ patient. Will intiate HEP 9/21 per Dr Ward orders.. Pt. safe for d/c home w/ HH vs OP therapy to advance rTSA protocol and maximize post op benefits.      Anticipated Discharge Disposition (OT): home with assist, home with outpatient therapy services

## 2023-09-20 NOTE — ANESTHESIA PREPROCEDURE EVALUATION
Anesthesia Evaluation     NPO Solid Status: > 8 hours  NPO Liquid Status: > 8 hours           Airway   Mallampati: I  TM distance: >3 FB  Neck ROM: full  No difficulty expected  Dental - normal exam     Pulmonary - normal exam   Cardiovascular - normal exam    (+) hypertension well controlled      Neuro/Psych  (+) headaches, psychiatric history  GI/Hepatic/Renal/Endo    (+) renal disease stones    Musculoskeletal     Abdominal  - normal exam    Bowel sounds: normal.   Substance History      OB/GYN          Other   arthritis,                     Anesthesia Plan    ASA 2     general with block and ERAS Protocol   total IV anesthesia  intravenous induction     Anesthetic plan, risks, benefits, and alternatives have been provided, discussed and informed consent has been obtained with: patient.  Pre-procedure education provided  Plan discussed with CRNA.      CODE STATUS:

## 2023-09-20 NOTE — ANESTHESIA PROCEDURE NOTES
Airway  Urgency: elective    Date/Time: 9/20/2023 12:22 PM  Airway not difficult    General Information and Staff    Patient location during procedure: OR  Anesthesiologist: Camden Villarreal MD  CRNA/CAA: Yenni Costa CRNA    Indications and Patient Condition  Indications for airway management: airway protection    Preoxygenated: yes (pt pre-O2 with 100% O2)  Mask difficulty assessment: 2 - vent by mask + OA or adjuvant +/- NMBA (easy BMV )    Final Airway Details  Final airway type: endotracheal airway      Successful airway: ETT  Cuffed: yes   Successful intubation technique: direct laryngoscopy  Facilitating devices/methods: anterior pressure/BURP  Endotracheal tube insertion site: oral  Blade: Jasiel  Blade size: 3  ETT size (mm): 7.0  Cormack-Lehane Classification: grade IIa - partial view of glottis  Placement verified by: chest auscultation and capnometry   Cuff volume (mL): 7  Measured from: lips  ETT/EBT  to lips (cm): 21  Number of attempts at approach: 1  Assessment: lips, teeth, and gum same as pre-op and atraumatic intubation    Additional Comments  ATOETx1. No change in dentition.

## 2023-09-20 NOTE — OP NOTE
TOTAL SHOULDER REVERSE ARTHROPLASTY  Procedure Report    Patient Name:  Adela Dowell  YOB: 1955    Date of Surgery:  9/20/2023     Indications: This is a 68 y.o. female with left shoulder pain.  Imaging demonstrated degenerative joint disease.  Treatment options were discussed.  They desired to proceed with reverse shoulder arthroplasty after discussing the risks including bleeding, scarring, infection, stiffness, nerve damage, tendon damage, artery damage, continued  pain, DVT, loss of life or limb, fracture, dislocation, and a need for further surgery.      Pre-op Diagnosis:   Osteoarthritis of left glenohumeral joint [M19.012]       Post-op Diagnosis:    Post-Op Diagnosis Codes:     * Osteoarthritis of left glenohumeral joint [M19.012]    Procedure/CPT® Codes: 96020    Procedure(s): Left  TOTAL SHOULDER REVERSE ARTHROPLASTY    Staff: Pernell Patiño physician assistant    was responsible for performing the following activities: Retraction, Suction, Irrigation, Suturing, Closing, and Placing Dressing and their skilled assistance was necessary for the success of this case.       Anesthesia: General with Block    Estimated Blood Loss: 100ml    Implants:    Implant Name Type Inv. Item Serial No.  Lot No. LRB No. Used Action   SUT NONABS MAXBRAID/PE NMBR2 HC5 38IN LEONARDO [id number] - [id number] Implant SUT NONABS MAXBRAID/PE NMBR2 HC5 38IN LEONARDO [id number]  [employer] [id number] Left 1 Implanted   SUT NONABS MAXBRAID/PE NMBR2 HC5 38IN LEONARDO [id number] - [id number] Implant SUT NONABS MAXBRAID/PE NMBR2 HC5 38IN LEONARDO [id number]  [employer] [id number] Left 1 Implanted   BASEPLT SALINA TRABECULARMETAL REV 15MM - [id number] Implant BASEPLT SALINA TRABECULARMETAL REV 15MM  [employer] [id number] Left 1 Implanted   GLENSPHR TRABECULARMETAL REV 36MM - [id number] Implant GLENSPHR TRABECULARMETAL REV 36MM  [employer] [id number] Left 1 Implanted   SCRW CANC INV/REV 4.5X36MM - [id number] Implant SCRW CANC INV/REV 4.5X36MM   [employer] [id number] Left 1 Implanted   SCRW CANC INV/REV 4.5X36MM - [id number] Implant SCRW CANC INV/REV 4.5X36MM  [employer] [id number] Left 1 Implanted   STEM HUM REV NONPOR [id number] - [id number] Implant STEM HUM REV NONPOR [id number]  [employer] [id number] Left 1 Implanted   LINER HUM REV TRABECULAR REV PA 7D 36 PL - [id number] Implant LINER HUM REV TRABECULAR REV PA 7D 36 PL  ART [employer] [id number] Left 1 Implanted       IVF: see anesthesia      Complications: None    Specimens:none    Description of Procedure: The patient's operative site was marked in the  preoperative holding area.  They received regional anesthesia and were brought to  the operating room and placed supine on a well-padded operating room table.  General anesthesia was administered.  Antibiotics were dosed.  A timeout was  taken, confirming the correct operative site and procedure.  They were placed in  the beach chair position.  The left shoulder was prepped and draped in the  standard surgical fashion.  SCDs were placed. A deltopectoral incision was marked and injected with local anesthetic.  The skin was opened.  Flaps were raised.  The interval was opened and the cephalic vein was protected.  Adhesions were released from the deltoid.  The circumflex vessels were identified and ligated with suture and cautery.  The rotator interval was opened and a retractor was placed.  The subscapularis was  Tenotomized and the capsule was released down to the 6 o'clock position on the  humeral head protecting the axillary nerve.  A Fukuda retractor was placed and an inferior and anterior capsular release was performed palpating and protecting the axillary  nerve with my finger at all times.  The shoulder was dislocated.  The biceps  was tenodesed to the upper biceps groove and circumferential osteophytes  were removed to identify the native head-neck junction.  Reaming was started  behind the biceps groove up to a size 13.  The cut was made in  20  degrees of retroversion and the final two reamers were used to prepare the  proximal humerus.  A trial was placed.  The glenoid was exposed after placing retractors.  The soft tissue was removed circumferentially.  The center point was marked and the glenoid was prepared in standard fashion.  The base plate was placed with good press-fit.  Two screws were placed with good purchase.  The locking caps were  placed.  The glenosphere was placed with good purchase.  The shoulder was  dislocated and the trial was removed from the canal and irrigated.  The true  stem was placed with good press-fit and trialing demonstrated +6 thickness to offer the best restoration of joint stability, muscle tension and range of motion.  The true polyethylene was placed with similar range of motion and stability.  A 3-minute Betadine wash performed.  The wound was closed in layers with absorbable suture and skin glue.  A sterile dressing and sling were applied.  They were awakened and taken to the recovery room.  There were no complications.  I was present for all portions.  All counts were correct.   Good capillary refill was noted to the hand.      Aureliano Roberts MD     Date: 9/20/2023  Time: 13:38 EDT

## 2023-09-21 ENCOUNTER — READMISSION MANAGEMENT (OUTPATIENT)
Dept: CALL CENTER | Facility: HOSPITAL | Age: 68
End: 2023-09-21
Payer: MEDICARE

## 2023-09-21 VITALS
BODY MASS INDEX: 29.88 KG/M2 | HEIGHT: 67 IN | HEART RATE: 77 BPM | DIASTOLIC BLOOD PRESSURE: 80 MMHG | RESPIRATION RATE: 18 BRPM | WEIGHT: 190.4 LBS | TEMPERATURE: 98.2 F | SYSTOLIC BLOOD PRESSURE: 135 MMHG | OXYGEN SATURATION: 95 %

## 2023-09-21 LAB
ANION GAP SERPL CALCULATED.3IONS-SCNC: 10 MMOL/L (ref 5–15)
BUN SERPL-MCNC: 17 MG/DL (ref 8–23)
BUN/CREAT SERPL: 19.8 (ref 7–25)
CALCIUM SPEC-SCNC: 9 MG/DL (ref 8.6–10.5)
CHLORIDE SERPL-SCNC: 100 MMOL/L (ref 98–107)
CO2 SERPL-SCNC: 28 MMOL/L (ref 22–29)
CREAT SERPL-MCNC: 0.86 MG/DL (ref 0.57–1)
EGFRCR SERPLBLD CKD-EPI 2021: 74.6 ML/MIN/1.73
GLUCOSE SERPL-MCNC: 141 MG/DL (ref 65–99)
HCT VFR BLD AUTO: 40.6 % (ref 34–46.6)
HGB BLD-MCNC: 13.7 G/DL (ref 12–15.9)
POTASSIUM SERPL-SCNC: 4.8 MMOL/L (ref 3.5–5.2)
SODIUM SERPL-SCNC: 138 MMOL/L (ref 136–145)

## 2023-09-21 PROCEDURE — 80048 BASIC METABOLIC PNL TOTAL CA: CPT | Performed by: ORTHOPAEDIC SURGERY

## 2023-09-21 PROCEDURE — 25010000002 CEFAZOLIN PER 500 MG: Performed by: ORTHOPAEDIC SURGERY

## 2023-09-21 PROCEDURE — 97161 PT EVAL LOW COMPLEX 20 MIN: CPT

## 2023-09-21 PROCEDURE — 85018 HEMOGLOBIN: CPT | Performed by: ORTHOPAEDIC SURGERY

## 2023-09-21 PROCEDURE — 97535 SELF CARE MNGMENT TRAINING: CPT

## 2023-09-21 PROCEDURE — G0378 HOSPITAL OBSERVATION PER HR: HCPCS

## 2023-09-21 PROCEDURE — 97110 THERAPEUTIC EXERCISES: CPT

## 2023-09-21 PROCEDURE — 85014 HEMATOCRIT: CPT | Performed by: ORTHOPAEDIC SURGERY

## 2023-09-21 RX ORDER — SODIUM CHLORIDE 9 MG/ML
75 INJECTION, SOLUTION INTRAVENOUS CONTINUOUS
Status: DISCONTINUED | OUTPATIENT
Start: 2023-09-21 | End: 2023-09-21 | Stop reason: HOSPADM

## 2023-09-21 RX ORDER — OXYCODONE HYDROCHLORIDE 5 MG/1
10 TABLET ORAL EVERY 4 HOURS PRN
Status: DISCONTINUED | OUTPATIENT
Start: 2023-09-21 | End: 2023-09-21 | Stop reason: HOSPADM

## 2023-09-21 RX ORDER — ONDANSETRON 4 MG/1
4 TABLET, FILM COATED ORAL EVERY 6 HOURS PRN
Status: DISCONTINUED | OUTPATIENT
Start: 2023-09-21 | End: 2023-09-21 | Stop reason: HOSPADM

## 2023-09-21 RX ORDER — ONDANSETRON 2 MG/ML
4 INJECTION INTRAMUSCULAR; INTRAVENOUS EVERY 6 HOURS PRN
Status: DISCONTINUED | OUTPATIENT
Start: 2023-09-21 | End: 2023-09-21 | Stop reason: HOSPADM

## 2023-09-21 RX ORDER — DIPHENHYDRAMINE HCL 25 MG
25 CAPSULE ORAL EVERY 6 HOURS PRN
Status: DISCONTINUED | OUTPATIENT
Start: 2023-09-21 | End: 2023-09-21 | Stop reason: HOSPADM

## 2023-09-21 RX ORDER — MORPHINE SULFATE 2 MG/ML
2 INJECTION, SOLUTION INTRAMUSCULAR; INTRAVENOUS EVERY 4 HOURS PRN
Status: DISCONTINUED | OUTPATIENT
Start: 2023-09-21 | End: 2023-09-21 | Stop reason: HOSPADM

## 2023-09-21 RX ORDER — OXYCODONE HYDROCHLORIDE AND ACETAMINOPHEN 5; 325 MG/1; MG/1
1 TABLET ORAL EVERY 6 HOURS PRN
Qty: 28 TABLET | Refills: 0 | Status: SHIPPED | OUTPATIENT
Start: 2023-09-21

## 2023-09-21 RX ORDER — DIPHENHYDRAMINE HCL 25 MG
25 CAPSULE ORAL NIGHTLY PRN
Status: DISCONTINUED | OUTPATIENT
Start: 2023-09-21 | End: 2023-09-21 | Stop reason: HOSPADM

## 2023-09-21 RX ORDER — PROMETHAZINE HYDROCHLORIDE 12.5 MG/1
12.5 TABLET ORAL EVERY 6 HOURS PRN
Qty: 21 TABLET | Refills: 1 | Status: SHIPPED | OUTPATIENT
Start: 2023-09-21

## 2023-09-21 RX ORDER — DIPHENHYDRAMINE HYDROCHLORIDE 50 MG/ML
25 INJECTION INTRAMUSCULAR; INTRAVENOUS NIGHTLY PRN
Status: DISCONTINUED | OUTPATIENT
Start: 2023-09-21 | End: 2023-09-21 | Stop reason: HOSPADM

## 2023-09-21 RX ORDER — MELOXICAM 15 MG/1
15 TABLET ORAL DAILY
Status: DISCONTINUED | OUTPATIENT
Start: 2023-09-21 | End: 2023-09-21 | Stop reason: HOSPADM

## 2023-09-21 RX ORDER — ACETAMINOPHEN 650 MG/1
650 SUPPOSITORY RECTAL EVERY 4 HOURS PRN
Status: DISCONTINUED | OUTPATIENT
Start: 2023-09-21 | End: 2023-09-21 | Stop reason: HOSPADM

## 2023-09-21 RX ORDER — BISACODYL 10 MG
10 SUPPOSITORY, RECTAL RECTAL DAILY PRN
Status: DISCONTINUED | OUTPATIENT
Start: 2023-09-21 | End: 2023-09-21 | Stop reason: HOSPADM

## 2023-09-21 RX ORDER — NALOXONE HCL 0.4 MG/ML
0.4 VIAL (ML) INJECTION
Status: DISCONTINUED | OUTPATIENT
Start: 2023-09-21 | End: 2023-09-21 | Stop reason: HOSPADM

## 2023-09-21 RX ORDER — ACETAMINOPHEN 325 MG/1
650 TABLET ORAL EVERY 4 HOURS PRN
Status: DISCONTINUED | OUTPATIENT
Start: 2023-09-21 | End: 2023-09-21 | Stop reason: HOSPADM

## 2023-09-21 RX ORDER — TRAMADOL HYDROCHLORIDE 50 MG/1
50 TABLET ORAL EVERY 6 HOURS PRN
Status: DISCONTINUED | OUTPATIENT
Start: 2023-09-21 | End: 2023-09-21 | Stop reason: HOSPADM

## 2023-09-21 RX ORDER — ASPIRIN 325 MG
325 TABLET, DELAYED RELEASE (ENTERIC COATED) ORAL EVERY 12 HOURS SCHEDULED
Status: DISCONTINUED | OUTPATIENT
Start: 2023-09-21 | End: 2023-09-21 | Stop reason: HOSPADM

## 2023-09-21 RX ADMIN — SODIUM CHLORIDE 75 ML/HR: 9 INJECTION, SOLUTION INTRAVENOUS at 02:40

## 2023-09-21 RX ADMIN — CEFAZOLIN 2 G: 2 INJECTION, POWDER, FOR SOLUTION INTRAMUSCULAR; INTRAVENOUS at 02:40

## 2023-09-21 RX ADMIN — ASPIRIN 325 MG: 325 TABLET, COATED ORAL at 08:18

## 2023-09-21 RX ADMIN — MELOXICAM 15 MG: 15 TABLET ORAL at 08:18

## 2023-09-21 RX ADMIN — TRAMADOL HYDROCHLORIDE 50 MG: 50 TABLET, COATED ORAL at 02:48

## 2023-09-21 NOTE — PLAN OF CARE
Goal Outcome Evaluation:  Plan of Care Reviewed With: patient        Progress: no change  Outcome Evaluation: Pt is a 69 y/o F POD #1 left reverse TSA following an injury w/ horse accident in February 2023. Pt w/ hx of anxiety/depression, OA, HTN, LBP, L TKA, and UTIs. Prior to horse accident (horse trampled pt in Feb. 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 Pt lives w/  in [facility] w/ 0 SUKHI. During eval, pt demonstrates MOD I with supine to sit transfer, independent w/ bed mobility, and independent w/ gait training 400 ft w/ no AD w/ no LOB/unsteadiness. Anticipate safe return home w/  vs. OP PT vs. HH PT for shoulder rehab but w/ no skilled acute care PT needs at this time. PPE: gloves      Anticipated Discharge Disposition (PT): home with home health, home with outpatient therapy services

## 2023-09-21 NOTE — THERAPY EVALUATION
Patient Name: Adela Dowell  : 1955    MRN: [id number]                              Today's Date: 2023       Admit Date: 2023    Visit Dx:     ICD-10-CM ICD-9-CM   1. Primary osteoarthritis of left shoulder  M19.012 715.11   2. Osteoarthritis of left glenohumeral joint  M19.012 715.91     Patient Active Problem List   Diagnosis    Hypertension    Disorder of bursae and tendons in shoulder region    Chronic headaches    Arthritis    Anemia    H/O splenectomy    Osteopenia of lumbar spine    Depressive disorder    Class 1 obesity due to excess calories with body mass index (BMI) of 32.0 to 32.9 in adult    History of kidney stones    Osteoarthritis of left glenohumeral joint    DJD of left shoulder     Past Medical History:   Diagnosis Date    Anxiety     Arthritis     Depression     Headache     Hypertension     Kidney stone     Low back pain     Obesity     Presence of left artificial knee joint     Slow to wake up after anesthesia     Urinary tract infection      Past Surgical History:   Procedure Laterality Date    JOINT REPLACEMENT      KIDNEY STONE SURGERY      ROTATOR CUFF REPAIR Right 2011    x2    SINUS SURGERY Bilateral     SPLENECTOMY      TUBAL ABDOMINAL LIGATION        General Information       Row Name 23 0830          Physical Therapy Time and Intention    Document Type evaluation  -AO     Mode of Treatment physical therapy  -AO       Row Name 23 0830          General Information    Patient Profile Reviewed yes  -AO     Prior Level of Function --  Prior to horse accident (horse trampled pt in 2023) pt was independent w/ all mobility and ADLs w/ no AD, 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, retired teacher, independent all ADLs  -AO     Existing Precautions/Restrictions non-weight bearing  NWB'ing LUE  -AO       Row Name 23 0830          Living Environment    People in Home spouse  -AO       Row Name 23 0830          Home Main Entrance     Number of Stairs, Main Entrance one  -AO       Row Name 09/21/23 0830          Stairs Within Home, Primary    Number of Stairs, Within Home, Primary none  -AO       Row Name 09/21/23 0830          Cognition    Orientation Status (Cognition) oriented x 4  -AO       Row Name 09/21/23 0830          Safety Issues, Functional Mobility    Impairments Affecting Function (Mobility) strength;range of motion (ROM)  -AO               User Key  (r) = Recorded By, (t) = Taken By, (c) = Cosigned By      Initials Name Provider Type    Luz Elena Goncalves, PT Physical Therapist                   Mobility       Row Name 09/21/23 0748          Bed Mobility    Bed Mobility bed mobility (all) activities  -AO     All Activities, [place] (Bed Mobility) modified independence  -AO       Row Name 09/21/23 0748          Bed-Chair Transfer    Bed-Chair [place] (Transfers) independent  -AO       Row Name 09/21/23 0748          Sit-Stand Transfer    Sit-Stand [place] (Transfers) independent  -AO       Row Name 09/21/23 0748          Gait/Stairs (Locomotion)    [place] Level (Gait) independent  -AO     Distance in Feet (Gait) 400 ft  -AO       Row Name 09/21/23 0748          Mobility    Extremity Weight-bearing Status left upper extremity  -AO     Left Upper Extremity (Weight-bearing Status) non weight-bearing (NWB)  -AO               User Key  (r) = Recorded By, (t) = Taken By, (c) = Cosigned By      Initials Name Provider Type    Luz Elena Goncalves, PT Physical Therapist                   Obj/Interventions       Row Name 09/21/23 0748          Range of Motion Comprehensive    General Range of Motion bilateral lower extremity ROM WFL  -AO       Row Name 09/21/23 0748          Strength Comprehensive (MMT)    Comment, General Manual Muscle Testing (MMT) Assessment BLEs: 5/5  -AO       Row Name 09/21/23 0748          Sensory Assessment (Somatosensory)    Sensory Assessment (Somatosensory) sensation intact  -AO                User Key  (r) = Recorded By, (t) = Taken By, (c) = Cosigned By      Initials Name Provider Type    AO Luz Elena Riley PT Physical Therapist                   Goals/Plan       Row Name 09/21/23 0748          Gait Training Goal 1 (PT)    Activity/Assistive Device (Gait Training Goal 1, PT) gait (walking locomotion)  -AO     Distance (Gait Training Goal 1, PT) 400 ft  -AO     Time Frame (Gait Training Goal 1, PT) short term goal (STG);1 day  -AO     Progress/Outcome (Gait Training Goal 1, PT) goal met  -AO       Row Name 09/21/23 0748          Therapy Assessment/Plan (PT)    Planned Therapy Interventions (PT) balance training;bed mobility training;gait training;transfer training  -AO               User Key  (r) = Recorded By, (t) = Taken By, (c) = Cosigned By      Initials Name Provider Type    AO Luz Elena Riley PT Physical Therapist                   Clinical Impression       Row Name 09/21/23 0748          Pain    Pretreatment Pain Rating 5/10  -AO     Posttreatment Pain Rating 5/10  -AO     Pain Location - head  -AO     Pre/Posttreatment Pain Comment Denies shoulder/UE pain  -AO       Row Name 09/21/23 0748          Plan of Care Review    Plan of Care Reviewed With patient  -AO     Progress no change  -AO     Outcome Evaluation Pt is a 67 y/o F POD #1 left reverse TSA following an injury w/ horse accident in February 2023. Pt w/ hx of anxiety/depression, OA, HTN, LBP, L TKA, and UTIs. Prior to horse accident (horse trampled pt in Feb. 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 Pt lives w/  in [facility] w/ 0 SUKHI. During eval, pt demonstrates MOD I with supine to sit transfer, independent w/ bed mobility, and independent w/ gait training 400 ft w/ no AD w/ no LOB/unsteadiness. Anticipate safe return home w/  vs. OP PT vs. HH PT for shoulder rehab but w/ no skilled acute care PT needs at this time. PPE:  gloves  -AO       Row Name 09/21/23 0748          Therapy Assessment/Plan (PT)    Criteria for Skilled Interventions Met (PT) no;no problems identified which require skilled intervention  -AO     Therapy Frequency (PT) evaluation only  -AO       Row Name 09/21/23 0748          Vital Signs    Recovery Time vitals stable and WNL on RA throughout session  -AO       Row Name 09/21/23 0748          Positioning and Restraints    Pre-Treatment Position in bed  -AO     Post Treatment Position chair  -AO     In Chair sitting;call light within reach  -AO               User Key  (r) = Recorded By, (t) = Taken By, (c) = Cosigned By      Initials Name Provider Type    AO Luz Elena Riley R, PT Physical Therapist                   Outcome Measures       Row Name 09/21/23 0819          How much help from another person do you currently need...    Turning from your back to your side while in flat bed without using bedrails? 4  -AH     Moving from lying on back to sitting on the side of a flat bed without bedrails? 4  -AH     Moving to and from a bed to a chair (including a wheelchair)? 4  -AH     Standing up from a chair using your arms (e.g., wheelchair, bedside chair)? 4  -AH     Climbing 3-5 steps with a railing? 4  -AH     To walk in hospital room? 4  -AH     AM-PAC 6 Clicks Score (PT) 24  -AH     Highest level of mobility 8 --> Walked 250 feet or more  -               User Key  (r) = Recorded By, (t) = Taken By, (c) = Cosigned By      Initials Name Provider Type     Damari Serrano LPN Licensed Nurse                                 Physical Therapy Education       Title: PT OT SLP Therapies (In Progress)       Topic: Physical Therapy (Done)       Point: Mobility training (Done)       Learning Progress Summary             Patient Acceptance, E,TB, VU by AO at 9/21/2023 0938                         Point: Precautions (Done)       Learning Progress Summary             Patient Acceptance, E,TB, VU by AO at 9/21/2023 0938                                          User Key       Initials Effective Dates Name Provider Type Discipline    AO 06/16/21 -  Luz Elena Riley, PT Physical Therapist PT                  PT Recommendation and Plan  Planned Therapy Interventions (PT): balance training, bed mobility training, gait training, transfer training  Plan of Care Reviewed With: patient  Progress: no change  Outcome Evaluation: Pt is a 67 y/o F POD #1 left reverse TSA following an injury w/ horse accident in February 2023. Pt w/ hx of anxiety/depression, OA, HTN, LBP, L TKA, and UTIs. Prior to horse accident (horse trampled pt in Feb. 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 Pt lives w/  in [facility] w/ 0 SUKHI. During eval, pt demonstrates MOD I with supine to sit transfer, independent w/ bed mobility, and independent w/ gait training 400 ft w/ no AD w/ no LOB/unsteadiness. Anticipate safe return home w/  vs. OP PT vs. HH PT for shoulder rehab but w/ no skilled acute care PT needs at this time. PPE: gloves     Time Calculation:   PT Evaluation Complexity  History, PT Evaluation Complexity: 1-2 personal factors and/or comorbidities  Examination of Body Systems (PT Eval Complexity): total of 3 or more elements  Clinical Presentation (PT Evaluation Complexity): stable  Clinical Decision Making (PT Evaluation Complexity): low complexity  Overall Complexity (PT Evaluation Complexity): low complexity     PT Charges       Row Name 09/21/23 0937             Time Calculation    Start Time 0748  -AO      Stop Time 0808  -AO      Time Calculation (min) 20 min  -AO      PT Received On 09/21/23  -AO         Time Calculation- PT    Total Timed Code Minutes- PT 0 minute(s)  -AO                User Key  (r) = Recorded By, (t) = Taken By, (c) = Cosigned By      Initials Name Provider Type    AO Luz Elena Riley, PT Physical Therapist                   Therapy Charges for Today       Code Description Service Date Service Provider Modifiers Qty    [id number] HC PT EVAL LOW COMPLEXITY 3 9/21/2023 Luz Elena Riley, PT GP 1            PT G-Codes  AM-PAC 6 Clicks Score (PT): 24  PT Discharge Summary  Anticipated Discharge Disposition (PT): home with home health, home with outpatient therapy services    Luz Elena Riley, PT  9/21/2023

## 2023-09-21 NOTE — THERAPY TREATMENT NOTE
"Subjective: Pt agreeable to therapeutic plan of care.  Cognition: oriented to Person, Place, and Time    Objective:     Bed Mobility: Independent   Functional Transfers: Independent     Balance: supported, dynamic, and standing Independent  Functional Ambulation: Independent    Upper Body Dressing: Supervision  ADL Position: supported sitting and supported standing      Lower Body Dressing: Supervision  ADL Position: supported sitting and supported standing      Therapeutic Exercise - 10 Reps L Upper Extremity AAROM lying supine    Vitals: WNL    Pain: 0 VAS  Location: n/a  Interventions for pain: N/A  Education: Provided education on the importance of mobility in the acute care setting      Assessment: Adela Dowell presents with ADL impairments affecting function including range of motion (ROM) and strength. Demonstrated functioning below baseline abilities indicate the need for continued skilled intervention while inpatient. Tolerating session today without incident. Will continue to follow and progress as tolerated.     Plan/Recommendations:   Low Intensity Therapy recommended post-acute care - This is recommended as therapy feels this patient would require 2-3 visits per week. OP or HH would be the best option depending on patient's home bound status. Consider, if the patient has other  \"skilled\" needs such as wounds, IV antibiotics, etc. Combined with \"low intensity\" could also equate to a SNF. If patient is medically complex, consider LTAC.. Pt requires no DME at discharge.     Pt desires Home with family assist and Outpatient therapy at discharge. Pt cooperative; agreeable to therapeutic recommendations and plan of care.     Modified [place]: 0 = No Symptoms    Post-Tx Position: Up in Chair  PPE: gloves    Subjective: Adela Dowell is s/p TSA, anticipating discharge this date.     Pain level: 0/10 VAS p/o shoulder    Objective: Pt states completing TSA HEP yesterday after therapy services without difficulty. OT " reviewed HEP AAROM of shoulder, AROM of elbow/wrist and hand this date. Pt demos good understanding.     Pt dons/doffs abductor sling with Supervision.  Completes UB dressing with hemiplegic techniques with Supervision, and LB dressing with Supervision.      Pt completes PROM shoulder flexion 0 - 150 this date.    Education: Post-op shoulder precautions, self-care techniques and HEP. Handout provided in total joint handbook.     Assessment: Patient demonstrates good progression toward stated goals. Also with good understanding of HEP and shoulder precautions. Anticipate discharge home with family assistance this date.     Plan: Home Health OT/PT

## 2023-09-21 NOTE — DISCHARGE INSTR - APPOINTMENTS
[facility]  [address] [place] [address]  [phone]    Tuesday September 26 at 8am

## 2023-09-21 NOTE — PLAN OF CARE
Goal Outcome Evaluation:  Plan of Care Reviewed With: patient     Pt. Demonstrates progress w/ AAROM HEP This date 10 reps x 1 set of shoulder flexion approx 150*. PROM completed per protocol ER to neutral and abduction 45*. Pt. Completes all UB and LB dressing w/ setup assist. Pt. Provided w/ handout regarding aforementioned HEP, precautions, and hemidressing technique. Recommend d/c home w/ OP therapy to advance rTSA protocol.

## 2023-09-21 NOTE — PLAN OF CARE
Goal Outcome Evaluation:                      Patient discharging home with spouse and will follow up with dr stauffer on 10/5.

## 2023-09-21 NOTE — DISCHARGE SUMMARY
Date of Discharge:  9/21/2023    Discharge Diagnosis: Osteoarthritis of left glenohumeral joint    Presenting Problem/History of Present Illness  Active Hospital Problems    Diagnosis  POA    **Osteoarthritis of left glenohumeral joint [M19.012]  Unknown    DJD of left shoulder [M19.012]  Yes      Resolved Hospital Problems   No resolved problems to display.        Hospital Course  Patient is a 68 y.o. female presented with left shoulder degenerative joint disease.  They underwent shoulder arthroplasty during admission and postop day 1 were tolerating diet and oral medication and pain was controlled.  Upon entering the room this morning the patient was sitting upright in her bed with her sling in place.  She was alert and oriented with no signs of concern.    Procedures Performed: Left reverse total shoulder      Consults:   Consults       No orders found for last 30 day(s).              Condition on Discharge:  Improved    Vital Signs  Vitals:    09/20/23 1855 09/20/23 1910 09/20/23 1954 09/21/23 0436   BP: 111/66 110/58 113/68 117/62   BP Location:   Right arm Right arm   Patient Position:   Lying Lying   Pulse: 81 85 74 69   Resp:   16 16   Temp:   98.1 °F (36.7 °C) 97.4 °F (36.3 °C)   TempSrc:   Oral Oral   SpO2:   93% 93%   Weight:       Height:           Physical Exam:  Dry dressing, Sensory and motor exam are intact all distributions, albeit decreased sensation to light touch in the thumb secondary to yesterday's limb block. Radial pulse is palpable and capillary refill is less than two seconds to all digits.        Discharge Disposition  Home    Discharge Medications     Discharge Medications        New Medications        Instructions Start Date   oxyCODONE-acetaminophen 5-325 MG per tablet  Commonly known as: Percocet   1 tablet, Oral, Every 6 Hours PRN      promethazine 12.5 MG tablet  Commonly known as: PHENERGAN   12.5 mg, Oral, Every 6 Hours PRN             Continue These Medications         Instructions Start Date   aspirin-acetaminophen-caffeine 250-250-65 MG per tablet  Commonly known as: EXCEDRIN MIGRAINE   2 tablets, Oral, Every 8 Hours PRN      fish oil 1000 MG capsule capsule   1,000 mg, Oral, Daily With Breakfast      FLUoxetine 20 MG capsule  Commonly known as: PROzac   20 mg, Oral, Daily      MAGNESIUM PO   400 mg, Oral, Daily      OSTEO BI-FLEX ADV TRIPLE ST PO   2 capsules, Oral, Daily      vitamin D3 125 MCG (5000 UT) capsule capsule   125 mcg, Oral, Daily             ASK your doctor about these medications        Instructions Start Date   ciprofloxacin 500 MG tablet  Commonly known as: CIPRO   500 mg, Oral, 2 Times Daily      tamsulosin 0.4 MG capsule 24 hr capsule  Commonly known as: FLOMAX   0.4 mg, Oral, Daily             Discharge instructions:  Continue wearing sling.  Keep dressing on.  Okay to shower and perform home exercises.  Continue polar care.  Recommend performing range of motion at the elbow, wrist and digits 3-5 times daily.  Follow-up with Dr. Roberts in 2 weeks.    Follow-up Appointments  Future Appointments   Date Time Provider Department Center   9/21/2023  2:00 PM Aureliano Roberts MD [facility]   4/8/2024 11:15 AM Janet Mendieta DO [facility]        Pernell Patiño PA-C  09/21/23  08:02 EDT      Disclaimer: Part of this note may be an electronic transcription/translation of spoken language to printed text using the Dragon Dictation System

## 2023-09-21 NOTE — CASE MANAGEMENT/SOCIAL WORK
Case Management Discharge Note      Final Note: home         Selected Continued Care - Discharged on 9/21/2023 Admission date: 9/20/2023 - Discharge disposition: Home or Self Care                    Transportation Services  Private: Car    Final Discharge Disposition Code: 01 - home or self-care

## 2023-09-21 NOTE — OUTREACH NOTE
Prep Survey      Flowsheet Row Responses   [facility] discharged [facility]? [facility]   Is LACE score < 7 ? Yes   Eligibility [facility]   Date of Admission 09/20/23   Date of Discharge 09/21/23   Discharge Disposition Home or Self Care   Discharge diagnosis left total shoulder reverse arthroplasty   Does the patient have one of the following disease processes/diagnoses(primary or secondary)? Total Joint Replacement   Does the patient have Home health ordered? No   Is there a DME ordered? No   Comments regarding appointments out pt PT   Prep survey completed? Yes            Manda CRONIN - Registered Nurse

## 2023-09-21 NOTE — PLAN OF CARE
Goal Outcome Evaluation:  Plan of Care Reviewed With: patient        Progress: improving  Outcome Evaluation: Pt ambulated multiple times tonight around the unit. Nerve block still in place. Sling and ice packs to the operative arm. Incentive spirometer at the bedside, education provided on proper use. SCD's on while in bed. VSS and call light within reach. Plan ongoing.

## 2023-09-22 ENCOUNTER — TRANSITIONAL CARE MANAGEMENT TELEPHONE ENCOUNTER (OUTPATIENT)
Dept: CALL CENTER | Facility: HOSPITAL | Age: 68
End: 2023-09-22
Payer: MEDICARE

## 2023-09-22 NOTE — PROGRESS NOTES
Called and spoke to harry. She is set up with physical therapy, she has follow up with ortho surgery in 2 weeks. No need to follow up with pcp at this time unless she has any issues. She will call if she needs anything

## 2023-09-22 NOTE — OUTREACH NOTE
Call Center TCM Note      Flowsheet Row Responses   [facility] patient discharged from? [facility]   Does the patient have one of the following disease processes/diagnoses(primary or secondary)? Total Joint Replacement   Joint surgery performed? Shoulder   TCM attempt successful? Yes   Call start time 1456   Call end time 1459   Discharge diagnosis left total shoulder reverse arthroplasty   Does the patient have all medications related to this admission filled (includes all antibiotics, pain medications, etc.) Yes   Is the patient taking all medications as directed (includes completed medication regime)? Yes   Is the patient able to teach back alternate methods of pain control? Correct alignment, Short, frequent activity, Reposition, Ice, Shoulder-elevate above heart/ keep in sling as advised   Comments Post op FU apt on 10/5/23   Does the patient have an appointment with their PCP within 7-14 days of discharge? No appointments available   Nursing Interventions Routed TCM call to PCP office, PCP office requested to make appointment - message sent   Has home health visited the patient within 72 hours of discharge? N/A   Home health comments [facility] outpt starting 9/26/23   Psychosocial issues? No   When is the first therapy visit scheduled (PO Day) including how many days per week  9/26/23   Has the patient began therapy sessions (either in the home or as an out patient)? No   Has the patient fallen since discharge? No   Did the patient receive a copy of their discharge instructions? Yes   Nursing interventions Reviewed instructions with patient   What is the patient's perception of their functional status since discharge? Improving   Is the patient able to teach back signs and symptoms of infection? Temp >100.4 for 24h or longer, Incisional drainage, Blisters around incision, Increased swelling or redness around incision (not associated with surgical edema), Severe discomfort or pain, Changes in mobility, Shortness  of breath or chest pain   Is the patient able to teach back how to prevent infection? Check incision daily, Keep incision covered if drainage, Shower only as directed by surgeon, No lotion or creams, Wash hands before and after touching incision, Keep incision covered if pets in house   Is the patient able to teach back signs and symptoms of DVT? Redness in calf, Area hot to touch, Shortness of breath or chest pain, Severe pain in calf, Swelling in calf   Is the patient able to teach back home safety measures? Ability to shower, Accessibility to necessary areas in home, Modifications to reach items, Modifications with ADLs such as dressing, cooking, toileting   If the patient is a current smoker, are they able to teach back resources for cessation? Not a smoker   Is the patient/caregiver able to teach back the hierarchy of who to call/visit for symptoms/problems? PCP, Specialist, Home health nurse, Urgent Care, ED, 911 Yes   TCM call completed? Yes   Call end time [id number]            Amaris Demarco RN    9/22/2023, 15:00 EDT

## 2023-09-22 NOTE — OUTREACH NOTE
Call Center TCM Note      Flowsheet Row Responses   [facility] facility patient discharged from? [facility]   Does the patient have one of the following disease processes/diagnoses(primary or secondary)? Total Joint Replacement   TCM attempt successful? No   Unsuccessful attempts Attempt 1  [Amando on verbal release-no answer]            Amaris Demarco RN    9/22/2023, 11:08 EDT

## 2023-10-05 ENCOUNTER — OFFICE VISIT (OUTPATIENT)
Dept: ORTHOPEDIC SURGERY | Facility: CLINIC | Age: 68
End: 2023-10-05
Payer: MEDICARE

## 2023-10-05 VITALS — WEIGHT: 190 LBS | HEART RATE: 74 BPM | HEIGHT: 67 IN | BODY MASS INDEX: 29.82 KG/M2

## 2023-10-05 DIAGNOSIS — Z47.89 ORTHOPEDIC AFTERCARE: Primary | ICD-10-CM

## 2023-10-05 PROCEDURE — 99024 POSTOP FOLLOW-UP VISIT: CPT | Performed by: ORTHOPAEDIC SURGERY

## 2023-10-05 NOTE — PATIENT INSTRUCTIONS
Shoulder Arthroplasty: Post- Operative Visit Objectives    Review the operative findings, procedures and photos.  Make sure medications are effective and not causing problems.  Pain: Oxycodone or hydrocodone is for pain. You may take 1 tablet every 6 hours as necessary.  Some patients don’t require the use of these…in those circumstances just use Tylenol extra-strength 1 or 2 tablets every 4-6 hours.   NSAIDs. For pain and inflammation.  You can take an over the counter anti-inflammatory of choice such as Aleve, Ibuprofen, Motrin or Advil during this time.  If you have had any problems stop taking these medicines and please tell us!  Wound Care:  Today we will remove your dressings.  There may be some residual glue on your incision.  It is now ok to shower without covering it. Please avoid submerging the incision for another two weeks.  Continue ice pack as needed.  Exercises and Physical Therapy   Continue your physical therapy and daily home exercises focusing especially on overhead motion.  Continue the sling and remove for activities such as showering and bringing you hand to your face or hair, no motion behind your back for the next 4 weeks.  Some shoulder replacements with a rotator cuff repair will have more restrictions and we will go over those.   Follow Up appointments Schedule Follow up visits as directed usually in 4 weeks..  Notes  Make sure you have all necessary notes and documentation for school or work.  Issues: Remember our goal is to make this process smooth and easy if there is any thing you need please ask us or call back [phone]

## 2023-10-05 NOTE — PROGRESS NOTES
"     Patient ID: Adela Dowell is a 68 y.o. female.    9/20/23 left reverse total arthroplasty  Pain controlled  Objective:    Pulse 74   Ht 170.2 cm (67\")   Wt 86.2 kg (190 lb)   BMI 29.76 kg/m²     Physical Examination:    Incision healing well no sign of infection  Sensory and motor exam are intact all distributions. Radial pulse is palpable and capillary refill is less than two seconds to all digits.    Imaging:  left Shoulder X-Ray  Indication: Postop total shoulder  AP Y and Lateral views  Findings: Reverse total shoulder in position  no bony lesion  Soft tissues normal  not examined joint spaces  Hardware appropriately positioned yes      yes prior studies available for comparison.    Assessment:  Doing after shoulder arthroplasty    Plan:  Continue sling and therapy see me in a month  "

## 2023-10-23 ENCOUNTER — OFFICE VISIT (OUTPATIENT)
Dept: FAMILY MEDICINE CLINIC | Facility: CLINIC | Age: 68
End: 2023-10-23
Payer: MEDICARE

## 2023-10-23 ENCOUNTER — HOSPITAL ENCOUNTER (OUTPATIENT)
Dept: GENERAL RADIOLOGY | Facility: HOSPITAL | Age: 68
Discharge: HOME | End: 2023-10-23
Admitting: FAMILY MEDICINE
Payer: MEDICARE

## 2023-10-23 VITALS
HEART RATE: 81 BPM | HEIGHT: 67 IN | TEMPERATURE: 97.1 F | BODY MASS INDEX: 30.35 KG/M2 | DIASTOLIC BLOOD PRESSURE: 98 MMHG | OXYGEN SATURATION: 97 % | WEIGHT: 193.4 LBS | RESPIRATION RATE: 18 BRPM | SYSTOLIC BLOOD PRESSURE: 158 MMHG

## 2023-10-23 DIAGNOSIS — R06.02 SHORTNESS OF BREATH: ICD-10-CM

## 2023-10-23 DIAGNOSIS — J45.901 ASTHMATIC BRONCHITIS WITH ACUTE EXACERBATION, UNSPECIFIED ASTHMA SEVERITY, UNSPECIFIED WHETHER PERSISTENT: Primary | ICD-10-CM

## 2023-10-23 PROBLEM — F41.9 ANXIETY: Status: ACTIVE | Noted: 2023-10-23

## 2023-10-23 PROCEDURE — 1159F MED LIST DOCD IN RCRD: CPT | Performed by: FAMILY MEDICINE

## 2023-10-23 PROCEDURE — 71046 X-RAY EXAM CHEST 2 VIEWS: CPT

## 2023-10-23 PROCEDURE — 99214 OFFICE O/P EST MOD 30 MIN: CPT | Performed by: FAMILY MEDICINE

## 2023-10-23 PROCEDURE — 3080F DIAST BP >= 90 MM HG: CPT | Performed by: FAMILY MEDICINE

## 2023-10-23 PROCEDURE — 1160F RVW MEDS BY RX/DR IN RCRD: CPT | Performed by: FAMILY MEDICINE

## 2023-10-23 PROCEDURE — 3077F SYST BP >= 140 MM HG: CPT | Performed by: FAMILY MEDICINE

## 2023-10-23 RX ORDER — ALBUTEROL SULFATE 2.5 MG/3ML
2.5 SOLUTION RESPIRATORY (INHALATION) EVERY 4 HOURS PRN
Qty: 75 ML | Refills: 0 | Status: SHIPPED | OUTPATIENT
Start: 2023-10-23

## 2023-10-23 RX ORDER — AZITHROMYCIN 250 MG/1
TABLET, FILM COATED ORAL
Qty: 6 TABLET | Refills: 0 | Status: SHIPPED | OUTPATIENT
Start: 2023-10-23 | End: 2023-10-30

## 2023-10-23 RX ORDER — FLUTICASONE FUROATE AND VILANTEROL 100; 25 UG/1; UG/1
1 POWDER RESPIRATORY (INHALATION)
Qty: 1 EACH | Refills: 0 | Status: SHIPPED | OUTPATIENT
Start: 2023-10-23

## 2023-10-23 NOTE — PROGRESS NOTES
Chief Complaint   Patient presents with    Cough       Subjective   Adela Dowell is a 68 y.o. female.     Cough  This is a new problem. The current episode started 1 to 4 weeks ago (5 weeks). The problem has been gradually worsening. The problem occurs every few minutes. Associated symptoms include a fever, nasal congestion, shortness of breath, sweats and wheezing. Pertinent negatives include no chest pain, chills, ear congestion, ear pain, headaches, heartburn or sore throat. The symptoms are aggravated by exercise. Treatments tried: albuterol inhlaer, steriods, antibiotics.     Patient feels that she may have had something starting just prior to her surgery.    Patient went to after hours twice for this complaint.  The first visit was 10/4/23.  Given post-op status, she was then directed onto the [facility] ER for evaluation.  In the ER, influenza, RSV and COVID testing were negative.  Initial CXR negative.  D-dimer was elevated and CTA was performed.  The CTA was negative for PE.  She was noted to have wheezing on exam that improved with administration of a Duoneb.      On 10/13/23, patient presented back to the [facility] UC for the same symptoms.  She was given steroids (prednisone) and antibiotics (doxycyline) and told to take allegra everyday as well.  Patient reports that she finished the antibiotics and steroids this morning.     Her cough is improving but she is still very short of breath.  She denies similar symptoms in the past.      Had shoulder surgery on 09/202/2023 was given an albuterol inhaler while in recovery.       Past Medical History :  Active Ambulatory Problems     Diagnosis Date Noted    Hypertension 02/14/2019    Disorder of bursae and tendons in shoulder region 09/23/2019    Chronic headaches 02/14/2019    Arthritis 02/14/2019    Anemia 02/14/2019    H/O splenectomy 09/23/2019    Osteopenia of lumbar spine 06/05/2021    Depressive disorder 12/06/2021    Class 1 obesity due to excess calories with body  mass index (BMI) of 32.0 to 32.9 in adult 09/15/2022    History of kidney stones 03/07/2023    Osteoarthritis of left glenohumeral joint 08/08/2023    DJD of left shoulder 09/20/2023    Anxiety 10/23/2023     Resolved Ambulatory Problems     Diagnosis Date Noted    Moderate episode of recurrent major depressive disorder 02/14/2019    Depression 09/23/2019    Overweight 09/23/2019    Obesity 09/23/2019    Viral upper respiratory tract infection 01/28/2020    Acute bacterial sinusitis 01/28/2020     Past Medical History:   Diagnosis Date    Headache     Kidney stone     Low back pain     Presence of left artificial knee joint     Slow to wake up after anesthesia     Upper respiratory infection     Urinary tract infection        Medication List:    Current Outpatient Medications:     aspirin-acetaminophen-caffeine (EXCEDRIN MIGRAINE) 250-250-65 MG per tablet, Take 2 tablets by mouth Every 8 (Eight) Hours As Needed for Headache., Disp: , Rfl:     FLUoxetine (PROzac) 20 MG capsule, Take 1 capsule by mouth Daily., Disp: , Rfl:     MAGNESIUM PO, Take 400 mg by mouth Daily., Disp: , Rfl:     Melatonin 1 MG capsule, , Disp: , Rfl:     Misc Natural Products (OSTEO BI-FLEX ADV TRIPLE ST PO), Take 2 capsules by mouth Daily., Disp: , Rfl:     Omega-3 Fatty Acids (fish oil) 1000 MG capsule capsule, Take 1 capsule by mouth Daily With Breakfast., Disp: , Rfl:     vitamin D3 125 MCG (5000 UT) capsule capsule, Take 125 mcg by mouth Daily., Disp: , Rfl:     Allergies   Allergen Reactions    Levofloxacin Swelling and Myalgia     Joint swelling       Social History     Tobacco Use    Smoking status: Never     Passive exposure: Past    Smokeless tobacco: Never   Substance Use Topics    Alcohol use: Never     Review of Systems   Constitutional:  Positive for fever. Negative for chills.   HENT:  Positive for voice change. Negative for ear pain, facial swelling and sore throat.    Respiratory:  Positive for cough, chest tightness, shortness  "of breath and wheezing. Negative for stridor.    Cardiovascular:  Negative for chest pain, palpitations and leg swelling.   Gastrointestinal:  Negative for vomiting.   Musculoskeletal:         She reports she is recovering well from her shoulder surgery.   Neurological:  Negative for syncope.       Objective   Vitals:    10/23/23 1259   BP: 158/98   Pulse: 81   Resp: 18   Temp: 97.1 °F (36.2 °C)   TempSrc: Temporal   SpO2: 97%   Weight: 87.7 kg (193 lb 6.4 oz)   Height: 170.2 cm (67\")     Body mass index is 30.29 kg/m².    Physical Exam  Constitutional:       General: She is not in acute distress.     Appearance: Normal appearance. She is well-developed.   HENT:      Head: Normocephalic and atraumatic.   Eyes:      General: No scleral icterus.        Right eye: No discharge.         Left eye: No discharge.      Extraocular Movements: Extraocular movements intact.      Conjunctiva/sclera: Conjunctivae normal.   Cardiovascular:      Rate and Rhythm: Normal rate and regular rhythm.      Heart sounds: Normal heart sounds. No murmur heard.  Pulmonary:      Effort: Pulmonary effort is normal.      Breath sounds: Normal breath sounds.   Musculoskeletal:         General: No swelling.      Cervical back: Normal range of motion and neck supple.      Right lower leg: No edema.      Left lower leg: No edema.   Skin:     General: Skin is warm.      Capillary Refill: Capillary refill takes less than 2 seconds.      Findings: No rash.   Neurological:      General: No focal deficit present.      Mental Status: She is alert. Mental status is at baseline.         The following data was reviewed by: Arabella Torres MD on 10/23/2023:  Data reviewed : Radiologic studies CXR and CT Chest PE protocol and Consultant notes ER records [facility], UC records [facility], orthopedic records.      XR Chest 2 View    Result Date: 10/23/2023  Impression: 1. Hyperexpansion of the lungs without focal consolidation 2. Minimal blunting of the " costophrenic angles favored to represent hyperexpansion. Trace amounts of pleural fluid considered less likely Electronically Signed: Darrel Vigil MD  10/23/2023 2:46 PM EDT  Workstation ID: [id number]        Assessment & Plan     Diagnoses and all orders for this visit:    1. Asthmatic bronchitis with acute exacerbation, unspecified asthma severity, unspecified whether persistent (Primary)  -     albuterol (PROVENTIL) (2.5 MG/3ML) 0.083% nebulizer solution; Take 2.5 mg by nebulization Every 4 (Four) Hours As Needed for Wheezing.  Dispense: 75 mL; Refill: 0  -     azithromycin (ZITHROMAX) 250 MG tablet; Take 2 tablets the first day, then 1 tablet daily for 4 days.  Dispense: 6 tablet; Refill: 0  -     Fluticasone Furoate-Vilanterol 100-25 MCG/ACT aerosol powder ; Inhale 1 puff Daily.  Dispense: 1 each; Refill: 0    2. Shortness of breath  -     XR Chest 2 View    Acute asthmatic bronchitis vs viral bronchitis with reactive airway component.    She has been evaluated by the [facility] ER for PE (CT negative).    Adela was given an albuterol nebulizer treatment in the office with improvement in her symptoms (difficulty breathing).  After administration, she did have improved airflow and faint crackles noted.  CXR was obtained prior to patient leaving the office, showing the results as above.    Adela was sent home with a nebulizer machine.  Start Breo and albuterol nebs q4-6 hrs PRN.  Azithromycin for atypical coverage.  RSV, COVID and Flu testing was completed by [facility] ER at initial presentation.      Adela was scheduled a follow-up appointment with her PCP next week but can return to see me this week if needed.  However, if symptoms worsen, I have recommended she present to the [facility] ER for evaluation.      Return in about 10 days (around 11/2/2023) for Recheck.       Patient was given instructions and counseling regarding his/her condition or for health maintenance advice. Please see specific information pulled into the AVS if  appropriate.     I wore protective equipment throughout this patient encounter to include mask.

## 2023-11-02 ENCOUNTER — OFFICE VISIT (OUTPATIENT)
Dept: ORTHOPEDIC SURGERY | Facility: CLINIC | Age: 68
End: 2023-11-02
Payer: MEDICARE

## 2023-11-02 VITALS — HEART RATE: 78 BPM | BODY MASS INDEX: 30.29 KG/M2 | HEIGHT: 67 IN | WEIGHT: 193 LBS

## 2023-11-02 DIAGNOSIS — Z47.89 ORTHOPEDIC AFTERCARE: Primary | ICD-10-CM

## 2023-11-02 PROCEDURE — 99024 POSTOP FOLLOW-UP VISIT: CPT | Performed by: ORTHOPAEDIC SURGERY

## 2023-11-02 NOTE — PROGRESS NOTES
Patient ID: Adela Dowell is a 68 y.o. female.    9/20/23 left reverse total arthroplasty   Minimal shoulder pain    Objective:    There were no vitals taken for this visit.    Physical Examination:    Left shoulder healed incision active elevation 160 abduction 120 external rotation 30    Imaging:      Assessment:  Doing well after shoulder arthroplasty    Plan:  Finish out formal therapy gradual activity as tolerated x-ray in 4 months

## 2023-11-07 ENCOUNTER — TELEPHONE (OUTPATIENT)
Dept: FAMILY MEDICINE CLINIC | Facility: CLINIC | Age: 68
End: 2023-11-07
Payer: MEDICARE

## 2023-11-07 DIAGNOSIS — J45.901 ASTHMATIC BRONCHITIS WITH ACUTE EXACERBATION, UNSPECIFIED ASTHMA SEVERITY, UNSPECIFIED WHETHER PERSISTENT: Primary | ICD-10-CM

## 2023-11-07 NOTE — TELEPHONE ENCOUNTER
----- Message from Adela Dowell sent at 11/7/2023  9:08 AM EST -----  Regarding: Breathing   Contact: [phone]  Hi Dr. Mendieta!  I came down with this virus over six weeks ago after I had surgery.  Two visits to after hours, the ER,and Dr. Torres and I still can't breathe. I've had two rounds of antibiotics,  a steroid nebulizer, and two inhalers. I have an appointment with you at the end of the month but is there anything I can do before then?  I can't even carry on a conversation.  Thanks!

## 2023-11-08 RX ORDER — METHYLPREDNISOLONE 4 MG/1
TABLET ORAL
Qty: 21 TABLET | Refills: 0 | Status: SHIPPED | OUTPATIENT
Start: 2023-11-08

## 2023-11-08 NOTE — TELEPHONE ENCOUNTER
Sending in Medrol Dosepak to see if that can help out her symptoms.  Patient must follow-up on her scheduled follow-up date (11/27/2023)

## 2023-11-27 ENCOUNTER — OFFICE VISIT (OUTPATIENT)
Dept: FAMILY MEDICINE CLINIC | Facility: CLINIC | Age: 68
End: 2023-11-27
Payer: MEDICARE

## 2023-11-27 VITALS
HEIGHT: 67 IN | RESPIRATION RATE: 18 BRPM | SYSTOLIC BLOOD PRESSURE: 130 MMHG | BODY MASS INDEX: 31.99 KG/M2 | OXYGEN SATURATION: 97 % | HEART RATE: 80 BPM | DIASTOLIC BLOOD PRESSURE: 74 MMHG | TEMPERATURE: 97.8 F | WEIGHT: 203.8 LBS

## 2023-11-27 DIAGNOSIS — M85.88 OSTEOPENIA OF LUMBAR SPINE: ICD-10-CM

## 2023-11-27 DIAGNOSIS — R06.02 SOB (SHORTNESS OF BREATH): Primary | ICD-10-CM

## 2023-11-27 NOTE — PATIENT INSTRUCTIONS
- doing PFT (breathing test)  - placing referral to pulmonology ([place]) and allergist ([place])

## 2023-11-27 NOTE — PROGRESS NOTES
Subjective   Adela Dowell is a 68 y.o. female.   Chief Complaint   Patient presents with    Shortness of Breath    Cough       History of Present Illness     Shortness of breath:  -Follow up from:  10/23/2023 (evaluated by Dr. Arabella Torres)  -Pt was prescribed Albuterol nebulizer treatments, Fluticasone Furoate Vilanterol, Medrol pack  -Chest x-ray (10/23/2023): Hyperexpansion noted in the lungs  -Patient states she has had this issue for about 9 weeks in total  -Pt states she has noted no improvement with the daily Breo inhaler, albuterol, nebulizers, very minimal improvement with Medrol Dosepak  - slight improvement (just started feeling better in the last few days) but stamina is still very poor  -Patient has been using a nasal spray and allergy pill at night to help her breathe through the night and sleep.  She states this is somewhat helpful  -Has not seen a pulmonologist in the past  -Has no personal history of smoking but her father was a heavy smoker and smoked in the house, in the car (patient lived with her father to about 22 years of age).  In her early 20s, they were a few women who smoked in the office      Depression  -Patient states her depression has lapsed. Doing TMS again with pyschiatrist in [place]         Preventative  - Offered DEXA Scan, pt agrees, order placed    -Patient has had her shoulder surgery done.  She states she is doing well with that and is able to do the physical therapy exercises    The following portions of the patient's history were reviewed and updated as appropriate: allergies, current medications, past family history, past medical history, past social history, past surgical history, and problem list.    Patient Active Problem List   Diagnosis    Hypertension    Disorder of bursae and tendons in shoulder region    Chronic headaches    Arthritis    Anemia    H/O splenectomy    Osteopenia of lumbar spine    Depressive disorder    Class 1 obesity due to excess  "calories with body mass index (BMI) of 32.0 to 32.9 in adult    History of kidney stones    Osteoarthritis of left glenohumeral joint    DJD of left shoulder    Anxiety       Current Outpatient Medications on File Prior to Visit   Medication Sig Dispense Refill    aspirin-acetaminophen-caffeine (EXCEDRIN MIGRAINE) 250-250-65 MG per tablet Take 2 tablets by mouth Every 8 (Eight) Hours As Needed for Headache.      FLUoxetine (PROzac) 20 MG capsule Take 1 capsule by mouth Daily.      MAGNESIUM PO Take 400 mg by mouth Daily.      Melatonin 1 MG capsule       Misc Natural Products (OSTEO BI-FLEX ADV TRIPLE ST PO) Take 2 capsules by mouth Daily.      Omega-3 Fatty Acids (fish oil) 1000 MG capsule capsule Take 1 capsule by mouth Daily With Breakfast.      vitamin D3 125 MCG (5000 UT) capsule capsule Take 125 mcg by mouth Daily.      [DISCONTINUED] albuterol (PROVENTIL) (2.5 MG/3ML) 0.083% nebulizer solution Take 2.5 mg by nebulization Every 4 (Four) Hours As Needed for Wheezing. (Patient not taking: Reported on 11/2/2023) 75 mL 0    [DISCONTINUED] Fluticasone Furoate-Vilanterol 100-25 MCG/ACT aerosol powder  Inhale 1 puff Daily. (Patient not taking: Reported on 11/2/2023) 1 each 0    [DISCONTINUED] methylPREDNISolone (MEDROL) 4 MG dose pack Take as directed on package instructions. 21 tablet 0     No current facility-administered medications on file prior to visit.     Current outpatient and discharge medications have been reconciled for the patient.  Reviewed by: Janet Mendieta DO      Allergies   Allergen Reactions    Levofloxacin Swelling and Myalgia     Joint swelling         Objective   Visit Vitals  /74 (BP Location: Right arm, Patient Position: Sitting, Cuff Size: Adult)   Pulse 80   Temp 97.8 °F (36.6 °C) (Skin)   Resp 18   Ht 170.2 cm (67\")   Wt 92.4 kg (203 lb 12.8 oz)   SpO2 97%   BMI 31.92 kg/m²       Physical Exam  Constitutional:       Comments: - Patient did cough multiple times during the " appointment.  Did a few labored breaths   HENT:      Head: Normocephalic and atraumatic.   Eyes:      Conjunctiva/sclera: Conjunctivae normal.   Cardiovascular:      Rate and Rhythm: Normal rate and regular rhythm.      Heart sounds: Normal heart sounds.   Pulmonary:      Effort: Pulmonary effort is normal. No respiratory distress.      Breath sounds: Normal breath sounds. No wheezing, rhonchi or rales.   Neurological:      General: No focal deficit present.      Mental Status: She is alert and oriented to person, place, and time.   Psychiatric:         Mood and Affect: Mood normal.         Behavior: Behavior normal.           Diagnoses and all orders for this visit:    1. SOB (shortness of breath) (Primary)  -Patient stated she never had a history of asthma or COPD.  Considering that her lungs looked a little hyper inflated on the chest x-ray, patient has been having issues with shortness of breath for 9 weeks, no reported improvement on inhalers that should be applicable to asthma/COPD, discussed with patient that I think seeing pulmonology may be the best next step.  Will go ahead and order PFT while we are waiting so that pulmonology has something to help assess her with  -   Ambulatory Referral to Pulmonology: Dr. Pena in [place]  -     Ambulatory Referral to Allergy: Dr davison in [place].  Made as patient noticed improvement with over-the-counter allergy medications.  Offered Singulair but Singulair does have the potential to increase anxiety depression in patients and patient is having a lapse in depression right now.  Patient would like to wait until after she is evaluated by allergist  -     [facility] performed    2. Osteopenia of lumbar spine  -     DEXA Bone Density Axial             Follow Up  -6 weeks to check in on SOB    Expected course, medications, and adverse effects discussed as appropriate.  Call or return if worsening or persistent symptoms.  I wore protective equipment  throughout this patient encounter to include mask and eye protection. Hand hygiene was performed before donning protective equipment and after removal when leaving the room.    This document is intended for medical expert use only. Reading of this document by patients and/or patient's family without participating medical staff guidance may result in misinterpretation and unintended morbidity. Any interpretation of such data is the responsibility of the patient and/or family member responsible for the patient in concert with their primary or specialist providers, not to be left for sources of online searches such as [employer], [employer] or similar queries. Relying on these approaches to knowledge may result in misinterpretation, misguided goals of care and even death should patients or family members try recommendations outside of the realm of professional medical care.

## 2023-11-29 DIAGNOSIS — J45.901 ASTHMATIC BRONCHITIS WITH ACUTE EXACERBATION, UNSPECIFIED ASTHMA SEVERITY, UNSPECIFIED WHETHER PERSISTENT: ICD-10-CM

## 2023-11-30 RX ORDER — FLUTICASONE FUROATE AND VILANTEROL 100; 25 UG/1; UG/1
1 POWDER RESPIRATORY (INHALATION)
Qty: 60 EACH | Refills: 3 | Status: SHIPPED | OUTPATIENT
Start: 2023-11-30

## 2023-12-07 ENCOUNTER — HOSPITAL ENCOUNTER (OUTPATIENT)
Dept: BONE DENSITY | Facility: HOSPITAL | Age: 68
Discharge: HOME | End: 2023-12-07
Admitting: STUDENT IN AN ORGANIZED HEALTH CARE EDUCATION/TRAINING PROGRAM
Payer: MEDICARE

## 2023-12-07 DIAGNOSIS — M85.88 OSTEOPENIA OF LUMBAR SPINE: ICD-10-CM

## 2023-12-07 PROCEDURE — 77080 DXA BONE DENSITY AXIAL: CPT

## 2023-12-15 ENCOUNTER — HOSPITAL ENCOUNTER (OUTPATIENT)
Dept: RESPIRATORY THERAPY | Facility: HOSPITAL | Age: 68
Discharge: HOME | End: 2023-12-15
Payer: MEDICARE

## 2023-12-15 VITALS — RESPIRATION RATE: 22 BRPM | OXYGEN SATURATION: 98 % | HEART RATE: 77 BPM

## 2023-12-15 PROCEDURE — 94664 DEMO&/EVAL PT USE INHALER: CPT

## 2023-12-15 PROCEDURE — 94060 EVALUATION OF WHEEZING: CPT

## 2023-12-15 PROCEDURE — 94799 UNLISTED PULMONARY SVC/PX: CPT

## 2023-12-15 RX ORDER — ALBUTEROL SULFATE 90 UG/1
2 AEROSOL, METERED RESPIRATORY (INHALATION) ONCE
Status: COMPLETED | OUTPATIENT
Start: 2023-12-15 | End: 2023-12-15

## 2023-12-15 RX ADMIN — ALBUTEROL SULFATE 2 PUFF: 108 AEROSOL, METERED RESPIRATORY (INHALATION) at 13:11

## 2023-12-28 ENCOUNTER — TELEPHONE (OUTPATIENT)
Dept: FAMILY MEDICINE CLINIC | Facility: CLINIC | Age: 68
End: 2023-12-28
Payer: MEDICARE

## 2023-12-28 NOTE — TELEPHONE ENCOUNTER
Spoke to pt 12/28/2023, 4:04 pm advised pt of PFT resu;ts per Dr. Mendieta.    Pt states she has appts with pulmonology and allergy

## 2023-12-28 NOTE — TELEPHONE ENCOUNTER
----- Message from Janet Mendieta DO sent at 12/28/2023  1:14 PM EST -----  Please let patient know that her PFT showed that she has some findings suggestive of a mild obstructive defect (possibly asthma or COPD but it is not a clear diagnosis).  Would recommend she follow-up with pulmonology (referral was placed on 11/27/2023) and allergy to see if they can help with her shortness of breath if she is not improving

## 2024-01-02 ENCOUNTER — TELEPHONE (OUTPATIENT)
Dept: FAMILY MEDICINE CLINIC | Facility: CLINIC | Age: 69
End: 2024-01-02
Payer: MEDICARE

## 2024-01-02 DIAGNOSIS — Z77.22 HISTORY OF SECOND HAND SMOKE EXPOSURE: ICD-10-CM

## 2024-01-02 DIAGNOSIS — R06.02 SOB (SHORTNESS OF BREATH): Primary | ICD-10-CM

## 2024-01-02 NOTE — TELEPHONE ENCOUNTER
"Last saw patient on 11/27/2023.  She had been prescribed albuterol nebulizers, fluticasone furoate vilaterol and Medrol Dosepak without any help for her shortness of breath.  Patient's chest x-ray on 10/23/2023 showed hyperexpansion of the lungs.  She has no personal history of smoking but her father was a heavy smoker and he smoked in the house (lived with him for about 20 years)  Had ordered PFT, pulmonology referral and placed referral for allergy.      Patient received call to get her scheduled with the pulmonologist but they could not see her until March.  Pulmonologist office did tell patient that Dr. Breanne Castillo had openings in the near future but they needed a referral from PCP to this provider and need to be listed as \"urgent\".  Placing referral per patient request    "

## 2024-01-02 NOTE — TELEPHONE ENCOUNTER
Caller: Adela Dowell    Relationship: Self    Best call back number: [phone]    What is the medical concern/diagnosis: DIFFICULTY BREATHING    What specialty or service is being requested: PULMONOLOGIST    What is the provider, practice or medical service name: [facility]    What is the office location: [address] [place] [address]    What is the office phone number: [phone]    Any additional details: PATIENT STATED THAT DR MCCONNELL HAD REFERRED HER SOMEWHERE ELSE BUT THEY COULD NOT GET HER IN UNTIL MARCH AND SOMEONE RECOMMEND THE ABOVE PROVIDERS OS SHE CALLED THEM AND THEY SAID THEY COULD GET HER RIGHT IN DR MCCONNELL JUST NEEDED TO SEND THE REFERRAL WITH ASAP ON IT.

## 2024-01-04 ENCOUNTER — TELEPHONE (OUTPATIENT)
Dept: FAMILY MEDICINE CLINIC | Facility: CLINIC | Age: 69
End: 2024-01-04
Payer: MEDICARE

## 2024-01-04 NOTE — TELEPHONE ENCOUNTER
For the Pulmonology referral to Dr Castillo's office. I got this message:    Shahab Gonzales, this is Puja from Dr. Castillo's private office on [address] I am pulling off the referral for this patient, however our office is not linked to [facility]. If you want your patient's to be seen by our team sooner rather than later, please directly fax your referral to us at [phone] and we would be happy to get them in the private office ASAP!     Could we fax her referral please?

## 2024-01-09 ENCOUNTER — OFFICE VISIT (OUTPATIENT)
Dept: FAMILY MEDICINE CLINIC | Facility: CLINIC | Age: 69
End: 2024-01-09
Payer: MEDICARE

## 2024-01-09 VITALS
BODY MASS INDEX: 32.11 KG/M2 | WEIGHT: 204.6 LBS | OXYGEN SATURATION: 96 % | SYSTOLIC BLOOD PRESSURE: 140 MMHG | HEIGHT: 67 IN | HEART RATE: 71 BPM | DIASTOLIC BLOOD PRESSURE: 82 MMHG | RESPIRATION RATE: 16 BRPM | TEMPERATURE: 98.1 F

## 2024-01-09 DIAGNOSIS — Z12.31 ENCOUNTER FOR SCREENING MAMMOGRAM FOR MALIGNANT NEOPLASM OF BREAST: ICD-10-CM

## 2024-01-09 DIAGNOSIS — R06.02 SHORTNESS OF BREATH: Primary | ICD-10-CM

## 2024-01-09 PROCEDURE — 3077F SYST BP >= 140 MM HG: CPT | Performed by: STUDENT IN AN ORGANIZED HEALTH CARE EDUCATION/TRAINING PROGRAM

## 2024-01-09 PROCEDURE — 3079F DIAST BP 80-89 MM HG: CPT | Performed by: STUDENT IN AN ORGANIZED HEALTH CARE EDUCATION/TRAINING PROGRAM

## 2024-01-09 PROCEDURE — 99213 OFFICE O/P EST LOW 20 MIN: CPT | Performed by: STUDENT IN AN ORGANIZED HEALTH CARE EDUCATION/TRAINING PROGRAM

## 2024-01-09 RX ORDER — BUDESONIDE, GLYCOPYRROLATE, AND FORMOTEROL FUMARATE 160; 9; 4.8 UG/1; UG/1; UG/1
AEROSOL, METERED RESPIRATORY (INHALATION)
COMMUNITY
Start: 2023-12-20

## 2024-01-09 RX ORDER — NITROFURANTOIN 25; 75 MG/1; MG/1
1 CAPSULE ORAL EVERY 12 HOURS SCHEDULED
COMMUNITY
Start: 2023-12-26 | End: 2024-01-09

## 2024-01-09 NOTE — PROGRESS NOTES
Subjective   Adela Dowell is a 68 y.o. female.   Chief Complaint   Patient presents with    Shortness of Breath     Follow up       History of Present Illness     Shortness of breath:  -Follow up from 11/27/2023: Patient has been having shortness of breath since 10/20/2023.  She has been given antibiotics, fluticasone, steroids, albuterol inhalers without any improvement.  Patient was referred to pulmonology and allergy.  PFT was also ordered  - PFT showed some findings suggestive of a mild obstructive defect, asthma or COPD but no clear diagnosis.  -Pt states no improvement today  -Pt has an appointment Dr. 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    Patient Active Problem List   Diagnosis    Hypertension    Disorder of bursae and tendons in shoulder region    Chronic headaches    Arthritis    Anemia    H/O splenectomy    Osteopenia of lumbar spine    Depressive disorder    Class 1 obesity due to excess calories with body mass index (BMI) of 32.0 to 32.9 in adult    History of kidney stones    Osteoarthritis of left glenohumeral joint    DJD of left shoulder    Anxiety       Current Outpatient Medications on File Prior to Visit   Medication Sig Dispense Refill    aspirin-acetaminophen-caffeine (EXCEDRIN MIGRAINE) 250-250-65 MG per tablet Take 2 tablets by mouth Every 8 (Eight) Hours As Needed for Headache.      Calcium-Magnesium-Vitamin D (CALCIUM 1200+D3 PO)       FLUoxetine (PROzac) 20 MG capsule Take 1 capsule by mouth Daily.      MAGNESIUM PO Take 400 mg  "by mouth Daily.      Melatonin 1 MG capsule       Mis Natural Products (OSTEO BI-FLEX ADV TRIPLE ST PO) Take 2 capsules by mouth Daily.      Omega-3 Fatty Acids (fish oil) 1000 MG capsule capsule Take 1 capsule by mouth Daily With Breakfast.      vitamin D3 125 MCG (5000 UT) capsule capsule Take 125 mcg by mouth Daily.      [DISCONTINUED] nitrofurantoin, macrocrystal-monohydrate, (MACROBID) 100 MG capsule Take 1 capsule by mouth Every 12 (Twelve) Hours.      Breztri Aerosphere 160-9-4.8 MCG/ACT aerosol inhaler  (Patient not taking: Reported on 1/9/2024)      [DISCONTINUED] Fluticasone Furoate-Vilanterol 100-25 MCG/ACT aerosol powder  Inhale 1 puff Daily. 60 each 3     No current facility-administered medications on file prior to visit.     Current outpatient and discharge medications have been reconciled for the patient.  Reviewed by: Janet Mendieta DO      Allergies   Allergen Reactions    Levofloxacin Swelling and Myalgia     Joint swelling         Objective   Visit Vitals  /82 (BP Location: Right arm, Patient Position: Sitting, Cuff Size: Adult)   Pulse 71   Temp 98.1 °F (36.7 °C) (Skin)   Resp 16   Ht 170.2 cm (67\")   Wt 92.8 kg (204 lb 9.6 oz)   SpO2 96%   BMI 32.04 kg/m²       Physical Exam  HENT:      Head: Normocephalic and atraumatic.   Eyes:      Conjunctiva/sclera: Conjunctivae normal.   Pulmonary:      Comments: - Patient having some shortness of breath getting up from the chair and walking out of the office  Neurological:      General: No focal deficit present.      Mental Status: She is alert and oriented to person, place, and time.   Psychiatric:         Mood and Affect: Mood normal.         Behavior: Behavior normal.           Diagnoses and all orders for this visit:    1. Shortness of breath (Primary)  -     CT Chest Without Contrast; Future to get a clear evaluation of patient's lungs  -Patient will be seeing pulmonology in about 10 more days.  They requested imaging on a disc for her.  " Told her to asked the imaging centers for a copy on a disc to show pulmonology    2. Encounter for screening mammogram for malignant neoplasm of breast  -     Mammo Screening Digital Tomosynthesis Bilateral With CAD; Future             Follow Up  -4/8/2024 for annual wellness exam    Expected course, medications, and adverse effects discussed as appropriate.  Call or return if worsening or persistent symptoms.  I wore protective equipment throughout this patient encounter to include mask and eye protection. Hand hygiene was performed before donning protective equipment and after removal when leaving the room.    This document is intended for medical expert use only. Reading of this document by patients and/or patient's family without participating medical staff guidance may result in misinterpretation and unintended morbidity. Any interpretation of such data is the responsibility of the patient and/or family member responsible for the patient in concert with their primary or specialist providers, not to be left for sources of online searches such as [employer], ChoiceMap or similar queries. Relying on these approaches to knowledge may result in misinterpretation, misguided goals of care and even death should patients or family members try recommendations outside of the realm of professional medical care.

## 2024-01-15 ENCOUNTER — HOSPITAL ENCOUNTER (OUTPATIENT)
Dept: CT IMAGING | Facility: HOSPITAL | Age: 69
Discharge: HOME | End: 2024-01-15
Admitting: STUDENT IN AN ORGANIZED HEALTH CARE EDUCATION/TRAINING PROGRAM
Payer: MEDICARE

## 2024-01-15 DIAGNOSIS — R06.02 SHORTNESS OF BREATH: ICD-10-CM

## 2024-01-15 PROCEDURE — 71250 CT THORAX DX C-: CPT

## 2024-01-17 DIAGNOSIS — J45.40 MODERATE PERSISTENT ASTHMA WITHOUT COMPLICATION: ICD-10-CM

## 2024-01-17 DIAGNOSIS — J84.9 ILD (INTERSTITIAL LUNG DISEASE): Primary | ICD-10-CM

## 2024-01-18 ENCOUNTER — LAB (OUTPATIENT)
Dept: LAB | Facility: HOSPITAL | Age: 69
End: 2024-01-18
Payer: MEDICARE

## 2024-01-18 DIAGNOSIS — J84.9 ILD (INTERSTITIAL LUNG DISEASE): ICD-10-CM

## 2024-01-18 DIAGNOSIS — J45.40 MODERATE PERSISTENT ASTHMA WITHOUT COMPLICATION: ICD-10-CM

## 2024-01-18 LAB
APTT PPP: 28.7 SECONDS (ref 24–31)
BASOPHILS # BLD AUTO: 0.08 10*3/MM3 (ref 0–0.2)
BASOPHILS NFR BLD AUTO: 1.1 % (ref 0–1.5)
DEPRECATED RDW RBC AUTO: 41.4 FL (ref 37–54)
EOSINOPHIL # BLD AUTO: 0.37 10*3/MM3 (ref 0–0.4)
EOSINOPHIL NFR BLD AUTO: 5.2 % (ref 0.3–6.2)
ERYTHROCYTE [DISTWIDTH] IN BLOOD BY AUTOMATED COUNT: 13.4 % (ref 12.3–15.4)
HCT VFR BLD AUTO: 42.9 % (ref 34–46.6)
HGB BLD-MCNC: 14.7 G/DL (ref 12–15.9)
IMM GRANULOCYTES # BLD AUTO: 0.02 10*3/MM3 (ref 0–0.05)
IMM GRANULOCYTES NFR BLD AUTO: 0.3 % (ref 0–0.5)
INR PPP: 0.99 (ref 0.93–1.1)
LYMPHOCYTES # BLD AUTO: 2.34 10*3/MM3 (ref 0.7–3.1)
LYMPHOCYTES NFR BLD AUTO: 32.8 % (ref 19.6–45.3)
MCH RBC QN AUTO: 29.5 PG (ref 26.6–33)
MCHC RBC AUTO-ENTMCNC: 34.3 G/DL (ref 31.5–35.7)
MCV RBC AUTO: 86.1 FL (ref 79–97)
MONOCYTES # BLD AUTO: 0.52 10*3/MM3 (ref 0.1–0.9)
MONOCYTES NFR BLD AUTO: 7.3 % (ref 5–12)
NEUTROPHILS NFR BLD AUTO: 3.81 10*3/MM3 (ref 1.7–7)
NEUTROPHILS NFR BLD AUTO: 53.3 % (ref 42.7–76)
NRBC BLD AUTO-RTO: 0 /100 WBC (ref 0–0.2)
PLATELET # BLD AUTO: 425 10*3/MM3 (ref 140–450)
PMV BLD AUTO: 11 FL (ref 6–12)
PROTHROMBIN TIME: 10.8 SECONDS (ref 9.6–11.7)
RBC # BLD AUTO: 4.98 10*6/MM3 (ref 3.77–5.28)
WBC NRBC COR # BLD AUTO: 7.14 10*3/MM3 (ref 3.4–10.8)

## 2024-01-18 PROCEDURE — 86003 ALLG SPEC IGE CRUDE XTRC EA: CPT

## 2024-01-18 PROCEDURE — 85610 PROTHROMBIN TIME: CPT | Performed by: INTERNAL MEDICINE

## 2024-01-18 PROCEDURE — 36415 COLL VENOUS BLD VENIPUNCTURE: CPT | Performed by: INTERNAL MEDICINE

## 2024-01-18 PROCEDURE — 86698 HISTOPLASMA ANTIBODY: CPT

## 2024-01-18 PROCEDURE — 87385 HISTOPLASMA CAPSUL AG IA: CPT

## 2024-01-18 PROCEDURE — 85730 THROMBOPLASTIN TIME PARTIAL: CPT | Performed by: INTERNAL MEDICINE

## 2024-01-18 PROCEDURE — 82785 ASSAY OF IGE: CPT

## 2024-01-18 PROCEDURE — 85025 COMPLETE CBC W/AUTO DIFF WBC: CPT

## 2024-01-18 RX ORDER — MULTIVITAMIN WITH IRON
1 TABLET ORAL DAILY
COMMUNITY

## 2024-01-21 ENCOUNTER — ANESTHESIA EVENT (OUTPATIENT)
Dept: GASTROENTEROLOGY | Facility: HOSPITAL | Age: 69
End: 2024-01-21
Payer: MEDICARE

## 2024-01-21 LAB
A ALTERNATA IGE QN: <0.1 KU/L
A FUMIGATUS IGE QN: <0.1 KU/L
BERMUDA GRASS IGE QN: <0.1 KU/L
BOXELDER IGE QN: <0.1 KU/L
C HERBARUM IGE QN: <0.1 KU/L
CALIF WALNUT POLN IGE QN: <0.1 KU/L
CAT DANDER IGE QN: <0.1 KU/L
CMN PIGWEED IGE QN: <0.1 KU/L
COMMON RAGWEED IGE QN: <0.1 KU/L
CONV CLASS DESCRIPTION: NORMAL
COTTONWOOD IGE QN: <0.1 KU/L
D FARINAE IGE QN: <0.1 KU/L
D PTERONYSS IGE QN: <0.1 KU/L
DOG DANDER IGE QN: <0.1 KU/L
H CAPSUL AB TITR SER ID: NEGATIVE {TITER}
IGE SERPL-ACNC: 13 IU/ML (ref 6–495)
LONDON PLANE IGE QN: <0.1 KU/L
MOUSE URINE PROT IGE QN: <0.1 KU/L
MT JUNIPER IGE QN: <0.1 KU/L
P NOTATUM IGE QN: <0.1 KU/L
PECAN/HICK TREE IGE QN: <0.1 KU/L
ROACH IGE QN: <0.1 KU/L
SALTWORT IGE QN: <0.1 KU/L
SHEEP SORREL IGE QN: <0.1 KU/L
SILVER BIRCH IGE QN: <0.1 KU/L
TIMOTHY IGE QN: <0.1 KU/L
WHITE ASH IGE QN: <0.1 KU/L
WHITE ELM IGE QN: <0.1 KU/L
WHITE MULBERRY IGE QN: <0.1 KU/L
WHITE OAK IGE QN: <0.1 KU/L

## 2024-01-21 RX ORDER — SODIUM CHLORIDE 0.9 % (FLUSH) 0.9 %
10 SYRINGE (ML) INJECTION EVERY 12 HOURS SCHEDULED
Status: CANCELLED | OUTPATIENT
Start: 2024-01-21

## 2024-01-21 RX ORDER — SODIUM CHLORIDE 9 MG/ML
9 INJECTION, SOLUTION INTRAVENOUS CONTINUOUS PRN
Status: CANCELLED | OUTPATIENT
Start: 2024-01-21

## 2024-01-21 RX ORDER — SODIUM CHLORIDE 0.9 % (FLUSH) 0.9 %
10 SYRINGE (ML) INJECTION AS NEEDED
Status: CANCELLED | OUTPATIENT
Start: 2024-01-21

## 2024-01-22 ENCOUNTER — ANESTHESIA (OUTPATIENT)
Dept: GASTROENTEROLOGY | Facility: HOSPITAL | Age: 69
End: 2024-01-22
Payer: MEDICARE

## 2024-01-22 ENCOUNTER — HOSPITAL ENCOUNTER (OUTPATIENT)
Facility: HOSPITAL | Age: 69
Setting detail: HOSPITAL OUTPATIENT SURGERY
Discharge: HOME | End: 2024-01-22
Attending: INTERNAL MEDICINE | Admitting: INTERNAL MEDICINE
Payer: MEDICARE

## 2024-01-22 VITALS
HEART RATE: 74 BPM | RESPIRATION RATE: 18 BRPM | HEIGHT: 67 IN | WEIGHT: 207.01 LBS | DIASTOLIC BLOOD PRESSURE: 69 MMHG | BODY MASS INDEX: 32.49 KG/M2 | OXYGEN SATURATION: 94 % | TEMPERATURE: 97.9 F | SYSTOLIC BLOOD PRESSURE: 116 MMHG

## 2024-01-22 DIAGNOSIS — J45.909 ASTHMA: ICD-10-CM

## 2024-01-22 DIAGNOSIS — R05.9 COUGH: ICD-10-CM

## 2024-01-22 PROCEDURE — 25010000002 PROPOFOL 1000 MG/100ML EMULSION: Performed by: NURSE ANESTHETIST, CERTIFIED REGISTERED

## 2024-01-22 PROCEDURE — 87385 HISTOPLASMA CAPSUL AG IA: CPT | Performed by: INTERNAL MEDICINE

## 2024-01-22 PROCEDURE — 87305 ASPERGILLUS AG IA: CPT | Performed by: INTERNAL MEDICINE

## 2024-01-22 PROCEDURE — 87071 CULTURE AEROBIC QUANT OTHER: CPT | Performed by: INTERNAL MEDICINE

## 2024-01-22 PROCEDURE — 87205 SMEAR GRAM STAIN: CPT | Performed by: INTERNAL MEDICINE

## 2024-01-22 PROCEDURE — 88108 CYTOPATH CONCENTRATE TECH: CPT | Performed by: INTERNAL MEDICINE

## 2024-01-22 PROCEDURE — 25810000003 SODIUM CHLORIDE 0.9 % SOLUTION: Performed by: NURSE ANESTHETIST, CERTIFIED REGISTERED

## 2024-01-22 PROCEDURE — 87102 FUNGUS ISOLATION CULTURE: CPT | Performed by: INTERNAL MEDICINE

## 2024-01-22 PROCEDURE — 87798 DETECT AGENT NOS DNA AMP: CPT | Performed by: INTERNAL MEDICINE

## 2024-01-22 PROCEDURE — 87252 VIRUS INOCULATION TISSUE: CPT | Performed by: INTERNAL MEDICINE

## 2024-01-22 PROCEDURE — 87206 SMEAR FLUORESCENT/ACID STAI: CPT | Performed by: INTERNAL MEDICINE

## 2024-01-22 PROCEDURE — 0202U NFCT DS 22 TRGT SARS-COV-2: CPT | Performed by: INTERNAL MEDICINE

## 2024-01-22 PROCEDURE — 87116 MYCOBACTERIA CULTURE: CPT | Performed by: INTERNAL MEDICINE

## 2024-01-22 RX ORDER — LABETALOL HYDROCHLORIDE 5 MG/ML
INJECTION, SOLUTION INTRAVENOUS AS NEEDED
Status: DISCONTINUED | OUTPATIENT
Start: 2024-01-22 | End: 2024-01-22 | Stop reason: SURG

## 2024-01-22 RX ORDER — LIDOCAINE HYDROCHLORIDE 10 MG/ML
INJECTION, SOLUTION EPIDURAL; INFILTRATION; INTRACAUDAL; PERINEURAL AS NEEDED
Status: DISCONTINUED | OUTPATIENT
Start: 2024-01-22 | End: 2024-01-22 | Stop reason: SURG

## 2024-01-22 RX ORDER — SODIUM CHLORIDE 9 MG/ML
INJECTION, SOLUTION INTRAVENOUS CONTINUOUS PRN
Status: DISCONTINUED | OUTPATIENT
Start: 2024-01-22 | End: 2024-01-22 | Stop reason: SURG

## 2024-01-22 RX ORDER — PROPOFOL 10 MG/ML
INJECTION, EMULSION INTRAVENOUS AS NEEDED
Status: DISCONTINUED | OUTPATIENT
Start: 2024-01-22 | End: 2024-01-22 | Stop reason: SURG

## 2024-01-22 RX ORDER — LIDOCAINE HYDROCHLORIDE 20 MG/ML
JELLY TOPICAL AS NEEDED
Status: DISCONTINUED | OUTPATIENT
Start: 2024-01-22 | End: 2024-01-22 | Stop reason: HOSPADM

## 2024-01-22 RX ORDER — LIDOCAINE HYDROCHLORIDE 20 MG/ML
INJECTION, SOLUTION INFILTRATION; PERINEURAL AS NEEDED
Status: DISCONTINUED | OUTPATIENT
Start: 2024-01-22 | End: 2024-01-22 | Stop reason: HOSPADM

## 2024-01-22 RX ADMIN — Medication 10 MG: at 08:51

## 2024-01-22 RX ADMIN — LIDOCAINE HYDROCHLORIDE 50 MG: 10 INJECTION, SOLUTION EPIDURAL; INFILTRATION; INTRACAUDAL; PERINEURAL at 08:48

## 2024-01-22 RX ADMIN — SODIUM CHLORIDE: 9 INJECTION, SOLUTION INTRAVENOUS at 08:45

## 2024-01-22 RX ADMIN — PROPOFOL INJECTABLE EMULSION 150 MG: 10 INJECTION, EMULSION INTRAVENOUS at 08:48

## 2024-01-22 NOTE — ANESTHESIA POSTPROCEDURE EVALUATION
Patient: Adela Dowell    Procedure Summary       Date: 01/22/24 Room / Location: [facility] ENDOSCOPY 3 / [facility] ENDOSCOPY    Anesthesia Start: 0845 Anesthesia Stop: 0901    Procedure: BRONCHOSCOPY wiht bronchoalveolar lavage (Bronchus) Diagnosis:       Cough      Asthma      (Cough [R05.9])      (Asthma [J45.909])    Surgeons: Mohinder Louis MD Provider: Rajiv Vilchis MD    Anesthesia Type: general ASA Status: 3            Anesthesia Type: general    Vitals  Vitals Value Taken Time   /87 01/22/24 0911   Temp     Pulse 73 01/22/24 0915   Resp 24 01/22/24 0905   SpO2 91 % 01/22/24 0915   Vitals shown include unfiled device data.        Post Anesthesia Care and Evaluation    Patient location during evaluation: PACU  Patient participation: complete - patient participated  Level of consciousness: awake  Pain scale: See nurse's notes for pain score.  Pain management: adequate    Airway patency: patent  Anesthetic complications: No anesthetic complications  PONV Status: none  Cardiovascular status: acceptable  Respiratory status: acceptable and spontaneous ventilation  Hydration status: acceptable    Comments: Patient seen and examined postoperatively; vital signs stable; SpO2 greater than or equal to 90%; cardiopulmonary status stable; nausea/vomiting adequately controlled; pain adequately controlled; no apparent anesthesia complications; patient discharged from anesthesia care when discharge criteria were met

## 2024-01-22 NOTE — ANESTHESIA PREPROCEDURE EVALUATION
Anesthesia Evaluation     Patient summary reviewed and Nursing notes reviewed   history of anesthetic complications:  prolonged sedation  NPO Solid Status: > 8 hours  NPO Liquid Status: > 2 hours           Airway   Dental      Pulmonary    (+) asthma,  Cardiovascular     ECG reviewed    (+) hypertension      Neuro/Psych  (+) headaches, psychiatric history Anxiety and Depression  GI/Hepatic/Renal/Endo    (+) obesity, renal disease- stones    Musculoskeletal     Abdominal    Substance History      OB/GYN          Other   arthritis,     ROS/Med Hx Other: Additional History:  Interstitial lung dz    PSH:  SPLENECTOMY ROTATOR CUFF REPAIR  SINUS SURGERY TUBAL ABDOMINAL LIGATION  JOINT REPLACEMENT KIDNEY STONE SURGERY  TOTAL SHOULDER ARTHROPLASTY W/ DISTAL CLAVICLE EXCISION               Anesthesia Plan    ASA 3     general     (Patient identified; pre-operative vital signs, all relevant labs/studies, complete medical/surgical/anesthetic history, full medication list, full allergy list, and NPO status obtained/reviewed; physical assessment performed; anesthetic options, side effects, potential complications, risks, and benefits discussed; questions answered; written anesthesia consent obtained; patient cleared for procedure; anesthesia machine and equipment checked and functioning)  intravenous induction     Anesthetic plan, risks, benefits, and alternatives have been provided, discussed and informed consent has been obtained with: patient.    Plan discussed with CRNA and CAA.    CODE STATUS:

## 2024-01-22 NOTE — H&P
Patient Care Team:  Janet Mendieta DO as PCP - General (Family Medicine)  La Nena Dillard MD Tillett, Edward Dail, MD as Surgeon (Orthopedic Surgery)  Mannie Godinez MD as Consulting Physician (Family Medicine)  Heath Ryan MD as Consulting Physician (Urology)  Aureliano Roberts MD as Consulting Physician (Orthopedic Surgery)  Gerardo Palmer MD as Consulting Physician (Allergy)    Chief complaint chronic cough        Assessment & Plan   Chronic cough  Dyspnea on exertion  Groundglass opacities on CT scan of the chest: Histoplasma antibodies negative  Possible asthma: PFTs showed small airway disease: 1/18/2024 IgE 13 and negative area 5 test for allergens  Eosinophilia 370 on 1/18/2024    Plan:  Bronchoscopy with BAL from a left upper lobe lingula to rule out opportunistic infection.      History  68-year-old female who was seen in the outpatient clinic recently with complaints of shortness of breath and cough since September 2023 when she reported she had a shoulder surgery.  She had 2 rounds of antibiotics and steroids with only partial improvement.  She was using Breo inhaler but with no significant improvement.  CT scan of the chest showed groundglass opacities.  She is scheduled today for outpatient bronchoscopy to rule out opportunistic infections with plan to discharge home after the procedure and follow-up the results as an outpatient.      * No active hospital problems. *      Past Medical History:   Diagnosis Date    Anxiety     Arthritis     Asthma     Cough     Depression     Headache     Hypertension     no current meds    ILD (interstitial lung disease)     Kidney stone     Low back pain     Obesity     Presence of left artificial knee joint     Slow to wake up after anesthesia     Upper respiratory infection     Urinary tract infection        Past Surgical History:   Procedure Laterality Date    JOINT REPLACEMENT Left 2019    knee    KIDNEY STONE SURGERY       ROTATOR CUFF REPAIR Right 2011    x2    SINUS SURGERY Bilateral 2000    SPLENECTOMY      TOTAL SHOULDER ARTHROPLASTY W/ DISTAL CLAVICLE EXCISION Left 09/20/2023    Procedure: TOTAL SHOULDER REVERSE ARTHROPLASTY;  Surgeon: Aureliano Roberts MD;  Location: [facility] MAIN OR;  Service: Orthopedics;  Laterality: Left;    TUBAL ABDOMINAL LIGATION         Family History   Problem Relation Age of Onset    Other Mother         Alzheimer's    Cancer Father         bladder cancer    Diabetes Sister     Diabetes Brother     Thyroid disease Daughter     No Known Problems Maternal Grandmother     No Known Problems Maternal Grandfather     No Known Problems Paternal Grandmother     No Known Problems Paternal Grandfather        Social History     Socioeconomic History    Marital status:    Tobacco Use    Smoking status: Never     Passive exposure: Past    Smokeless tobacco: Never   Vaping Use    Vaping Use: Never used   Substance and Sexual Activity    Alcohol use: Never    Drug use: Never    Sexual activity: Not Currently     Partners: Male     Birth control/protection: Post-menopausal, Surgical     Comment: monogamous with        Review of Systems  Review of Systems   Constitutional: Negative for chills, fever and malaise/fatigue.   HENT: Negative.    Eyes: Negative.    Cardiovascular: Negative.    Respiratory: Positive for cough and shortness of breath.    Skin: Negative.    Musculoskeletal: Negative.    Gastrointestinal: Negative.    Genitourinary: Negative.    Neurological: Negative.    Psychiatric/Behavioral: Negative.  Vital Signs  Temp:  [97.9 °F (36.6 °C)] 97.9 °F (36.6 °C)  Heart Rate:  [67] 67  Resp:  [15] 15  BP: (151)/(92) 151/92    Physical Exam:  Physical Exam  General Appearance:  Alert   HEENT:  Normocephalic, without obvious abnormality, Conjunctiva/corneas clear,.   Nares normal, no drainage     Neck:  Supple, symmetrical, trachea midline. No JVD.  Lungs /Chest wall:   Good air entry bilaterally  "without wheezing or rhonchi, respirations unlabored, symmetrical wall movement.     Heart:  Regular rate and rhythm, S1 S2 normal  Abdomen: Soft, non-tender, no masses, no organomegaly.    Extremities: No edema, no clubbing or cyanosis    Radiology  Imaging Results (Last 24 Hours)       ** No results found for the last 24 hours. **            Labs:  Results from last 7 days   Lab Units 01/18/24  1054   WBC 10*3/mm3 7.14   HEMOGLOBIN g/dL 14.7   HEMATOCRIT % 42.9   PLATELETS 10*3/mm3 425           Invalid input(s): \"LABALBU\", \"PROT\"                      Results from last 7 days   Lab Units 01/18/24  1054   INR  0.99   APTT seconds 28.7               Meds:   SCHEDULE    Infusions  No current facility-administered medications for this encounter.    PRNs        I discussed the patient's findings and my recommendations with patient, family, and nursing staff.     Mohinder Louis MD  01/22/24  08:36 EST      "

## 2024-01-22 NOTE — DISCHARGE INSTRUCTIONS
Do not drink alcohol, drive, operate any heavy machinery or power tools, or make any important/legal decisions for the next 24 hours.    Call you doctor immediately if you experience severe chest pain, shortness of breath, bleeding or coughing up blood, or fever over 101 F.    Diet: Nothing by mouth until 1050 AM today.     After a bronchoscopy, you may experience a scratchy throat and cough. This will gradually get better over the course of the day. You may gargle with warm salt water for this after the time noted above is over.     A responsible adult should stay with you and you should rest quietly for the rest of the day. Follow up with MD as instructed.

## 2024-01-23 LAB
LAB AP CASE REPORT: NORMAL
PATH REPORT.FINAL DX SPEC: NORMAL
PATH REPORT.GROSS SPEC: NORMAL

## 2024-01-24 LAB
BACTERIA SPEC AEROBE CULT: NORMAL
GRAM STN SPEC: NORMAL

## 2024-01-24 NOTE — OP NOTE
Bronchoscopy Procedure Note    Procedure:  Bronchoscopy, Diagnostic  Bronchoscopy, Therapeutic  Bronchoalveolar lavage, BAL    Pre-Operative Diagnosis: Chronic cough, multiple mucous plugs    Post-Operative Diagnosis: Same    Indication: Chronic cough, multiple mucous plugs    Anesthesia: Monitored Anesthesia Care (MAC)    Procedure Details: Patient was consented for the procedure with all risk and benefit of the procedure explained in detail.  Patient was given the opportunity to ask questions and all concerns were answered.    Timeout was done in the standard manner   the bronchoscope was inserted into the main airway via the oropharynx. An anatomical survey was done of the main airways and the subsegmental bronchus of the 5 lobes.  The findings are consistent of normal-appearing functioning vocal cords, multiple clear-colored mucous plugs from 5 lobes but otherwise no endobronchial lesions.  A therapeutic lavage was performed using aliquots of normal saline instilled into the airways then aspirated back until clear.  BAL from left upper lobe lingula was performed by instilling 90 mL of sterile normal saline and return of 40 mL.    Estimated Blood Loss: None           Specimens: BAL from left upper lobe                Complications:  None; patient tolerated the procedure well.           Disposition: PACU - hemodynamically stable.    Post op plan:  Resume p.o. after 2 hours  Follow-up results    Patient tolerated the procedure well.

## 2024-01-26 ENCOUNTER — HOSPITAL ENCOUNTER (OUTPATIENT)
Dept: MAMMOGRAPHY | Facility: HOSPITAL | Age: 69
Discharge: HOME | End: 2024-01-26
Admitting: STUDENT IN AN ORGANIZED HEALTH CARE EDUCATION/TRAINING PROGRAM
Payer: MEDICARE

## 2024-01-26 DIAGNOSIS — Z12.31 ENCOUNTER FOR SCREENING MAMMOGRAM FOR MALIGNANT NEOPLASM OF BREAST: ICD-10-CM

## 2024-01-26 LAB
H CAPSUL AG UR QL IA: NEGATIVE
P JIROVECII DNA L RESP QL NAA+NON-PROBE: NEGATIVE
REF LAB TEST METHOD: NORMAL

## 2024-01-26 PROCEDURE — 77063 BREAST TOMOSYNTHESIS BI: CPT

## 2024-01-26 PROCEDURE — 77067 SCR MAMMO BI INCL CAD: CPT

## 2024-01-31 LAB — VIRUS SPEC CULT: NORMAL

## 2024-02-05 ENCOUNTER — TRANSCRIBE ORDERS (OUTPATIENT)
Dept: ADMINISTRATIVE | Facility: HOSPITAL | Age: 69
End: 2024-02-05
Payer: MEDICARE

## 2024-02-05 DIAGNOSIS — R06.02 SHORTNESS OF BREATH: Primary | ICD-10-CM

## 2024-02-19 ENCOUNTER — HOSPITAL ENCOUNTER (OUTPATIENT)
Dept: RESPIRATORY THERAPY | Facility: HOSPITAL | Age: 69
Discharge: HOME | End: 2024-02-19
Admitting: INTERNAL MEDICINE
Payer: MEDICARE

## 2024-02-19 VITALS — HEART RATE: 72 BPM | RESPIRATION RATE: 16 BRPM | OXYGEN SATURATION: 97 %

## 2024-02-19 DIAGNOSIS — R06.02 SHORTNESS OF BREATH: ICD-10-CM

## 2024-02-19 LAB — FUNGUS WND CULT: NORMAL

## 2024-02-19 PROCEDURE — 94726 PLETHYSMOGRAPHY LUNG VOLUMES: CPT

## 2024-02-19 PROCEDURE — 94060 EVALUATION OF WHEEZING: CPT

## 2024-02-19 PROCEDURE — 94799 UNLISTED PULMONARY SVC/PX: CPT

## 2024-02-19 PROCEDURE — 94729 DIFFUSING CAPACITY: CPT

## 2024-02-19 PROCEDURE — 94664 DEMO&/EVAL PT USE INHALER: CPT

## 2024-02-19 RX ORDER — ALBUTEROL SULFATE 90 UG/1
2 AEROSOL, METERED RESPIRATORY (INHALATION) ONCE
Status: COMPLETED | OUTPATIENT
Start: 2024-02-19 | End: 2024-02-19

## 2024-02-19 RX ADMIN — ALBUTEROL SULFATE 2 PUFF: 108 AEROSOL, METERED RESPIRATORY (INHALATION) at 15:55

## 2024-02-26 ENCOUNTER — TELEPHONE (OUTPATIENT)
Dept: FAMILY MEDICINE CLINIC | Facility: CLINIC | Age: 69
End: 2024-02-26
Payer: MEDICARE

## 2024-02-26 NOTE — TELEPHONE ENCOUNTER
Mr Delmer came in regards to his wife not getting any better with her pulmonary issues that she started having after her surgery in September last year, he wants to get her info and test results that she's had from dr stanton's office sent to [facility]. [facility] Info phone [phone] fax [phone]

## 2024-02-27 NOTE — TELEPHONE ENCOUNTER
As long as we have patient's permission to do this, I do not mind sending the stuff over.  I am assuming they are organizing and scheduling their own appointment with [facility].

## 2024-02-27 NOTE — TELEPHONE ENCOUNTER
"Spoke to pt 02/27/2024, 12:46 am pt states she needs records went to [facility] to schedule an appt.    Pt gave verbal \"OK\" to fax records    Records faxed 02/27/2024  "

## 2024-03-01 ENCOUNTER — TELEPHONE (OUTPATIENT)
Dept: FAMILY MEDICINE CLINIC | Facility: CLINIC | Age: 69
End: 2024-03-01
Payer: MEDICARE

## 2024-03-01 DIAGNOSIS — R05.3 CHRONIC COUGH: ICD-10-CM

## 2024-03-01 DIAGNOSIS — R06.02 SOB (SHORTNESS OF BREATH): Primary | ICD-10-CM

## 2024-03-01 NOTE — TELEPHONE ENCOUNTER
Spoke to pt 03/01/2024, 3:22 pm advised pt when I spoke to [facility] they stated they had to review notes before referral.    Please order referral to [place], let me know so I can fax referral to them

## 2024-03-01 NOTE — TELEPHONE ENCOUNTER
Mrs phipps called asking if we could send a referral to [facility], they got her records but were requesting to have a referral         Fax [phone]           Phone [phone]

## 2024-03-04 ENCOUNTER — OFFICE VISIT (OUTPATIENT)
Dept: ORTHOPEDIC SURGERY | Facility: CLINIC | Age: 69
End: 2024-03-04
Payer: MEDICARE

## 2024-03-04 VITALS — BODY MASS INDEX: 32.49 KG/M2 | HEIGHT: 67 IN | WEIGHT: 207 LBS | HEART RATE: 72 BPM

## 2024-03-04 DIAGNOSIS — M19.012 OSTEOARTHRITIS OF LEFT GLENOHUMERAL JOINT: ICD-10-CM

## 2024-03-04 DIAGNOSIS — Z47.89 ORTHOPEDIC AFTERCARE: Primary | ICD-10-CM

## 2024-03-04 LAB
MYCOBACTERIUM SPEC CULT: NORMAL
NIGHT BLUE STAIN TISS: NORMAL

## 2024-03-04 PROCEDURE — 99212 OFFICE O/P EST SF 10 MIN: CPT | Performed by: ORTHOPAEDIC SURGERY

## 2024-03-04 NOTE — TELEPHONE ENCOUNTER
Sorry, the way that they had phrased it it sounded like they had gotten a referral already (I had thought from another provider possibly from pulmonology).  We got a fax from [facility] (their fax number [phone]) requesting referral.  Placing referral to [facility] per patient request    Patient had severe shortness of breath despite several steroid and inhaler treatments since October 2023.  She was referred to pulmonology, who she saw on 1/22/2024, this pulmonologist also performed a BAL.  Patient and  would like second opinion and referral to [facility].  Placing referral to [facility]

## 2024-03-04 NOTE — PROGRESS NOTES
"     Patient ID: Adela Dowell is a 68 y.o. female.  9/20/23 left reverse total arthroplasty    No pain  Review of Systems:        Objective:    Pulse 72   Ht 170.2 cm (67\")   Wt 93.9 kg (207 lb)   BMI 32.42 kg/m²     Physical Examination:     Incision healed active elevation 180 abduction 140 external rotation 40 internal rotation L5    Imaging:   left Shoulder X-Ray  Indication: Postop total shoulder  AP Y and Lateral views  Findings: Reverse total shoulder in position  no bony lesion  Soft tissues normal  normal joint spaces  Hardware appropriately positioned yes      yes prior studies available for comparison.    Assessment:    Doing well after shoulder arthroplasty    Plan:   Activity as tolerated x-ray in 6 months      Procedures          Disclaimer: Part of this note may be an electronic transcription/translation of spoken language to printed text using the Dragon Dictation System  "

## 2024-03-07 ENCOUNTER — TELEPHONE (OUTPATIENT)
Dept: FAMILY MEDICINE CLINIC | Facility: CLINIC | Age: 69
End: 2024-03-07
Payer: MEDICARE

## 2024-04-12 NOTE — PROGRESS NOTES
The ABCs of the Annual Wellness Visit  Subsequent Medicare Wellness Visit    Subjective      Adela Dowell is a 69 y.o. female who presents for a Subsequent Medicare Wellness Visit.    The following portions of the patient's history were reviewed and   updated as appropriate: allergies, current medications, past family history, past medical history, past social history, past surgical history, and problem list.    Compared to one year ago, the patient feels her physical   health is better.    Compared to one year ago, the patient feels her mental   health is better.      Recent Hospitalizations:  She was admitted within the past 365 days at [facility].       Current Medical Providers:  Patient Care Team:  Janet Mendieta DO as PCP - General (Family Medicine)  Milton Castro MD as Surgeon (Orthopedic Surgery)  Heath Ryan MD as Consulting Physician (Urology)  Aureliano Roberts MD as Consulting Physician (Orthopedic Surgery)  Mohinder Louis MD as Consulting Physician (Pulmonary Disease)    Outpatient Medications Prior to Visit   Medication Sig Dispense Refill    ACIDOPHILUS LACTOBACILLUS PO Take 1 capsule by mouth Daily.      aspirin-acetaminophen-caffeine (EXCEDRIN MIGRAINE) 250-250-65 MG per tablet Take 2 tablets by mouth Every 8 (Eight) Hours As Needed for Headache.      Calcium-Magnesium-Vitamin D (CALCIUM 1200+D3 PO)       FLUoxetine (PROzac) 20 MG capsule Take 1 capsule by mouth Daily.      Magnesium 250 MG tablet Take 1 tablet by mouth Daily.      Misc Natural Products (OSTEO BI-FLEX ADV TRIPLE ST PO) Take 2 capsules by mouth Daily.      MULTIPLE MINERALS-VITAMINS PO Take  by mouth.      Omega-3 Fatty Acids (fish oil) 1000 MG capsule capsule Take 1 capsule by mouth 2 (Two) Times a Day With Meals.      sodium chloride (NS) 0.9 % irrigation       vitamin D3 125 MCG (5000 UT) capsule capsule Take 125 mcg by mouth Daily.      Melatonin 1 MG capsule Take 1 capsule by mouth Every Night.    "    No facility-administered medications prior to visit.       No opioid medication identified on active medication list. I have reviewed chart for other potential  high risk medication/s and harmful drug interactions in the elderly.        Aspirin is not on active medication list.  Aspirin use is not indicated based on review of current medical condition/s. Risk of harm outweighs potential benefits.  .    Patient Active Problem List   Diagnosis    Hypertension    Disorder of bursae and tendons in shoulder region    Chronic headaches    Arthritis    Anemia    H/O splenectomy    Osteopenia of lumbar spine    Depressive disorder    Class 1 obesity due to excess calories with body mass index (BMI) of 32.0 to 32.9 in adult    History of kidney stones    Osteoarthritis of left glenohumeral joint    DJD of left shoulder    Anxiety     Advance Care Planning   Advance Care Planning     Advance Directive is not on file.  ACP discussion was held with the patient during this visit. Patient does not have an advance directive, information provided.     Objective    Vitals:    24 0925   BP: 130/68   BP Location: Right arm   Patient Position: Sitting   Cuff Size: Adult   Pulse: 73   Resp: 16   Temp: 97.3 °F (36.3 °C)   TempSrc: Skin   SpO2: 96%   Weight: 92.8 kg (204 lb 9.6 oz)   Height: 170.2 cm (67\")     Estimated body mass index is 32.04 kg/m² as calculated from the following:    Height as of this encounter: 170.2 cm (67\").    Weight as of this encounter: 92.8 kg (204 lb 9.6 oz).           Does the patient have evidence of cognitive impairment?   No    - ACE III minico/30            HEALTH RISK ASSESSMENT    Smoking Status:  Social History     Tobacco Use   Smoking Status Never    Passive exposure: Past   Smokeless Tobacco Never     Alcohol Consumption:  Social History     Substance and Sexual Activity   Alcohol Use Never     Fall Risk Screen:    STEADI Fall Risk Assessment was completed, and patient is at LOW risk " for falls.Assessment completed on:2024    Depression Screenin/16/2024     9:00 AM   PHQ-2/PHQ-9 Depression Screening   Little Interest or Pleasure in Doing Things 0-->not at all   Feeling Down, Depressed or Hopeless 0-->not at all   Trouble Falling or Staying Asleep, or Sleeping Too Much 0-->not at all   Feeling Tired or Having Little Energy 0-->not at all   Poor Appetite or Overeating 0-->not at all   Feeling Bad about Yourself - or that You are a Failure or Have Let Yourself or Your Family Down 0-->not at all   Trouble Concentrating on Things, Such as Reading the Newspaper or Watching Television 0-->not at all   Moving or Speaking So Slowly that Other People Could Have Noticed? Or the Opposite - Being So Fidgety 0-->not at all   Thoughts that You Would be Better Off Dead or of Hurting Yourself in Some Way 0-->not at all   PHQ-9: Brief Depression Severity Measure Score 0   If You Checked Off Any Problems, How Difficult Have These Problems Made It For You to Do Your Work, Take Care of Things at Home, or Get Along with Other People? not difficult at all       Health Habits and Functional and Cognitive Screenin/15/2024     8:52 PM   Functional & Cognitive Status   Do you have difficulty preparing food and eating? No   Do you have difficulty bathing yourself, getting dressed or grooming yourself? No   Do you have difficulty using the toilet? No   Do you have difficulty moving around from place to place? No   Do you have trouble with steps or getting out of a bed or a chair? No   Current Diet Well Balanced Diet   Dental Exam Up to date   Eye Exam Up to date   Exercise (times per week) 4 times per week   Current Exercises Include Gardening;Hiking;House Cleaning;Walking;Yard Work   Do you need help using the phone?  No   Are you deaf or do you have serious difficulty hearing?  No   Do you need help to go to places out of walking distance? No   Do you need help shopping? No   Do you need help  preparing meals?  No   Do you need help with housework?  No   Do you need help with laundry? No   Do you need help taking your medications? No   Do you need help managing money? No   Do you ever drive or ride in a car without wearing a seat belt? No   Have you felt unusual stress, anger or loneliness in the last month? No   Who do you live with? Spouse   If you need help, do you have trouble finding someone available to you? No   Have you been bothered in the last four weeks by sexual problems? No   Do you have difficulty concentrating, remembering or making decisions? No       Age-appropriate Screening Schedule:  Refer to the list below for future screening recommendations based on patient's age, sex and/or medical conditions. Orders for these recommended tests are listed in the plan section. The patient has been provided with a written plan.    Health Maintenance   Topic Date Due    LIPID PANEL  04/04/2024    RSV Vaccine - Adults (1 - 1-dose 60+ series) 05/16/2024 (Originally 4/11/2017)    BMI FOLLOWUP  07/31/2024    INFLUENZA VACCINE  08/01/2024    Pneumococcal Vaccine 65+ (3 of 3 - PPSV23 or PCV20) 09/23/2024    ANNUAL WELLNESS VISIT  04/16/2025    DXA SCAN  12/07/2025    MAMMOGRAM  01/26/2026    COLORECTAL CANCER SCREENING  05/01/2029    TDAP/TD VACCINES (5 - Td or Tdap) 09/23/2029    HEPATITIS C SCREENING  Completed    COVID-19 Vaccine  Completed    ZOSTER VACCINE  Completed                  CMS Preventative Services Quick Reference  Risk Factors Identified During Encounter:    Immunizations Discussed/Encouraged: RSV (Respiratory Syncytial Virus)    The above risks/problems have been discussed with the patient.  Pertinent information has been shared with the patient in the After Visit Summary.    Diagnoses and all orders for this visit:    1. Medicare annual wellness visit, subsequent (Primary)  -Patient has a copy of ACP paperwork at home.  Patient will later drop the paperwork off at the  to scan  into the chart once completed  - Discussed RSV vaccine, pt will consider  -Pertinent screening labs ordered per below      2. Laryngospasm/Chronic cough/SOB (shortness of breath)  -     Ambulatory Referral to Respiratory Therapy: Made for [facility] respiratory therapy    5. Drug side effects  -     Comprehensive metabolic panel    6. Elevated cholesterol  -     Lipid panel    7. Thyroid disorder screen  -     TSH Rfx On Abnormal To Free T4    8. Elevated glucose  -     Hemoglobin A1c    9. Vitamin D deficiency  -     Vitamin D,25-Hydroxy    10. Class 1 obesity due to excess calories without serious comorbidity with body mass index (BMI) of 32.0 to 32.9 in adult  -Information about nutrition and standardized exercise recommendations added to patient's after visit summary        Follow Up:   Next Medicare Wellness visit to be scheduled in 1 year.      An After Visit Summary and PPPS were made available to the patient.

## 2024-04-16 ENCOUNTER — OFFICE VISIT (OUTPATIENT)
Dept: FAMILY MEDICINE CLINIC | Facility: CLINIC | Age: 69
End: 2024-04-16
Payer: MEDICARE

## 2024-04-16 VITALS
SYSTOLIC BLOOD PRESSURE: 130 MMHG | BODY MASS INDEX: 32.11 KG/M2 | RESPIRATION RATE: 16 BRPM | WEIGHT: 204.6 LBS | TEMPERATURE: 97.3 F | HEIGHT: 67 IN | HEART RATE: 73 BPM | OXYGEN SATURATION: 96 % | DIASTOLIC BLOOD PRESSURE: 68 MMHG

## 2024-04-16 DIAGNOSIS — Z13.29 THYROID DISORDER SCREEN: ICD-10-CM

## 2024-04-16 DIAGNOSIS — Z00.00 MEDICARE ANNUAL WELLNESS VISIT, SUBSEQUENT: Primary | ICD-10-CM

## 2024-04-16 DIAGNOSIS — R73.09 ELEVATED GLUCOSE: ICD-10-CM

## 2024-04-16 DIAGNOSIS — T88.7XXA DRUG SIDE EFFECTS: ICD-10-CM

## 2024-04-16 DIAGNOSIS — R06.02 SOB (SHORTNESS OF BREATH): ICD-10-CM

## 2024-04-16 DIAGNOSIS — E66.09 CLASS 1 OBESITY DUE TO EXCESS CALORIES WITHOUT SERIOUS COMORBIDITY WITH BODY MASS INDEX (BMI) OF 32.0 TO 32.9 IN ADULT: ICD-10-CM

## 2024-04-16 DIAGNOSIS — E55.9 VITAMIN D DEFICIENCY: ICD-10-CM

## 2024-04-16 DIAGNOSIS — E78.00 ELEVATED CHOLESTEROL: ICD-10-CM

## 2024-04-16 DIAGNOSIS — R05.3 CHRONIC COUGH: ICD-10-CM

## 2024-04-16 DIAGNOSIS — J38.5 LARYNGOSPASM: ICD-10-CM

## 2024-04-16 PROCEDURE — 1170F FXNL STATUS ASSESSED: CPT | Performed by: STUDENT IN AN ORGANIZED HEALTH CARE EDUCATION/TRAINING PROGRAM

## 2024-04-16 PROCEDURE — 99213 OFFICE O/P EST LOW 20 MIN: CPT | Performed by: STUDENT IN AN ORGANIZED HEALTH CARE EDUCATION/TRAINING PROGRAM

## 2024-04-16 PROCEDURE — G0439 PPPS, SUBSEQ VISIT: HCPCS | Performed by: STUDENT IN AN ORGANIZED HEALTH CARE EDUCATION/TRAINING PROGRAM

## 2024-04-16 PROCEDURE — 3078F DIAST BP <80 MM HG: CPT | Performed by: STUDENT IN AN ORGANIZED HEALTH CARE EDUCATION/TRAINING PROGRAM

## 2024-04-16 PROCEDURE — 3075F SYST BP GE 130 - 139MM HG: CPT | Performed by: STUDENT IN AN ORGANIZED HEALTH CARE EDUCATION/TRAINING PROGRAM

## 2024-04-16 RX ORDER — MAGNESIUM HYDROXIDE 1200 MG/15ML
LIQUID ORAL
COMMUNITY
Start: 2024-03-28

## 2024-04-16 NOTE — PROGRESS NOTES
Subjective   Adela Dowell is a 69 y.o. female.   Chief Complaint   Patient presents with    Medicare Wellness-subsequent       History of Present Illness       Shortness of breath/laryngeal spasm  -Patient went to [facility] and saw Dr. Livan Allen (pulmonology)  -He told patient that what she has is something that does not need to be chronic.  She was told that there are several other patients with similar presentations after COVID had hit.  This has to do with the larynx spasming and patient has to consciously retrain herself how to breathe appropriately  -He was suggesting that patient complete respiratory therapy and patient needs a referral for this    The following portions of the patient's history were reviewed and updated as appropriate: allergies, current medications, past family history, past medical history, past social history, past surgical history, and problem list.    Patient Active Problem List   Diagnosis    Hypertension    Disorder of bursae and tendons in shoulder region    Chronic headaches    Arthritis    Anemia    H/O splenectomy    Osteopenia of lumbar spine    Depressive disorder    Class 1 obesity due to excess calories with body mass index (BMI) of 32.0 to 32.9 in adult    History of kidney stones    Osteoarthritis of left glenohumeral joint    DJD of left shoulder    Anxiety       Current Outpatient Medications on File Prior to Visit   Medication Sig Dispense Refill    ACIDOPHILUS LACTOBACILLUS PO Take 1 capsule by mouth Daily.      aspirin-acetaminophen-caffeine (EXCEDRIN MIGRAINE) 250-250-65 MG per tablet Take 2 tablets by mouth Every 8 (Eight) Hours As Needed for Headache.      Calcium-Magnesium-Vitamin D (CALCIUM 1200+D3 PO)       FLUoxetine (PROzac) 20 MG capsule Take 1 capsule by mouth Daily.      Magnesium 250 MG tablet Take 1 tablet by mouth Daily.      Misc Natural Products (OSTEO BI-FLEX ADV TRIPLE ST PO) Take 2 capsules by mouth Daily.      MULTIPLE MINERALS-VITAMINS PO Take  " by mouth.      Omega-3 Fatty Acids (fish oil) 1000 MG capsule capsule Take 1 capsule by mouth 2 (Two) Times a Day With Meals.      sodium chloride (NS) 0.9 % irrigation       vitamin D3 125 MCG (5000 UT) capsule capsule Take 125 mcg by mouth Daily.      [DISCONTINUED] Melatonin 1 MG capsule Take 1 capsule by mouth Every Night.       No current facility-administered medications on file prior to visit.     Current outpatient and discharge medications have been reconciled for the patient.  Reviewed by: Janet Mendieta DO      Allergies   Allergen Reactions    Levofloxacin Swelling and Myalgia     Joint swelling         Objective   Visit Vitals  /68 (BP Location: Right arm, Patient Position: Sitting, Cuff Size: Adult)   Pulse 73   Temp 97.3 °F (36.3 °C) (Skin)   Resp 16   Ht 170.2 cm (67\")   Wt 92.8 kg (204 lb 9.6 oz)   SpO2 96%   BMI 32.04 kg/m²       Physical Exam  HENT:      Head: Normocephalic and atraumatic.   Eyes:      Conjunctiva/sclera: Conjunctivae normal.   Neurological:      General: No focal deficit present.      Mental Status: She is alert and oriented to person, place, and time.   Psychiatric:         Mood and Affect: Mood normal.         Behavior: Behavior normal.           Diagnoses and all orders for this visit:    Laryngospasm/Chronic cough/SOB (shortness of breath)  -     Ambulatory Referral to Respiratory Therapy: Made for [facility] respiratory therapy  -Patient will continue follow-up with  at [facility] for now              Expected course, medications, and adverse effects discussed as appropriate.  Call or return if worsening or persistent symptoms.  Hand hygiene was performed before donning protective equipment and after removal when leaving the room.    This document is intended for medical expert use only. Reading of this document by patients and/or patient's family without participating medical staff guidance may result in misinterpretation and unintended " morbidity. Any interpretation of such data is the responsibility of the patient and/or family member responsible for the patient in concert with their primary or specialist providers, not to be left for sources of online searches such as [employer], [employer] or similar queries. Relying on these approaches to knowledge may result in misinterpretation, misguided goals of care and even death should patients or family members try recommendations outside of the realm of professional medical care.

## 2024-04-27 LAB
25(OH)D3+25(OH)D2 SERPL-MCNC: 103 NG/ML (ref 30–100)
ALBUMIN SERPL-MCNC: 4.5 G/DL (ref 3.9–4.9)
ALBUMIN/GLOB SERPL: 1.9 {RATIO} (ref 1.2–2.2)
ALP SERPL-CCNC: 167 IU/L (ref 44–121)
ALT SERPL-CCNC: 9 IU/L (ref 0–32)
AST SERPL-CCNC: 29 IU/L (ref 0–40)
BILIRUB SERPL-MCNC: 0.6 MG/DL (ref 0–1.2)
BUN SERPL-MCNC: 16 MG/DL (ref 8–27)
BUN/CREAT SERPL: 18 (ref 12–28)
CALCIUM SERPL-MCNC: 9.6 MG/DL (ref 8.7–10.3)
CHLORIDE SERPL-SCNC: 101 MMOL/L (ref 96–106)
CHOLEST SERPL-MCNC: 210 MG/DL (ref 100–199)
CO2 SERPL-SCNC: 22 MMOL/L (ref 20–29)
CREAT SERPL-MCNC: 0.91 MG/DL (ref 0.57–1)
EGFRCR SERPLBLD CKD-EPI 2021: 69 ML/MIN/1.73
GLOBULIN SER CALC-MCNC: 2.4 G/DL (ref 1.5–4.5)
GLUCOSE SERPL-MCNC: 93 MG/DL (ref 70–99)
HBA1C MFR BLD: 5.9 % (ref 4.8–5.6)
HDLC SERPL-MCNC: 67 MG/DL
LDLC SERPL CALC-MCNC: 129 MG/DL (ref 0–99)
POTASSIUM SERPL-SCNC: 4.9 MMOL/L (ref 3.5–5.2)
PROT SERPL-MCNC: 6.9 G/DL (ref 6–8.5)
SODIUM SERPL-SCNC: 141 MMOL/L (ref 134–144)
TRIGL SERPL-MCNC: 81 MG/DL (ref 0–149)
TSH SERPL DL<=0.005 MIU/L-ACNC: 2.09 UIU/ML (ref 0.45–4.5)
VLDLC SERPL CALC-MCNC: 14 MG/DL (ref 5–40)

## 2024-09-09 ENCOUNTER — OFFICE VISIT (OUTPATIENT)
Dept: ORTHOPEDIC SURGERY | Facility: CLINIC | Age: 69
End: 2024-09-09
Payer: MEDICARE

## 2024-09-09 VITALS — HEART RATE: 66 BPM | HEIGHT: 67 IN | BODY MASS INDEX: 32.02 KG/M2 | OXYGEN SATURATION: 97 % | WEIGHT: 204 LBS

## 2024-09-09 DIAGNOSIS — M19.012 OSTEOARTHRITIS OF LEFT GLENOHUMERAL JOINT: ICD-10-CM

## 2024-09-09 DIAGNOSIS — Z47.89 ORTHOPEDIC AFTERCARE: Primary | ICD-10-CM

## 2024-09-09 PROCEDURE — 99212 OFFICE O/P EST SF 10 MIN: CPT | Performed by: ORTHOPAEDIC SURGERY

## 2024-09-09 NOTE — PROGRESS NOTES
"     Patient ID: Adela Dowell is a 69 y.o. female.  9/20/23 left reverse total arthroplasty    No pain  Review of Systems:        Objective:    Pulse 66   Ht 170.2 cm (67\")   Wt 92.5 kg (204 lb)   SpO2 97%   BMI 31.95 kg/m²     Physical Examination:     Left shoulder intact skin healed incision no tenderness active elevation 170 abduction 140 external tension 50 internal rotation S1    Imaging:   left Shoulder X-Ray  Indication: Postop total shoulder  AP Y and Lateral views  Findings: Reverse total shoulder in position  no bony lesion  Soft tissues normal  normal joint spaces  Hardware appropriately positioned yes      yes prior studies available for comparison.    Assessment:    Doing well after shoulder replacement    Plan:   Activity as tolerated and see me as needed      Procedures          Disclaimer: Part of this note may be an electronic transcription/translation of spoken language to printed text using the Dragon Dictation System  "

## 2024-10-15 ENCOUNTER — TELEPHONE (OUTPATIENT)
Dept: FAMILY MEDICINE CLINIC | Facility: CLINIC | Age: 69
End: 2024-10-15
Payer: MEDICARE

## 2024-10-15 NOTE — TELEPHONE ENCOUNTER
Patient came in requesting appointment for depression. Scheduled soonest available, 10/28. Patient would like to be next week if possible. She is aware pcp is out of office this week. Please advise.

## 2024-10-15 NOTE — TELEPHONE ENCOUNTER
Called to see if I can get her in sooner. No answer left message. I can work the patient in on Tuesday next week. Just need her to call me back

## 2024-10-22 ENCOUNTER — OFFICE VISIT (OUTPATIENT)
Dept: FAMILY MEDICINE CLINIC | Facility: CLINIC | Age: 69
End: 2024-10-22
Payer: MEDICARE

## 2024-10-22 VITALS
BODY MASS INDEX: 32.11 KG/M2 | HEART RATE: 75 BPM | TEMPERATURE: 97.3 F | SYSTOLIC BLOOD PRESSURE: 152 MMHG | RESPIRATION RATE: 18 BRPM | OXYGEN SATURATION: 99 % | WEIGHT: 204.6 LBS | DIASTOLIC BLOOD PRESSURE: 88 MMHG | HEIGHT: 67 IN

## 2024-10-22 DIAGNOSIS — F32.A ANXIETY AND DEPRESSION: Primary | ICD-10-CM

## 2024-10-22 DIAGNOSIS — F41.9 ANXIETY AND DEPRESSION: Primary | ICD-10-CM

## 2024-10-22 PROCEDURE — 3044F HG A1C LEVEL LT 7.0%: CPT | Performed by: STUDENT IN AN ORGANIZED HEALTH CARE EDUCATION/TRAINING PROGRAM

## 2024-10-22 PROCEDURE — 1125F AMNT PAIN NOTED PAIN PRSNT: CPT | Performed by: STUDENT IN AN ORGANIZED HEALTH CARE EDUCATION/TRAINING PROGRAM

## 2024-10-22 PROCEDURE — 99213 OFFICE O/P EST LOW 20 MIN: CPT | Performed by: STUDENT IN AN ORGANIZED HEALTH CARE EDUCATION/TRAINING PROGRAM

## 2024-10-22 PROCEDURE — 3079F DIAST BP 80-89 MM HG: CPT | Performed by: STUDENT IN AN ORGANIZED HEALTH CARE EDUCATION/TRAINING PROGRAM

## 2024-10-22 PROCEDURE — 1111F DSCHRG MED/CURRENT MED MERGE: CPT | Performed by: STUDENT IN AN ORGANIZED HEALTH CARE EDUCATION/TRAINING PROGRAM

## 2024-10-22 PROCEDURE — 3077F SYST BP >= 140 MM HG: CPT | Performed by: STUDENT IN AN ORGANIZED HEALTH CARE EDUCATION/TRAINING PROGRAM

## 2024-10-22 NOTE — PROGRESS NOTES
"Subjective   Adela Dowell is a 69 y.o. female.   Chief Complaint   Patient presents with    Depression       History of Present Illness     Anxiety and depression  - Today   PHQ-9: 27   CRISTELA-7: 21  -Patient answered \"3\" on thoughts to better off dead or hurting herself in someway.  Patient elaborates that she does not have any plans or intention because she wants to be present for her grandchildren.  She has had suicides in the family before and she knows how terribly it impacts everyone  -Patient reports she had been on \"about 6\" different medications in the past  -Past medications: Lexapro (side effects and terrible side effects weaning off), Wellbutrin, Prozac  -She states all medication \"helped until it did not\" and the dose is increased or the medication was switched  - Reports PTSD from childhood (due to parents)      -Patient is seeing Dr. Delvin De Jesus (psychiatry) in [facility] who did TMS treatments with her and she initially had great response to.  She states this was offered to her because she had been on so many medications without good benefit  -She states the first round (going daily for treatments for 6 weeks at a time) worked very well for a few months.  When she came back, he took her off her last medication (had withdrawal symptoms coming off and low-dose Prozac was started to help)  -She went through another round of TMS which helped for another few months (still taking Prozac)  -Patient ran out of Prozac and she did not bother to refill it because she thought that the last round had \"fixed\" her anxiety and depression.  She notes a sharp increase of her anxiety after being out of it for a few weeks                  Preventative  - Patient received covid and flu vaccines on 9/26/24 at [facility].    The following portions of the patient's history were reviewed and updated as appropriate: allergies, current medications, past family history, past medical history, past social history, past surgical " "history, and problem list.    Patient Active Problem List   Diagnosis    Hypertension    Disorder of bursae and tendons in shoulder region    Chronic headaches    Arthritis    Anemia    H/O splenectomy    Osteopenia of lumbar spine    Depressive disorder    Class 1 obesity due to excess calories with body mass index (BMI) of 32.0 to 32.9 in adult    History of kidney stones    Osteoarthritis of left glenohumeral joint    DJD of left shoulder    Anxiety       Current Outpatient Medications on File Prior to Visit   Medication Sig Dispense Refill    aspirin-acetaminophen-caffeine (EXCEDRIN MIGRAINE) 250-250-65 MG per tablet Take 2 tablets by mouth Every 8 (Eight) Hours As Needed for Headache.      Calcium-Magnesium-Vitamin D (CALCIUM 1200+D3 PO)       Magnesium 250 MG tablet Take 1 tablet by mouth Daily.      Misc Natural Products (OSTEO BI-FLEX ADV TRIPLE ST PO) Take 2 capsules by mouth Daily.      MULTIPLE MINERALS-VITAMINS PO Take  by mouth.      Omega-3 Fatty Acids (fish oil) 1000 MG capsule capsule Take 1 capsule by mouth 2 (Two) Times a Day With Meals.      sodium chloride (NS) 0.9 % irrigation       vitamin D3 125 MCG (5000 UT) capsule capsule Take 125 mcg by mouth Daily.       No current facility-administered medications on file prior to visit.     Current outpatient and discharge medications have been reconciled for the patient.  Reviewed by: Janet Menditea DO      Allergies   Allergen Reactions    Levofloxacin Swelling and Myalgia     Joint swelling         Objective   Visit Vitals  /88 (BP Location: Right arm, Patient Position: Sitting, Cuff Size: Large Adult)   Pulse 75   Temp 97.3 °F (36.3 °C) (Temporal)   Resp 18   Ht 170.2 cm (67\")   Wt 92.8 kg (204 lb 9.6 oz)   SpO2 99%   BMI 32.04 kg/m²       Physical Exam  Constitutional:       Comments: - Patient is very tearful and cried at different times throughout the appointment   HENT:      Head: Normocephalic and atraumatic.   Eyes:      " Conjunctiva/sclera: Conjunctivae normal.   Neurological:      General: No focal deficit present.      Mental Status: She is alert and oriented to person, place, and time.   Psychiatric:         Mood and Affect: Mood normal.         Behavior: Behavior normal.           Diagnoses and all orders for this visit:    1. Anxiety and depression (Primary)  -Discussed with patient that if she noticed that coming off the Prozac caused a negative effect on her anxiety and depression that it would be certainly reasonable to restart it  -Patient reports feeling incredibly discouraged, going over to [place] multiple times being more inconvenient and draining for her.  Tried to reassure patient that we can try to at least take some the edge off with starting Prozac and we can discuss next steps on the following appointment.  Patient agreeable     -Starting FLUoxetine (PROzac) 20 MG capsule; Take 1 capsule by mouth Daily.  Dispense: 30 capsule; Refill: 1             Follow Up  -4 weeks for anxiety and depression    Expected course, medications, and adverse effects discussed as appropriate.  Call or return if worsening or persistent symptoms. Hand hygiene was performed before donning protective equipment and after removal when leaving the room.    This document is intended for medical expert use only. Reading of this document by patients and/or patient's family without participating medical staff guidance may result in misinterpretation and unintended morbidity. Any interpretation of such data is the responsibility of the patient and/or family member responsible for the patient in concert with their primary or specialist providers, not to be left for sources of online searches such as [employer], [employer] or similar queries. Relying on these approaches to knowledge may result in misinterpretation, misguided goals of care and even death should patients or family members try recommendations outside of the realm of professional medical  care.

## 2024-11-24 DIAGNOSIS — F32.A ANXIETY AND DEPRESSION: ICD-10-CM

## 2024-11-24 DIAGNOSIS — F41.9 ANXIETY AND DEPRESSION: ICD-10-CM

## 2024-11-25 ENCOUNTER — OFFICE VISIT (OUTPATIENT)
Dept: FAMILY MEDICINE CLINIC | Facility: CLINIC | Age: 69
End: 2024-11-25
Payer: MEDICARE

## 2024-11-25 VITALS
WEIGHT: 198.4 LBS | SYSTOLIC BLOOD PRESSURE: 146 MMHG | OXYGEN SATURATION: 97 % | BODY MASS INDEX: 31.14 KG/M2 | RESPIRATION RATE: 16 BRPM | DIASTOLIC BLOOD PRESSURE: 70 MMHG | HEART RATE: 65 BPM | HEIGHT: 67 IN | TEMPERATURE: 98.6 F

## 2024-11-25 DIAGNOSIS — Z23 NEED FOR PNEUMOCOCCAL 20-VALENT CONJUGATE VACCINATION: ICD-10-CM

## 2024-11-25 DIAGNOSIS — E66.09 CLASS 1 OBESITY DUE TO EXCESS CALORIES WITH SERIOUS COMORBIDITY AND BODY MASS INDEX (BMI) OF 31.0 TO 31.9 IN ADULT: ICD-10-CM

## 2024-11-25 DIAGNOSIS — F41.9 ANXIETY AND DEPRESSION: Primary | ICD-10-CM

## 2024-11-25 DIAGNOSIS — E66.811 CLASS 1 OBESITY DUE TO EXCESS CALORIES WITH SERIOUS COMORBIDITY AND BODY MASS INDEX (BMI) OF 31.0 TO 31.9 IN ADULT: ICD-10-CM

## 2024-11-25 DIAGNOSIS — F32.A ANXIETY AND DEPRESSION: Primary | ICD-10-CM

## 2024-11-25 PROCEDURE — 99213 OFFICE O/P EST LOW 20 MIN: CPT | Performed by: STUDENT IN AN ORGANIZED HEALTH CARE EDUCATION/TRAINING PROGRAM

## 2024-11-25 PROCEDURE — 1125F AMNT PAIN NOTED PAIN PRSNT: CPT | Performed by: STUDENT IN AN ORGANIZED HEALTH CARE EDUCATION/TRAINING PROGRAM

## 2024-11-25 PROCEDURE — G0009 ADMIN PNEUMOCOCCAL VACCINE: HCPCS | Performed by: STUDENT IN AN ORGANIZED HEALTH CARE EDUCATION/TRAINING PROGRAM

## 2024-11-25 PROCEDURE — 3078F DIAST BP <80 MM HG: CPT | Performed by: STUDENT IN AN ORGANIZED HEALTH CARE EDUCATION/TRAINING PROGRAM

## 2024-11-25 PROCEDURE — 1111F DSCHRG MED/CURRENT MED MERGE: CPT | Performed by: STUDENT IN AN ORGANIZED HEALTH CARE EDUCATION/TRAINING PROGRAM

## 2024-11-25 PROCEDURE — 3044F HG A1C LEVEL LT 7.0%: CPT | Performed by: STUDENT IN AN ORGANIZED HEALTH CARE EDUCATION/TRAINING PROGRAM

## 2024-11-25 PROCEDURE — 90677 PCV20 VACCINE IM: CPT | Performed by: STUDENT IN AN ORGANIZED HEALTH CARE EDUCATION/TRAINING PROGRAM

## 2024-11-25 PROCEDURE — 3077F SYST BP >= 140 MM HG: CPT | Performed by: STUDENT IN AN ORGANIZED HEALTH CARE EDUCATION/TRAINING PROGRAM

## 2024-11-25 NOTE — PROGRESS NOTES
Subjective   Adela Dowell is a 69 y.o. female.   Chief Complaint   Patient presents with    Anxiety and depression       History of Present Illness     Anxiety and depression   - Follow up from 10/22/2024: Patient had been on several medications for anxiety and depression.  Was seeing psychiatry and had done TMS with good response to the initial few treatments then they stopped working as well for her.  Started Fluoxetine 20 mg 1 capsule daily.  Pt states medication is working well at current dose (worked well for her in the distant past)  -Past medications: Lexapro (side effects and terrible side effects weaning off), Wellbutrin, Prozac - Patient currently seeing Dr. Delvin De Jesus (psychiatry) in [facility]   -Patient likes how the fluoxetine 20 mg is working for her and she would like to continue      The following portions of the patient's history were reviewed and updated as appropriate: allergies, current medications, past family history, past medical history, past social history, past surgical history, and problem list.    Patient Active Problem List   Diagnosis    Hypertension    Disorder of bursae and tendons in shoulder region    Chronic headaches    Arthritis    Anemia    H/O splenectomy    Osteopenia of lumbar spine    Depressive disorder    Class 1 obesity due to excess calories with body mass index (BMI) of 32.0 to 32.9 in adult    History of kidney stones    Osteoarthritis of left glenohumeral joint    DJD of left shoulder    Anxiety       Current Outpatient Medications on File Prior to Visit   Medication Sig Dispense Refill    aspirin-acetaminophen-caffeine (EXCEDRIN MIGRAINE) 250-250-65 MG per tablet Take 2 tablets by mouth Every 8 (Eight) Hours As Needed for Headache.      Calcium-Magnesium-Vitamin D (CALCIUM 1200+D3 PO)       Magnesium 250 MG tablet Take 1 tablet by mouth Daily.      Misc Natural Products (OSTEO BI-FLEX ADV TRIPLE ST PO) Take 2 capsules by mouth Daily.      MULTIPLE  "MINERALS-VITAMINS PO Take  by mouth.      Omega-3 Fatty Acids (fish oil) 1000 MG capsule capsule Take 1 capsule by mouth 2 (Two) Times a Day With Meals.      sodium chloride (NS) 0.9 % irrigation       vitamin D3 125 MCG (5000 UT) capsule capsule Take 125 mcg by mouth Daily.      [DISCONTINUED] FLUoxetine (PROzac) 20 MG capsule Take 1 capsule by mouth Daily. 30 capsule 1     No current facility-administered medications on file prior to visit.     Current outpatient and discharge medications have been reconciled for the patient.  Reviewed by: Janet Mendieta DO      Allergies   Allergen Reactions    Levofloxacin Swelling and Myalgia     Joint swelling         Objective   Visit Vitals  /70 (BP Location: Right arm, Patient Position: Sitting, Cuff Size: Large Adult)   Pulse 65   Temp 98.6 °F (37 °C) (Skin)   Resp 16   Ht 170.2 cm (67\")   Wt 90 kg (198 lb 6.4 oz)   SpO2 97%   BMI 31.07 kg/m²       Physical Exam  HENT:      Head: Normocephalic and atraumatic.   Eyes:      Conjunctiva/sclera: Conjunctivae normal.   Neurological:      General: No focal deficit present.      Mental Status: She is alert and oriented to person, place, and time.   Psychiatric:         Mood and Affect: Mood normal.         Behavior: Behavior normal.           Diagnoses and all orders for this visit:    1. Anxiety and depression (Primary)  - Patient doing well/controlled on current regimen.  Will continue current regimen   - refilled   FLUoxetine (PROzac) 20 MG capsule; Take 1 capsule by mouth Daily.  Dispense: 90 capsule; Refill: 1  -Discussed with patient that I am glad this seems to working well for her but we do want to keep ties with psychiatry in someway (whether she continues to see Dr. De Jesus or sees another provider) since she will have 1 treatment work well for her, then she stops responding well and requires continuous moving to keep her treated.  Would be best to have a specialist to help with treatment.  Patient agreeable.  " Patient states that she felt that she would be disappointment to her psychiatrist because these last few sets of TMS were not helpful.  Reassured her that it is not her fault and that she is not a disappointment.  It simply that her body is not reacting well to a certain treatment and we look to find a treatment that will work for her.  Patient will continue to see her current psychiatry    2. Need for pneumococcal 20-valent conjugate vaccination  -     Pneumococcal Conjugate Vaccine 20-Valent (PCV20)    3. Class 1 obesity due to excess calories with serious comorbidity and body mass index (BMI) of 31.0 to 31.9 in adult  BMI is >= 30 and <35. (Class 1 Obesity). The following options were offered after discussion;: exercise counseling/recommendations and nutrition counseling/recommendations         Follow Up  -4/21/2025 for annual wellness exam    Expected course, medications, and adverse effects discussed as appropriate.  Call or return if worsening or persistent symptoms. Hand hygiene was performed before donning protective equipment and after removal when leaving the room.    This document is intended for medical expert use only. Reading of this document by patients and/or patient's family without participating medical staff guidance may result in misinterpretation and unintended morbidity. Any interpretation of such data is the responsibility of the patient and/or family member responsible for the patient in concert with their primary or specialist providers, not to be left for sources of online searches such as [employer], [employer] or similar queries. Relying on these approaches to knowledge may result in misinterpretation, misguided goals of care and even death should patients or family members try recommendations outside of the realm of professional medical care.

## 2024-11-25 NOTE — TELEPHONE ENCOUNTER
All green check marks  Last visit 10/22/24  Follow Up  -4 weeks for anxiety and depression    Pt has appropriate maryellen today

## 2024-12-30 ENCOUNTER — OFFICE VISIT (OUTPATIENT)
Dept: FAMILY MEDICINE CLINIC | Facility: CLINIC | Age: 69
End: 2024-12-30
Payer: MEDICARE

## 2024-12-30 VITALS
WEIGHT: 192.2 LBS | BODY MASS INDEX: 30.17 KG/M2 | HEART RATE: 102 BPM | HEIGHT: 67 IN | TEMPERATURE: 98.4 F | SYSTOLIC BLOOD PRESSURE: 152 MMHG | RESPIRATION RATE: 18 BRPM | DIASTOLIC BLOOD PRESSURE: 92 MMHG | OXYGEN SATURATION: 96 %

## 2024-12-30 DIAGNOSIS — E66.09 CLASS 1 OBESITY DUE TO EXCESS CALORIES WITHOUT SERIOUS COMORBIDITY WITH BODY MASS INDEX (BMI) OF 32.0 TO 32.9 IN ADULT: ICD-10-CM

## 2024-12-30 DIAGNOSIS — J06.9 UPPER RESPIRATORY TRACT INFECTION, UNSPECIFIED TYPE: ICD-10-CM

## 2024-12-30 DIAGNOSIS — Z90.81 H/O SPLENECTOMY: ICD-10-CM

## 2024-12-30 DIAGNOSIS — J01.00 SUBACUTE MAXILLARY SINUSITIS: Primary | ICD-10-CM

## 2024-12-30 DIAGNOSIS — I10 PRIMARY HYPERTENSION: ICD-10-CM

## 2024-12-30 DIAGNOSIS — E66.811 CLASS 1 OBESITY DUE TO EXCESS CALORIES WITHOUT SERIOUS COMORBIDITY WITH BODY MASS INDEX (BMI) OF 32.0 TO 32.9 IN ADULT: ICD-10-CM

## 2024-12-30 LAB
EXPIRATION DATE: NORMAL
FLUAV AG UPPER RESP QL IA.RAPID: NOT DETECTED
FLUBV AG UPPER RESP QL IA.RAPID: NOT DETECTED
INTERNAL CONTROL: NORMAL
Lab: NORMAL
SARS-COV-2 AG UPPER RESP QL IA.RAPID: NOT DETECTED

## 2024-12-30 PROCEDURE — 99213 OFFICE O/P EST LOW 20 MIN: CPT | Performed by: FAMILY MEDICINE

## 2024-12-30 PROCEDURE — 87428 SARSCOV & INF VIR A&B AG IA: CPT | Performed by: FAMILY MEDICINE

## 2024-12-30 PROCEDURE — 1160F RVW MEDS BY RX/DR IN RCRD: CPT | Performed by: FAMILY MEDICINE

## 2024-12-30 PROCEDURE — 1126F AMNT PAIN NOTED NONE PRSNT: CPT | Performed by: FAMILY MEDICINE

## 2024-12-30 PROCEDURE — 1159F MED LIST DOCD IN RCRD: CPT | Performed by: FAMILY MEDICINE

## 2024-12-30 PROCEDURE — 3080F DIAST BP >= 90 MM HG: CPT | Performed by: FAMILY MEDICINE

## 2024-12-30 PROCEDURE — 3077F SYST BP >= 140 MM HG: CPT | Performed by: FAMILY MEDICINE

## 2024-12-30 RX ORDER — DOXYCYCLINE 100 MG/1
100 CAPSULE ORAL 2 TIMES DAILY
Qty: 20 CAPSULE | Refills: 1 | Status: SHIPPED | OUTPATIENT
Start: 2024-12-30

## 2024-12-30 NOTE — PROGRESS NOTES
Chief Complaint  URI and Hypertension    Subjective     CC  Problem List  Visit Diagnosis   Encounters  Notes  Medications  Labs  Result Review Imaging  Media    Adela Dowell presents to [facility] for   URI   This is a new problem. The current episode started in the past 7 days. The problem has been unchanged. Associated symptoms include congestion, coughing, diarrhea, ear pain, joint pain, a plugged ear sensation, rhinorrhea, sinus pain, sneezing and a sore throat. Pertinent negatives include no chest pain, dysuria, joint swelling, nausea, rash, vomiting or wheezing. Headaches: mild intermittent..Associated symptoms comments: Shortness of breath. She has tried decongestant (she was treated for sinusitis, 12/09/24 with Amox and steroids,) for the symptoms. The treatment provided mild relief.   Hypertension  This is a chronic problem. The current episode started more than 1 year ago. The problem has been stable since onset. The problem is controlled. Pertinent negatives include no anxiety, chest pain, malaise/fatigue, orthopnea, palpitations, peripheral edema, shortness of breath or sweats. Headaches: mild intermittent..There are no associated agents to hypertension. Risk factors for coronary artery disease include obesity and post-menopausal state. Past treatments include nothing. Current antihypertension treatment includes nothing. The current treatment provides mild improvement. There are no compliance problems.  There is no history of angina, kidney disease, CAD/MI, CVA or heart failure.       Review of Systems   Constitutional:  Negative for fever, malaise/fatigue and unexpected weight loss.   HENT:  Positive for congestion, ear pain, rhinorrhea, sneezing and sore throat.    Eyes:  Negative for visual disturbance.   Respiratory:  Positive for cough. Negative for shortness of breath and wheezing.    Cardiovascular:  Negative for chest pain, palpitations and orthopnea.  "  Gastrointestinal:  Positive for diarrhea. Negative for nausea and vomiting.   Endocrine: Negative for cold intolerance, heat intolerance, polydipsia and polyuria.   Genitourinary:  Negative for dysuria.   Musculoskeletal:  Positive for joint pain.   Skin:  Negative for rash.   Hematological:  Negative for adenopathy. Does not bruise/bleed easily.        Objective   Vital Signs:   /92 (BP Location: Right arm, Patient Position: Sitting, Cuff Size: Adult)   Pulse 102   Temp 98.4 °F (36.9 °C) (Temporal)   Resp 18   Ht 170.2 cm (67\")   Wt 87.2 kg (192 lb 3.2 oz)   SpO2 96% Comment: room air  BMI 30.10 kg/m²     Physical Exam   Result Review :Labs  Result Review  Imaging  Med Tab  Media                 Assessment and Plan CC Problem List  Visit Diagnosis  ROS  Review (Popup)  Health Maintenance  Quality  BestPractice  Medications  SmartSets  SnapShot Encounters  Media  Problem List Items Addressed This Visit          Unprioritized    Hypertension    Current Assessment & Plan     Marginal control, presently off meds, watch salt, wt loss exercise   F/u in 1 mos with log         H/O splenectomy    Overview     For trauma, age 28. aware         Class 1 obesity due to excess calories with body mass index (BMI) of 32.0 to 32.9 in adult    Current Assessment & Plan     Patient's (Body mass index is 30.1 kg/m².) indicates that they are obese (BMI >30) with health conditions that include hypertension . Weight is improving with lifestyle modifications. BMI  is above average; BMI management plan is completed. We discussed portion control and increasing exercise.           Other Visit Diagnoses       Subacute maxillary sinusitis    -  Primary    abx fludis saline flushes and follow up if no improvemnt over 48 hrs resolution over 1 week.    Relevant Medications    doxycycline (MONODOX) 100 MG capsule    Upper respiratory tract infection, unspecified type        neg flu neg covid    Relevant Medications "    doxycycline (MONODOX) 100 MG capsule    Other Relevant Orders    POCT SARS-CoV-2 + Flu Antigen BRITTANI (Completed)            Follow Up Instructions Charge Capture  Follow-up Communications  Return in about 4 weeks (around 1/27/2025), or if symptoms worsen or fail to improve.  Patient was given instructions and counseling regarding her condition or for health maintenance advice. Please see specific information pulled into the AVS if appropriate.

## 2025-01-05 DIAGNOSIS — F41.9 ANXIETY AND DEPRESSION: ICD-10-CM

## 2025-01-05 DIAGNOSIS — F32.A ANXIETY AND DEPRESSION: ICD-10-CM

## 2025-01-08 NOTE — ASSESSMENT & PLAN NOTE
Patient's (Body mass index is 30.1 kg/m².) indicates that they are obese (BMI >30) with health conditions that include hypertension . Weight is improving with lifestyle modifications. BMI  is above average; BMI management plan is completed. We discussed portion control and increasing exercise.

## 2025-02-03 ENCOUNTER — OFFICE VISIT (OUTPATIENT)
Dept: FAMILY MEDICINE CLINIC | Facility: CLINIC | Age: 70
End: 2025-02-03
Payer: MEDICARE

## 2025-02-03 DIAGNOSIS — I10 PRIMARY HYPERTENSION: ICD-10-CM

## 2025-02-03 DIAGNOSIS — J06.9 UPPER RESPIRATORY TRACT INFECTION, UNSPECIFIED TYPE: ICD-10-CM

## 2025-02-03 DIAGNOSIS — J32.0 CHRONIC MAXILLARY SINUSITIS: Primary | ICD-10-CM

## 2025-02-03 PROCEDURE — 1126F AMNT PAIN NOTED NONE PRSNT: CPT | Performed by: FAMILY MEDICINE

## 2025-02-03 PROCEDURE — 99214 OFFICE O/P EST MOD 30 MIN: CPT | Performed by: FAMILY MEDICINE

## 2025-02-03 PROCEDURE — 1160F RVW MEDS BY RX/DR IN RCRD: CPT | Performed by: FAMILY MEDICINE

## 2025-02-03 PROCEDURE — 3080F DIAST BP >= 90 MM HG: CPT | Performed by: FAMILY MEDICINE

## 2025-02-03 PROCEDURE — 1159F MED LIST DOCD IN RCRD: CPT | Performed by: FAMILY MEDICINE

## 2025-02-03 PROCEDURE — 3077F SYST BP >= 140 MM HG: CPT | Performed by: FAMILY MEDICINE

## 2025-02-03 PROCEDURE — 87428 SARSCOV & INF VIR A&B AG IA: CPT | Performed by: FAMILY MEDICINE

## 2025-02-03 RX ORDER — DOXYCYCLINE 100 MG/1
100 CAPSULE ORAL 2 TIMES DAILY
Qty: 28 CAPSULE | Refills: 1 | Status: SHIPPED | OUTPATIENT
Start: 2025-02-03

## 2025-02-03 RX ORDER — LISINOPRIL 20 MG/1
20 TABLET ORAL DAILY
Qty: 90 TABLET | Refills: 3 | Status: SHIPPED | OUTPATIENT
Start: 2025-02-03

## 2025-02-03 NOTE — PROGRESS NOTES
Chief Complaint  URI and Hypertension    Subjective     CC  Problem List  Visit Diagnosis   Encounters  Notes  Medications  Labs  Result Review Imaging  Media    Adela Dowell presents to [facility] for   URI   This is a recurrent problem. The current episode started 1 to 4 weeks ago. The problem has been waxing and waning. There has been no fever. Associated symptoms include congestion, coughing, ear pain, headaches, a plugged ear sensation, rhinorrhea, sinus pain and a sore throat. Pertinent negatives include no abdominal pain, chest pain, diarrhea, dysuria, joint pain, joint swelling, nausea, neck pain, sneezing, vomiting or wheezing. Treatments tried: Recently finished doxycycline.   Hypertension  This is a chronic problem. The current episode started more than 1 year ago. The problem has been stable since onset. The problem is uncontrolled. Associated symptoms include headaches. Pertinent negatives include no anxiety, blurred vision, chest pain, neck pain, orthopnea or shortness of breath. There are no associated agents to hypertension. Risk factors for coronary artery disease include post-menopausal state. Past treatments include nothing. Current antihypertension treatment includes nothing. The current treatment provides mild improvement.     Review of Systems   Constitutional:  Negative for unexpected weight loss.   HENT:  Positive for congestion, ear pain, rhinorrhea and sore throat. Negative for sneezing.    Eyes:  Negative for blurred vision.   Respiratory:  Positive for cough. Negative for shortness of breath and wheezing.    Cardiovascular:  Negative for chest pain and orthopnea.   Gastrointestinal:  Negative for abdominal pain, diarrhea, nausea and vomiting.   Genitourinary:  Negative for dysuria.   Musculoskeletal:  Negative for joint pain and neck pain.   Neurological:  Negative for weakness, numbness and headache.        Objective   Vital Signs:   /100   " Pulse 86   Temp 97.8 °F (36.6 °C) (Temporal)   Resp 18   Ht 170.2 cm (67.01\")   Wt 88.5 kg (195 lb)   BMI 30.53 kg/m²     Physical Exam  Constitutional:       General: She is not in acute distress.  HENT:      Right Ear: Tympanic membrane normal.      Left Ear: Tympanic membrane normal.      Mouth/Throat:      Pharynx: Oropharynx is clear. No oropharyngeal exudate or posterior oropharyngeal erythema (moderate post nasal secretions.).   Neck:      Thyroid: No thyromegaly.      Vascular: No JVD.   Cardiovascular:      Rate and Rhythm: Normal rate and regular rhythm.      Heart sounds: No murmur heard.  Pulmonary:      Effort: Pulmonary effort is normal.      Breath sounds: Normal breath sounds. No wheezing.   Musculoskeletal:      Cervical back: Neck supple.      Right lower leg: No edema.      Left lower leg: No edema.   Lymphadenopathy:      Cervical: Cervical adenopathy present.   Neurological:      Mental Status: She is alert.        Result Review :Labs  Result Review  Imaging  Med Tab  Media                 Assessment and Plan CC Problem List  Visit Diagnosis  ROS  Review (Popup)  Health Maintenance  Quality  BestPractice  Medications  SmartSets  SnapShot Encounters  Media  Problem List Items Addressed This Visit          Unprioritized    Hypertension    Overview     Moderate elevation begin meds.          Current Assessment & Plan     Hypertension is uncontrolled  Medication changes per orders.  Blood pressure will be reassessedin 4 weeks.  Risk benefit of Rx discussed.   Low salt wt reducing diet discussed, lots of exercise encouraged.          Relevant Medications    lisinopril (PRINIVIL,ZESTRIL) 20 MG tablet     Other Visit Diagnoses       Chronic maxillary sinusitis    -  Primary    resume abx given she was improved while on Rx, ENT referral maybe indicated if no improve after 2 more wks of Rx    Relevant Medications    doxycycline (MONODOX) 100 MG capsule    Upper respiratory tract " infection, unspecified type        neg flu neg covid.    Relevant Medications    doxycycline (MONODOX) 100 MG capsule    Other Relevant Orders    POCT SARS-CoV-2 Antigen BRITTANI + Flu (Completed)            Follow Up Instructions Charge Capture  Follow-up Communications  Return in about 4 weeks (around 3/3/2025), or if symptoms worsen or fail to improve.  Patient was given instructions and counseling regarding her condition or for health maintenance advice. Please see specific information pulled into the AVS if appropriate.

## 2025-02-09 VITALS
BODY MASS INDEX: 30.61 KG/M2 | SYSTOLIC BLOOD PRESSURE: 140 MMHG | TEMPERATURE: 97.8 F | DIASTOLIC BLOOD PRESSURE: 100 MMHG | HEART RATE: 86 BPM | WEIGHT: 195 LBS | HEIGHT: 67 IN | RESPIRATION RATE: 18 BRPM

## 2025-02-10 NOTE — ASSESSMENT & PLAN NOTE
Hypertension is uncontrolled  Medication changes per orders.  Blood pressure will be reassessedin 4 weeks.  Risk benefit of Rx discussed.   Low salt wt reducing diet discussed, lots of exercise encouraged.

## 2025-02-25 NOTE — PROGRESS NOTES
Subjective   Adela Dowell is a 69 y.o. female.   Chief Complaint   Patient presents with    Hypertension       History of Present Illness     Hypertension  -Follow up from 02/03/2025:  Pt evaluated by Dr. Kim Arita: Patient's blood pressures had been elevated (152/88, 146/70, 152/92, 140/100).  Lisinopril 20 mg was started  -Patient does check blood pressure at home.   -Patient reports headaches.  -Patient states medications is not working well.   -Last two blood pressures:   152/92  12/30/2024   140/100 02/03/2025  -Blood pressure today using her portable blood pressure machine: 135/86  -Manual blood pressure:  112/64  -Pt brought in documentation of recent blood pressure readings for review, blood pressures on average were about 150 systolic  -Told patient that her blood pressure cuff seems to be inaccurate.  Discussed with her not to focus on the readings from her machine, manual readings are the most accurate and her blood pressure seems to be in good range    Vitamin D  - priopr pcp had her start taking vitamin D but last one came back high (103). Pt thought excess was causing high bp so stopped taking supplement 10 days ago  - was taking 75mcg, about 3000 iu     Headaches/insomnia  -Started a few months ago  -Started waking up with bad headaches  -Has been having difficulty sleep            Prediabetes  Patient has been erroneously marked as diabetic. Based on the available clinical information, she does not have diabetes and should therefore be excluded from diabetic health maintenance and quality measures for the remainder of the reporting period.    The following portions of the patient's history were reviewed and updated as appropriate: allergies, current medications, past family history, past medical history, past social history, past surgical history, and problem list.    Patient Active Problem List   Diagnosis    Hypertension    Disorder of bursae and tendons in shoulder region    Chronic headaches  "   Arthritis    Anemia    H/O splenectomy    Osteopenia of lumbar spine    Depressive disorder    Class 1 obesity due to excess calories with body mass index (BMI) of 32.0 to 32.9 in adult    History of kidney stones    Osteoarthritis of left glenohumeral joint    DJD of left shoulder    Anxiety    Prediabetes       Current Outpatient Medications on File Prior to Visit   Medication Sig Dispense Refill    aspirin-acetaminophen-caffeine (EXCEDRIN MIGRAINE) 250-250-65 MG per tablet Take 2 tablets by mouth Every 8 (Eight) Hours As Needed for Headache.      Calcium-Magnesium-Vitamin D (CALCIUM 1200+D3 PO)       FLUoxetine (PROzac) 20 MG capsule Take 1 capsule by mouth Daily. 90 capsule 0    lisinopril (PRINIVIL,ZESTRIL) 20 MG tablet Take 1 tablet by mouth Daily. 90 tablet 3    Magnesium 250 MG tablet Take 1 tablet by mouth Daily.      Misc Natural Products (OSTEO BI-FLEX ADV TRIPLE ST PO) Take 2 capsules by mouth Daily.      MULTIPLE MINERALS-VITAMINS PO Take  by mouth.      Omega-3 Fatty Acids (fish oil) 1000 MG capsule capsule Take 1 capsule by mouth 2 (Two) Times a Day With Meals.      sodium chloride (NS) 0.9 % irrigation       vitamin D3 125 MCG (5000 UT) capsule capsule Take 125 mcg by mouth Daily.       No current facility-administered medications on file prior to visit.     Current outpatient and discharge medications have been reconciled for the patient.  Reviewed by: Janet Mendieta DO      Allergies   Allergen Reactions    Levofloxacin Swelling and Myalgia     Joint swelling         Objective   Visit Vitals  /64 (BP Location: Left arm, Patient Position: Sitting, Cuff Size: Adult)   Pulse 75   Temp 98.9 °F (37.2 °C) (Skin)   Resp 18   Ht 170.2 cm (67.01\")   Wt 89.3 kg (196 lb 12.8 oz)   SpO2 97%   BMI 30.81 kg/m²       Physical Exam  HENT:      Head: Normocephalic and atraumatic.   Eyes:      Conjunctiva/sclera: Conjunctivae normal.   Neurological:      General: No focal deficit present.      Mental " Status: She is alert and oriented to person, place, and time.   Psychiatric:         Mood and Affect: Mood normal.         Behavior: Behavior normal.           Diagnoses and all orders for this visit:    1. Primary hypertension (Primary)  - Patient doing well/controlled on current regimen.  Will continue current regimen  -We will keep up amitriptyline 10 mg, lisinopril 20 mg      2. Vitamin D deficiency  - Recommended going to 1000 iu instead (should be about 25 mcg)    3. Other insomnia/ Frequent headaches  -    started amitriptyline (ELAVIL) 10 MG tablet; Take 1 tablet by mouth Every Night.  Dispense: 30 tablet; Refill: 2      5. Class 1 obesity due to excess calories with serious comorbidity and body mass index (BMI) of 30.0 to 30.9 in adult  BMI is >= 30 and <35. (Class 1 Obesity). The following options were offered after discussion;: exercise counseling/recommendations and nutrition counseling/recommendations             Follow Up  - 4/21/25 for annual wellness exam    Expected course, medications, and adverse effects discussed as appropriate.  Call or return if worsening or persistent symptoms. Hand hygiene was performed before donning protective equipment and after removal when leaving the room.    This document is intended for medical expert use only. Reading of this document by patients and/or patient's family without participating medical staff guidance may result in misinterpretation and unintended morbidity. Any interpretation of such data is the responsibility of the patient and/or family member responsible for the patient in concert with their primary or specialist providers, not to be left for sources of online searches such as [employer], [employer] or similar queries. Relying on these approaches to knowledge may result in misinterpretation, misguided goals of care and even death should patients or family members try recommendations outside of the realm of professional medical care.

## 2025-02-27 ENCOUNTER — OFFICE VISIT (OUTPATIENT)
Dept: FAMILY MEDICINE CLINIC | Facility: CLINIC | Age: 70
End: 2025-02-27
Payer: MEDICARE

## 2025-02-27 VITALS
RESPIRATION RATE: 18 BRPM | BODY MASS INDEX: 30.89 KG/M2 | OXYGEN SATURATION: 97 % | WEIGHT: 196.8 LBS | SYSTOLIC BLOOD PRESSURE: 112 MMHG | TEMPERATURE: 98.9 F | HEIGHT: 67 IN | HEART RATE: 75 BPM | DIASTOLIC BLOOD PRESSURE: 64 MMHG

## 2025-02-27 DIAGNOSIS — G47.09 OTHER INSOMNIA: ICD-10-CM

## 2025-02-27 DIAGNOSIS — E55.9 VITAMIN D DEFICIENCY: ICD-10-CM

## 2025-02-27 DIAGNOSIS — E66.811 CLASS 1 OBESITY DUE TO EXCESS CALORIES WITH SERIOUS COMORBIDITY AND BODY MASS INDEX (BMI) OF 30.0 TO 30.9 IN ADULT: ICD-10-CM

## 2025-02-27 DIAGNOSIS — R51.9 FREQUENT HEADACHES: ICD-10-CM

## 2025-02-27 DIAGNOSIS — I10 PRIMARY HYPERTENSION: Primary | ICD-10-CM

## 2025-02-27 DIAGNOSIS — E66.09 CLASS 1 OBESITY DUE TO EXCESS CALORIES WITH SERIOUS COMORBIDITY AND BODY MASS INDEX (BMI) OF 30.0 TO 30.9 IN ADULT: ICD-10-CM

## 2025-02-27 PROBLEM — R73.03 PREDIABETES: Status: ACTIVE | Noted: 2025-02-27

## 2025-02-27 PROCEDURE — 99214 OFFICE O/P EST MOD 30 MIN: CPT | Performed by: STUDENT IN AN ORGANIZED HEALTH CARE EDUCATION/TRAINING PROGRAM

## 2025-02-27 RX ORDER — AMITRIPTYLINE HYDROCHLORIDE 10 MG/1
10 TABLET ORAL NIGHTLY
Qty: 30 TABLET | Refills: 2 | Status: SHIPPED | OUTPATIENT
Start: 2025-02-27

## 2025-04-08 DIAGNOSIS — F32.A ANXIETY AND DEPRESSION: ICD-10-CM

## 2025-04-08 DIAGNOSIS — F41.9 ANXIETY AND DEPRESSION: ICD-10-CM

## 2025-04-21 ENCOUNTER — OFFICE VISIT (OUTPATIENT)
Dept: FAMILY MEDICINE CLINIC | Facility: CLINIC | Age: 70
End: 2025-04-21
Payer: MEDICARE

## 2025-04-21 VITALS
SYSTOLIC BLOOD PRESSURE: 140 MMHG | WEIGHT: 198.6 LBS | RESPIRATION RATE: 16 BRPM | HEIGHT: 67 IN | OXYGEN SATURATION: 98 % | DIASTOLIC BLOOD PRESSURE: 70 MMHG | HEART RATE: 94 BPM | BODY MASS INDEX: 31.17 KG/M2 | TEMPERATURE: 98.1 F

## 2025-04-21 DIAGNOSIS — M85.88 OSTEOPENIA OF LUMBAR SPINE: ICD-10-CM

## 2025-04-21 DIAGNOSIS — Z13.29 THYROID DISORDER SCREEN: ICD-10-CM

## 2025-04-21 DIAGNOSIS — E66.811 CLASS 1 OBESITY DUE TO EXCESS CALORIES WITH SERIOUS COMORBIDITY AND BODY MASS INDEX (BMI) OF 31.0 TO 31.9 IN ADULT: ICD-10-CM

## 2025-04-21 DIAGNOSIS — Z00.00 MEDICARE ANNUAL WELLNESS VISIT, SUBSEQUENT: Primary | ICD-10-CM

## 2025-04-21 DIAGNOSIS — G47.09 OTHER INSOMNIA: ICD-10-CM

## 2025-04-21 DIAGNOSIS — F41.9 ANXIETY AND DEPRESSION: ICD-10-CM

## 2025-04-21 DIAGNOSIS — T88.7XXA DRUG SIDE EFFECTS: ICD-10-CM

## 2025-04-21 DIAGNOSIS — R51.9 FREQUENT HEADACHES: ICD-10-CM

## 2025-04-21 DIAGNOSIS — E78.2 MIXED HYPERLIPIDEMIA: ICD-10-CM

## 2025-04-21 DIAGNOSIS — R73.03 PREDIABETES: ICD-10-CM

## 2025-04-21 DIAGNOSIS — E66.09 CLASS 1 OBESITY DUE TO EXCESS CALORIES WITH SERIOUS COMORBIDITY AND BODY MASS INDEX (BMI) OF 31.0 TO 31.9 IN ADULT: ICD-10-CM

## 2025-04-21 DIAGNOSIS — F32.A ANXIETY AND DEPRESSION: ICD-10-CM

## 2025-04-21 RX ORDER — FLUOXETINE HYDROCHLORIDE 40 MG/1
40 CAPSULE ORAL DAILY
Qty: 30 CAPSULE | Refills: 1 | Status: SHIPPED | OUTPATIENT
Start: 2025-04-21

## 2025-04-21 NOTE — PATIENT INSTRUCTIONS
Standardized Exercise Recommendations  - Work up to 150 minutes of aerobic, moderate intensity (you can talk but you can't sing) or 75 minutes of vigorous activity of dedicated exercise a week  - 2 days a week, include resistance/weight training    Light intensity   - Ex: casual walking, light housework, stretching  Moderate Intensity   - brisk walking, water aerobics, ballroom dancing   - breathing will be a little harder with a faster heartbeat  Vigorous Intensity   - jogging/running, aerobic dancing    What are the benefits of movement?  Moving your body has many benefits. It can:  ?Burn calories, which helps people control their weight  ?Help control blood sugar levels in people with diabetes  ?Lower blood pressure, especially in people with high blood pressure  ?Lower stress and help with depression and anxiety  ?Keep bones strong, so they don't get thin and break easily  ?Lower the chance of dying from heart disease  Adding even small amounts of physical activity to your daily routine can improve your health.    What are the main types of exercise?  There are 3 main types of exercise. They are:  ?Aerobic exercise - Aerobic exercise raises a person's heart rate. Examples of aerobic exercise are walking, running, dancing, riding a bike, or swimming.  ?Resistance training - Resistance training helps make your muscles stronger. People can do this type of exercise using weights, exercise bands, or weight machines. You can also do this type of exercise using your own body weight, as with push-ups, or by lifting items in your home, like jugs of water.   ?Stretching - Stretching exercises help your muscles and joints move more easily.  It's important to have all 3 types of exercise in your exercise program. That way, your body, muscles, and joints can be as healthy as possible.    Should I talk to my doctor or nurse before I start exercising?  If you have not exercised before or have not exercised in a long time,  talk with your doctor or nurse before you start a very active exercise program.  If you have heart disease or risk factors for heart disease (like high blood pressure or diabetes), your doctor or nurse might recommend that you have an exercise test before starting an exercise program.  When you start an exercise program, start slowly. For example, do the exercise at a slow pace or for a few minutes only. Over time, you can exercise faster and for longer periods of time.  What should I do when I exercise? -- Each time you exercise, you should:  ?Warm up - Warming up can help keep you from hurting your muscles when you exercise. To warm up, do a light aerobic exercise (such as walking slowly) or stretch for 5 to 10 minutes.  ?Work out - You should try to get a mix of aerobic exercise, resistance training, and stretching. During an aerobic workout, you can walk fast, swim, run, or use an exercise machine, for example. Other activities, like dancing or playing tennis, are also forms of aerobic exercise. You should also take time to stretch all of your joints, including your neck, shoulders, back, hips, and knees. At least 2 times a week, you can do resistance training exercises as part of your workout.  ?Cool down - Cooling down helps keep you from feeling dizzy after you exercise and helps prevent muscle cramps. To cool down, you can stretch or do a light aerobic exercise for 5 minutes.  Some people go to a gym or do group exercise classes. But you can exercise even without these things. Some exercises can be done even in a small space. You can also try online videos or smartphone apps to get ideas for different types of exercise.     How often should I exercise?   Doctors recommend that people exercise at least 30 minutes a day, on 5 or more days of the week.  If you can't exercise for 30 minutes straight, try to exercise for 10 minutes at a time, 3 or 4 times a day. Even exercising for shorter amounts of time is good  for you, especially if it means spending less time sitting.   When should I call my doctor or nurse? -- If you have any of the following symptoms when you exercise, stop exercising and call your doctor or nurse right away:  ?Pain or pressure in your chest, arms, throat, jaw, or back  ?Nausea or vomiting  ?Feeling like your heart is fluttering or racing very fast  ?Feeling dizzy or faint    What if I don't have time to exercise?   Many people have very busy lives and might not think that they have time to exercise. But it's important to try to find time to exercise, even if you are tired or work a lot. Exercise can increase your energy level, which can make you feel better and might even help you get more work done.  Even if it's hard to set aside a lot of time to exercise, you can still improve your health by moving your body more. There are many ways that you can be more active. For example, you can:  ?Take the stairs instead of the elevator  ?Park in a parking space that is farther away from the door  ?Take a longer route when you walk from one place to another  Spending a lot of time sitting still - for example, watching television or working on the computer - can be bad for your health. Try to get up and move around whenever you can. Even small amounts of movement, like taking short walks, doing household chores, or gardening, can help improve your health. Finding activities you enjoy, or doing them with other people, can help you add more movement into your daily life.    What else should I do when I exercise?   To exercise safely and avoid problems, it's important to:  ?Drink fluids during and after exercising (but avoid drinks with a lot of caffeine or sugar)  ?Avoid exercising outside if it is too hot or cold  ?Wear layers of clothes, so that you can take them off if you get too hot  ?Wear shoes that fit well and support your feet  ?Be aware of your surroundings if you exercise outside

## 2025-04-21 NOTE — PROGRESS NOTES
Subjective   The ABCs of the Annual Wellness Visit  Medicare Wellness Visit      Adela Dowell is a 70 y.o. patient who presents for a Medicare Wellness Visit.    The following portions of the patient's history were reviewed and   updated as appropriate: allergies, current medications, past family history, past medical history, past social history, past surgical history, and problem list.    Compared to one year ago, the patient's physical   health is better.  Compared to one year ago, the patient's mental   health is the same.    Recent Hospitalizations:  She was not admitted to the hospital during the last year.     Current Medical Providers:  Patient Care Team:  Janet Mendieta DO as PCP - General (Family Medicine)  Milton Castro MD as Surgeon (Orthopedic Surgery)  Heath Ryan MD as Consulting Physician (Urology)  Aureliano Roberts MD as Consulting Physician (Orthopedic Surgery)  Mohinder Louis MD as Consulting Physician (Pulmonary Disease)  Delvin De Jesus MD as Consulting Physician (Psychiatry)    Outpatient Medications Prior to Visit   Medication Sig Dispense Refill    aspirin-acetaminophen-caffeine (EXCEDRIN MIGRAINE) 250-250-65 MG per tablet Take 2 tablets by mouth Every 8 (Eight) Hours As Needed for Headache.      Calcium-Magnesium-Vitamin D (CALCIUM 1200+D3 PO)       lisinopril (PRINIVIL,ZESTRIL) 20 MG tablet Take 1 tablet by mouth Daily. 90 tablet 3    Magnesium 250 MG tablet Take 1 tablet by mouth Daily.      Misc Natural Products (OSTEO BI-FLEX ADV TRIPLE ST PO) Take 2 capsules by mouth Daily.      MULTIPLE MINERALS-VITAMINS PO Take  by mouth.      Omega-3 Fatty Acids (fish oil) 1000 MG capsule capsule Take 1 capsule by mouth 2 (Two) Times a Day With Meals.      sodium chloride (NS) 0.9 % irrigation       vitamin D3 125 MCG (5000 UT) capsule capsule Take 125 mcg by mouth Daily.      FLUoxetine (PROzac) 20 MG capsule TAKE 1 CAPSULE BY MOUTH EVERY DAY 30 capsule 0     "amitriptyline (ELAVIL) 10 MG tablet Take 1 tablet by mouth Every Night. 30 tablet 2     No facility-administered medications prior to visit.     No opioid medication identified on active medication list. I have reviewed chart for other potential  high risk medication/s and harmful drug interactions in the elderly.      Aspirin is not on active medication list.  Aspirin use is not indicated based on review of current medical condition/s. Risk of harm outweighs potential benefits.  .    Patient Active Problem List   Diagnosis    Hypertension    Disorder of bursae and tendons in shoulder region    Chronic headaches    Arthritis    Anemia    H/O splenectomy    Osteopenia of lumbar spine    Depressive disorder    Class 1 obesity due to excess calories with body mass index (BMI) of 32.0 to 32.9 in adult    History of kidney stones    Osteoarthritis of left glenohumeral joint    DJD of left shoulder    Anxiety    Prediabetes    Mixed hyperlipidemia     Advance Care Planning Advance Directive is not on file.  ACP discussion was held with the patient during this visit. Patient does not have an advance directive, information provided.          Spent 16 minutes in discussion with patient on ACP      Objective   Vitals:    25 1110 25 1235   BP: 150/82 140/70   BP Location: Right arm Right arm   Patient Position: Sitting Sitting   Cuff Size: Adult Adult   Pulse: 94    Resp: 16    Temp: 98.1 °F (36.7 °C)    TempSrc: Skin    SpO2: 98%    Weight: 90.1 kg (198 lb 9.6 oz)    Height: 170.2 cm (67.01\")        Estimated body mass index is 31.1 kg/m² as calculated from the following:    Height as of this encounter: 170.2 cm (67.01\").    Weight as of this encounter: 90.1 kg (198 lb 9.6 oz).                Does the patient have evidence of cognitive impairment? No  - ACE III minico/30                                                                                        Health  Risk Assessment    Smoking Status:  Social " History     Tobacco Use   Smoking Status Never    Passive exposure: Past   Smokeless Tobacco Never     Alcohol Consumption:  Social History     Substance and Sexual Activity   Alcohol Use Never       Fall Risk Screen  STEADI Fall Risk Assessment was completed, and patient is at LOW risk for falls.Assessment completed on:2025    Depression Screening   Little interest or pleasure in doing things? Nearly every day   Feeling down, depressed, or hopeless? Nearly every day   PHQ-2 Total Score 6   Trouble falling or staying asleep, or sleeping too much? Nearly every day   Feeling tired or having little energy? Nearly every day   Poor appetite or overeating? Nearly every day (over eating)   Feeling bad about yourself - or that you are a failure or have let yourself or your family down? Nearly every day   Trouble concentrating on things, such as reading the newspaper or watching television? Nearly every day   Moving or speaking so slowly that other people could have noticed? Or the opposite - being so fidgety or restless that you have been moving around a lot more than usual? Not at all     Thoughts that you would be better off dead, or of hurting yourself in some way? More than half the days   PHQ-9 Total Score 23   If you checked off any problems, how difficult have these problems made it for you to do your work, take care of things at home, or get along with other people? Very difficult        Health Habits and Functional and Cognitive Screenin/20/2025     9:55 PM   Functional & Cognitive Status   Do you have difficulty preparing food and eating? No   Do you have difficulty bathing yourself, getting dressed or grooming yourself? No   Do you have difficulty using the toilet? No   Do you have difficulty moving around from place to place? No   Do you have trouble with steps or getting out of a bed or a chair? No   Current Diet Limited Junk Food   Dental Exam Up to date   Eye Exam Up to date   Exercise (times  per week) 5 times per week   Current Exercises Include Gardening;House Cleaning;Yard Work   Do you need help using the phone?  No   Are you deaf or do you have serious difficulty hearing?  No   Do you need help to go to places out of walking distance? No   Do you need help shopping? No   Do you need help preparing meals?  No   Do you need help with housework?  No   Do you need help with laundry? No   Do you need help taking your medications? No   Do you need help managing money? No   Do you ever drive or ride in a car without wearing a seat belt? No   Have you felt unusual stress, anger or loneliness in the last month? Yes   Who do you live with? Spouse   If you need help, do you have trouble finding someone available to you? No   Have you been bothered in the last four weeks by sexual problems? No   Do you have difficulty concentrating, remembering or making decisions? No           Age-appropriate Screening Schedule:  Refer to the list below for future screening recommendations based on patient's age, sex and/or medical conditions. Orders for these recommended tests are listed in the plan section. The patient has been provided with a written plan.    Health Maintenance List  Health Maintenance   Topic Date Due    COVID-19 Vaccine (8 - 2024-25 season) 03/26/2025    LIPID PANEL  04/26/2025    INFLUENZA VACCINE  07/01/2025    DXA SCAN  12/07/2025    MAMMOGRAM  01/26/2026    ANNUAL WELLNESS VISIT  04/21/2026    COLORECTAL CANCER SCREENING  05/01/2029    TDAP/TD VACCINES (5 - Td or Tdap) 09/23/2029    HEPATITIS C SCREENING  Completed    Pneumococcal Vaccine 50+  Completed    ZOSTER VACCINE  Completed    HEMOGLOBIN A1C  Discontinued                                                                                                                                                Physical Exam  Constitutional:       General: She is not in acute distress.  HENT:      Head: Normocephalic and atraumatic.      Right Ear: Tympanic  membrane normal.      Left Ear: Tympanic membrane normal.      Nose: Nose normal.      Mouth/Throat:      Mouth: Mucous membranes are moist.      Pharynx: Oropharynx is clear. No posterior oropharyngeal erythema.   Eyes:      Extraocular Movements: Extraocular movements intact.      Conjunctiva/sclera: Conjunctivae normal.   Neck:      Comments: - Thyroid not enlarged  Cardiovascular:      Rate and Rhythm: Normal rate and regular rhythm.      Heart sounds: Normal heart sounds.   Pulmonary:      Effort: Pulmonary effort is normal.      Breath sounds: Normal breath sounds. No stridor. No wheezing.   Abdominal:      General: Abdomen is flat. Bowel sounds are normal.      Palpations: Abdomen is soft. There is no mass.      Tenderness: There is no abdominal tenderness. There is no guarding.   Musculoskeletal:         General: No swelling or deformity. Normal range of motion.      Cervical back: Normal range of motion and neck supple.   Skin:     General: Skin is warm and dry.      Capillary Refill: Capillary refill takes less than 2 seconds.      Coloration: Skin is not jaundiced.      Findings: No rash.   Neurological:      General: No focal deficit present.      Mental Status: She is alert and oriented to person, place, and time.      Cranial Nerves: No cranial nerve deficit.      Motor: No weakness.      Coordination: Coordination normal.      Gait: Gait normal.   Psychiatric:         Mood and Affect: Mood normal.         Behavior: Behavior normal.         Thought Content: Thought content normal.         Diagnoses and all orders for this visit:    1. Medicare annual wellness visit, subsequent (Primary)  -     CBC & Differential  -     Comprehensive metabolic panel  - Filled out ACP paperwork in office today.  Patient will get the signature of her healthcare representative and all this will be scanned to the chart  - Pertinent screening labs ordered  - Breast cancer screening: Up-to-date  - Colon cancer screening:  Patient reports recent colonoscopy, transfer of information form signed to fax and get records added to chart  - DEXA scan: Due and ordered (will be due in December 2025)    2. Anxiety and depression  -    Increasing fluoxetine from 20 mg to FLUoxetine (PROzac) 40 MG capsule; Take 1 capsule by mouth Daily.  Dispense: 30 capsule; Refill: 1  - Encouraged patient to reach back out to her psychiatrist about possible other treatments to help with her anxiety and depression    3. Frequent headaches/ Other insomnia  - Will focus on anxiety and depression per above and see if these other issues improve or resolve    5. Drug side effects  -     CBC & Differential  -     Comprehensive metabolic panel    6. Prediabetes  -     Hemoglobin A1c    7. Thyroid disorder screen  -     TSH Rfx On Abnormal To Free T4    8. Osteopenia of lumbar spine  -     Vitamin D,25-Hydroxy  -     DEXA Bone Density Axial    9. Mixed hyperlipidemia  -     Lipid panel    10. Class 1 obesity due to excess calories with serious comorbidity and body mass index (BMI) of 31.0 to 31.9 in adult  BMI is >= 30 and <35. (Class 1 Obesity). The following options were offered after discussion;: exercise counseling/recommendations and nutrition counseling/recommendations           Follow Up  - 1 year for annual wellness exam   - 6 weeks anxiety and depression check in

## 2025-04-21 NOTE — PROGRESS NOTES
"Subjective   Adela Dowell is a 70 y.o. female.   Chief Complaint   Patient presents with    Medicare Wellness-subsequent    Anxiety and depression       History of Present Illness     Anxiety/Depression:  -Follow up from  11/25/2025: Patient had been on several medications for anxiety and depression. Was seeing psychiatry and had done TMS with good response to the initial few treatments then they stopped working as well for her.   - Patient liked how fluoxetine 20 mg was working for her and she wanted to continue.  Refills were placed  - Past medications: Lexapro (side effects and terrible side effects weaning off), Wellbutrin, Prozac - Patient currently seeing Dr. Delvin De Jesus (psychiatry) in [facility]   - Due to the complexity of her anxiety and depression, encouraged her to reach back out to her psychiatrist for management, patient was agreeable  Today  - hasn't seen psychiatry, feels ashamed the TMS didn't help  - Patient states headaches and insomnia \"come with the depression\".  She feels the anxiety depression of the root of the other 2 issues  - Patient answered \"over half the days\" on thought she better off dead or hurting herself in someway.  Patient denies any plans or intention for self-harm      Headaches/insomnia  - Follow-up from 2/27/2025: Started a few months prior, would wake up with bad headaches and had difficulty sleeping.  Started amitriptyline 10mg nightly  Today  -Pt states she discontinued Amitriptyline 03/25/2025, did not help with her headaches, made anxiety and depression worse.   - Since stopping the medication, her mood is very slightly better however still states that it is not back to where it was before            Patient Active Problem List   Diagnosis    Hypertension    Disorder of bursae and tendons in shoulder region    Chronic headaches    Arthritis    Anemia    H/O splenectomy    Osteopenia of lumbar spine    Depressive disorder    Class 1 obesity due to excess calories " "with body mass index (BMI) of 32.0 to 32.9 in adult    History of kidney stones    Osteoarthritis of left glenohumeral joint    DJD of left shoulder    Anxiety    Prediabetes    Mixed hyperlipidemia       Current Outpatient Medications on File Prior to Visit   Medication Sig Dispense Refill    aspirin-acetaminophen-caffeine (EXCEDRIN MIGRAINE) 250-250-65 MG per tablet Take 2 tablets by mouth Every 8 (Eight) Hours As Needed for Headache.      Calcium-Magnesium-Vitamin D (CALCIUM 1200+D3 PO)       lisinopril (PRINIVIL,ZESTRIL) 20 MG tablet Take 1 tablet by mouth Daily. 90 tablet 3    Magnesium 250 MG tablet Take 1 tablet by mouth Daily.      Misc Natural Products (OSTEO BI-FLEX ADV TRIPLE ST PO) Take 2 capsules by mouth Daily.      MULTIPLE MINERALS-VITAMINS PO Take  by mouth.      Omega-3 Fatty Acids (fish oil) 1000 MG capsule capsule Take 1 capsule by mouth 2 (Two) Times a Day With Meals.      sodium chloride (NS) 0.9 % irrigation       vitamin D3 125 MCG (5000 UT) capsule capsule Take 125 mcg by mouth Daily.      [DISCONTINUED] FLUoxetine (PROzac) 20 MG capsule TAKE 1 CAPSULE BY MOUTH EVERY DAY 30 capsule 0    [DISCONTINUED] amitriptyline (ELAVIL) 10 MG tablet Take 1 tablet by mouth Every Night. 30 tablet 2     No current facility-administered medications on file prior to visit.     Current outpatient and discharge medications have been reconciled for the patient.  Reviewed by: Janet Mendieta DO      Allergies   Allergen Reactions    Levofloxacin Swelling and Myalgia     Joint swelling         Objective   Visit Vitals  /70 (BP Location: Right arm, Patient Position: Sitting, Cuff Size: Adult)   Pulse 94   Temp 98.1 °F (36.7 °C) (Skin)   Resp 16   Ht 170.2 cm (67.01\")   Wt 90.1 kg (198 lb 9.6 oz)   SpO2 98%   BMI 31.10 kg/m²         Diagnoses and all orders for this visit:      2. Anxiety and depression  -    Increasing fluoxetine from 20 mg to FLUoxetine (PROzac) 40 MG capsule; Take 1 capsule by mouth Daily.  " Dispense: 30 capsule; Refill: 1  - Encouraged patient to reach back out to her psychiatrist about possible other treatments to help with her anxiety and depression    3. Frequent headaches/ Other insomnia  - Will focus on anxiety and depression per above and see if these other issues improve or resolve      Expected course, medications, and adverse effects discussed as appropriate.  Call or return if worsening or persistent symptoms. Hand hygiene was performed before donning protective equipment and after removal when leaving the room.    This document is intended for medical expert use only. Reading of this document by patients and/or patient's family without participating medical staff guidance may result in misinterpretation and unintended morbidity. Any interpretation of such data is the responsibility of the patient and/or family member responsible for the patient in concert with their primary or specialist providers, not to be left for sources of online searches such as [employer], [employer] or similar queries. Relying on these approaches to knowledge may result in misinterpretation, misguided goals of care and even death should patients or family members try recommendations outside of the realm of professional medical care.

## 2025-04-22 LAB
25(OH)D3+25(OH)D2 SERPL-MCNC: 72.5 NG/ML (ref 30–100)
ALBUMIN SERPL-MCNC: 4.7 G/DL (ref 3.9–4.9)
ALP SERPL-CCNC: 184 IU/L (ref 44–121)
ALT SERPL-CCNC: 8 IU/L (ref 0–32)
AST SERPL-CCNC: 24 IU/L (ref 0–40)
BASOPHILS # BLD AUTO: 0.1 X10E3/UL (ref 0–0.2)
BASOPHILS NFR BLD AUTO: 1 %
BILIRUB SERPL-MCNC: 0.4 MG/DL (ref 0–1.2)
BUN SERPL-MCNC: 13 MG/DL (ref 8–27)
BUN/CREAT SERPL: 18 (ref 12–28)
CALCIUM SERPL-MCNC: 9.7 MG/DL (ref 8.7–10.3)
CHLORIDE SERPL-SCNC: 103 MMOL/L (ref 96–106)
CHOLEST SERPL-MCNC: 229 MG/DL (ref 100–199)
CO2 SERPL-SCNC: 24 MMOL/L (ref 20–29)
CREAT SERPL-MCNC: 0.73 MG/DL (ref 0.57–1)
EGFRCR SERPLBLD CKD-EPI 2021: 90 ML/MIN/1.73
EOSINOPHIL # BLD AUTO: 0.3 X10E3/UL (ref 0–0.4)
EOSINOPHIL NFR BLD AUTO: 4 %
ERYTHROCYTE [DISTWIDTH] IN BLOOD BY AUTOMATED COUNT: 12.8 % (ref 11.7–15.4)
GLOBULIN SER CALC-MCNC: 2.2 G/DL (ref 1.5–4.5)
GLUCOSE SERPL-MCNC: 89 MG/DL (ref 70–99)
HBA1C MFR BLD: 5.9 % (ref 4.8–5.6)
HCT VFR BLD AUTO: 44.6 % (ref 34–46.6)
HDLC SERPL-MCNC: 76 MG/DL
HGB BLD-MCNC: 14.7 G/DL (ref 11.1–15.9)
IMM GRANULOCYTES # BLD AUTO: 0 X10E3/UL (ref 0–0.1)
IMM GRANULOCYTES NFR BLD AUTO: 0 %
LDLC SERPL CALC-MCNC: 139 MG/DL (ref 0–99)
LYMPHOCYTES # BLD AUTO: 2.5 X10E3/UL (ref 0.7–3.1)
LYMPHOCYTES NFR BLD AUTO: 35 %
MCH RBC QN AUTO: 29.6 PG (ref 26.6–33)
MCHC RBC AUTO-ENTMCNC: 33 G/DL (ref 31.5–35.7)
MCV RBC AUTO: 90 FL (ref 79–97)
MONOCYTES # BLD AUTO: 0.6 X10E3/UL (ref 0.1–0.9)
MONOCYTES NFR BLD AUTO: 8 %
NEUTROPHILS # BLD AUTO: 3.7 X10E3/UL (ref 1.4–7)
NEUTROPHILS NFR BLD AUTO: 52 %
PLATELET # BLD AUTO: 410 X10E3/UL (ref 150–450)
POTASSIUM SERPL-SCNC: 5 MMOL/L (ref 3.5–5.2)
PROT SERPL-MCNC: 6.9 G/DL (ref 6–8.5)
RBC # BLD AUTO: 4.97 X10E6/UL (ref 3.77–5.28)
SODIUM SERPL-SCNC: 141 MMOL/L (ref 134–144)
TRIGL SERPL-MCNC: 81 MG/DL (ref 0–149)
TSH SERPL DL<=0.005 MIU/L-ACNC: 1.11 UIU/ML (ref 0.45–4.5)
VLDLC SERPL CALC-MCNC: 14 MG/DL (ref 5–40)
WBC # BLD AUTO: 7.2 X10E3/UL (ref 3.4–10.8)

## 2025-04-29 ENCOUNTER — TELEPHONE (OUTPATIENT)
Dept: FAMILY MEDICINE CLINIC | Facility: CLINIC | Age: 70
End: 2025-04-29
Payer: MEDICARE

## 2025-04-29 DIAGNOSIS — R74.8 ELEVATED ALKALINE PHOSPHATASE LEVEL: Primary | ICD-10-CM

## 2025-04-29 NOTE — TELEPHONE ENCOUNTER
"Lab and liver ultrasound ordered    ------------------------------------------------------------------  Adela Dowell to [facility] (supporting Yadira Ward MA)        4/27/25  9:19 PM  I would like to go ahead with the liver ultrasound and additional blood tests for the liver.  However, I  would like hold off of the statins and see if I can lower my cholesterol with diet changes. Thanks!  Yadira Ward MA to Adela Dowell  [place]      4/24/25  8:36 AM  Shahab Chapman!  Please advise the message below per Dr. Mendieta,  \"Patient's red and white cells, thyroid screen and vitamin D came back in normal range.  Electrolytes, kidney function came back in normal range.  Patient's liver enzymes of AST and ALT are normal.    However her alkaline phosphatase has increased.  Typically this will come from the liver and bone but there are a few other sources.  I would like to offer a liver ultrasound and another blood test to see where the alkaline phosphatase might be coming from.  Let me know if she is agreeable and I can place orders.  Cholesterol has come up from last year.  Her ASCVD risk is increased from around 6% to up to 11.7%.  Would recommend starting a medication to decrease cholesterol and lower risk of heart attack and stroke in the next 10 years. Statins can cause muscle/joint pains or weakness.  If this occurs, would recommend taking a daily multivitamin or over-the-counter co-Q10.  If this does not help symptoms after a week or so, patient should call back to let us know and we can switch to a different statin (as some patients will react to one statin but not another).  If patient is agreeable, lets send an order in for rosuvastatin mg daily.  If she wants to try non-statin option, we could send in Zetia 10 mg daily.  However this has potential to cause muscle aches and weakness as well.  Just like with a statin if she has any issues do want her to reach out to us and let us know.  Her A1c came " "back still in the prediabetic range at 5.9%.  Encouraged her to decrease amount of sugar and carbs in her diet.\"     Thanks  Yadira RICHARD    Last read by Adela Dowell at 6:09PM on 4/28/2025.  "

## 2025-05-13 ENCOUNTER — HOSPITAL ENCOUNTER (OUTPATIENT)
Dept: ULTRASOUND IMAGING | Facility: HOSPITAL | Age: 70
Discharge: HOME | End: 2025-05-13
Admitting: STUDENT IN AN ORGANIZED HEALTH CARE EDUCATION/TRAINING PROGRAM
Payer: MEDICARE

## 2025-05-13 PROCEDURE — 76705 ECHO EXAM OF ABDOMEN: CPT

## 2025-05-15 LAB
ALP BONE CFR SERPL: 36 % (ref 14–68)
ALP INTEST CFR SERPL: 2 % (ref 0–18)
ALP LIVER CFR SERPL: 62 % (ref 18–85)
ALP SERPL-CCNC: 174 IU/L (ref 44–121)

## 2025-05-23 PROBLEM — K76.0 HEPATIC STEATOSIS: Status: ACTIVE | Noted: 2025-05-23

## 2025-05-30 ENCOUNTER — TELEPHONE (OUTPATIENT)
Dept: FAMILY MEDICINE CLINIC | Facility: CLINIC | Age: 70
End: 2025-05-30
Payer: MEDICARE

## 2025-05-30 DIAGNOSIS — N28.9 KIDNEY LESION, NATIVE, RIGHT: Primary | ICD-10-CM

## 2025-05-30 NOTE — TELEPHONE ENCOUNTER
BRIE message    Pt response:    I'm so sorry! I forgot all about this! Yes, please go ahead with the ultrasound.                 Patient's liver ultrasound showed mild fatty liver, she has a gallstone in her gallbladder but it is not causing any aggravation to the gallbladder itself.  There is a lesion on her right kidney that is likely a cyst but it was not seen on any prior CT.     Would like to offer an ultrasound of the right kidney to see if we can better evaluate cyst.  For fatty liver, the best treatment would be some weight loss     Frequent NSAIDs or aspirin use or steroids can cause fatty liver.  Her cholesterol had come up from last year,  Her A1c shows that she is prediabetic.  I would recommend working on diet and exercise to bring these down.  We can discuss this in a little bit more detail on her next appointment on 6/2/25     Please let me know if you would like to do the ultrasound of right kidney

## 2025-06-02 ENCOUNTER — OFFICE VISIT (OUTPATIENT)
Dept: FAMILY MEDICINE CLINIC | Facility: CLINIC | Age: 70
End: 2025-06-02
Payer: MEDICARE

## 2025-06-02 VITALS
TEMPERATURE: 97.1 F | RESPIRATION RATE: 18 BRPM | OXYGEN SATURATION: 97 % | HEIGHT: 67 IN | HEART RATE: 88 BPM | SYSTOLIC BLOOD PRESSURE: 128 MMHG | WEIGHT: 195.13 LBS | DIASTOLIC BLOOD PRESSURE: 82 MMHG | BODY MASS INDEX: 30.63 KG/M2

## 2025-06-02 DIAGNOSIS — E66.09 CLASS 1 OBESITY DUE TO EXCESS CALORIES WITHOUT SERIOUS COMORBIDITY WITH BODY MASS INDEX (BMI) OF 32.0 TO 32.9 IN ADULT: ICD-10-CM

## 2025-06-02 DIAGNOSIS — F41.9 ANXIETY AND DEPRESSION: Primary | ICD-10-CM

## 2025-06-02 DIAGNOSIS — F32.A ANXIETY AND DEPRESSION: Primary | ICD-10-CM

## 2025-06-02 DIAGNOSIS — G47.09 OTHER INSOMNIA: ICD-10-CM

## 2025-06-02 DIAGNOSIS — R51.9 FREQUENT HEADACHES: ICD-10-CM

## 2025-06-02 DIAGNOSIS — E66.811 CLASS 1 OBESITY DUE TO EXCESS CALORIES WITHOUT SERIOUS COMORBIDITY WITH BODY MASS INDEX (BMI) OF 32.0 TO 32.9 IN ADULT: ICD-10-CM

## 2025-06-02 PROCEDURE — 3044F HG A1C LEVEL LT 7.0%: CPT | Performed by: STUDENT IN AN ORGANIZED HEALTH CARE EDUCATION/TRAINING PROGRAM

## 2025-06-02 PROCEDURE — 3079F DIAST BP 80-89 MM HG: CPT | Performed by: STUDENT IN AN ORGANIZED HEALTH CARE EDUCATION/TRAINING PROGRAM

## 2025-06-02 PROCEDURE — 1126F AMNT PAIN NOTED NONE PRSNT: CPT | Performed by: STUDENT IN AN ORGANIZED HEALTH CARE EDUCATION/TRAINING PROGRAM

## 2025-06-02 PROCEDURE — 99214 OFFICE O/P EST MOD 30 MIN: CPT | Performed by: STUDENT IN AN ORGANIZED HEALTH CARE EDUCATION/TRAINING PROGRAM

## 2025-06-02 PROCEDURE — G2211 COMPLEX E/M VISIT ADD ON: HCPCS | Performed by: STUDENT IN AN ORGANIZED HEALTH CARE EDUCATION/TRAINING PROGRAM

## 2025-06-02 PROCEDURE — 3074F SYST BP LT 130 MM HG: CPT | Performed by: STUDENT IN AN ORGANIZED HEALTH CARE EDUCATION/TRAINING PROGRAM

## 2025-06-02 PROCEDURE — 1111F DSCHRG MED/CURRENT MED MERGE: CPT | Performed by: STUDENT IN AN ORGANIZED HEALTH CARE EDUCATION/TRAINING PROGRAM

## 2025-06-02 RX ORDER — FLUOXETINE HYDROCHLORIDE 40 MG/1
40 CAPSULE ORAL DAILY
Qty: 90 CAPSULE | Refills: 1 | Status: SHIPPED | OUTPATIENT
Start: 2025-06-02

## 2025-06-02 NOTE — PROGRESS NOTES
"Subjective   Adela Dowell is a 70 y.o. female.   Chief Complaint   Patient presents with    Depression       History of Present Illness     Anxiety and depression/frequent headaches/insomnia  - Follow up from 04/21/2025:   Patient hasn't seen psychiatry for follow up, felt bad the TMS didn't help. Has difficult to treat Depression  - Patient states headaches and insomnia \"come with the depression\".  She felt the anxiety depression of the root of the other 2 issues  - Increased fluoxetine from 20 mg to FLUoxetine 40 MG   - Encouraged patient to reach back out to her psychiatrist about possible other treatments to help with her anxiety and depression  Today  -Associated symptoms include: depressed mood and panic attacks   - Patient reports her mood, sleep and headaches are greatly improved on medication increase.  States that she like to stay on this dose for now  - Patient trying to start regular aerobic physical activity to help with her weight and mood      Elevated alkaline phosphatase  -Found on patient's routine lab work.  Ultrasound of the liver showed mild fatty liver and a gallstone without gallbladder wall edema or irritation  -Due to her frequent headaches, patient was using Excedrin migraine 4 times daily and Tylenol PM at night.    - After improvement and  results of elevated alkaline phosphatase, she has quit all of these cold turkey and is trying to reduce caffeine and sugar in her diet        The following portions of the patient's history were reviewed and updated as appropriate: allergies, current medications, past family history, past medical history, past social history, past surgical history, and problem list.    Patient Active Problem List   Diagnosis    Hypertension    Disorder of bursae and tendons in shoulder region    Chronic headaches    Arthritis    Anemia    H/O splenectomy    Osteopenia of lumbar spine    Anxiety and depression    Class 1 obesity due to excess calories with body mass " "index (BMI) of 32.0 to 32.9 in adult    History of kidney stones    Osteoarthritis of left glenohumeral joint    DJD of left shoulder    Anxiety    Prediabetes    Mixed hyperlipidemia    Hepatic steatosis       Current Outpatient Medications on File Prior to Visit   Medication Sig Dispense Refill    aspirin-acetaminophen-caffeine (EXCEDRIN MIGRAINE) 250-250-65 MG per tablet Take 2 tablets by mouth Every 8 (Eight) Hours As Needed for Headache.      Calcium-Magnesium-Vitamin D (CALCIUM 1200+D3 PO)       lisinopril (PRINIVIL,ZESTRIL) 20 MG tablet Take 1 tablet by mouth Daily. 90 tablet 3    Magnesium 250 MG tablet Take 1 tablet by mouth Daily.      Misc Natural Products (OSTEO BI-FLEX ADV TRIPLE ST PO) Take 2 capsules by mouth Daily.      MULTIPLE MINERALS-VITAMINS PO Take  by mouth.      Omega-3 Fatty Acids (fish oil) 1000 MG capsule capsule Take 1 capsule by mouth 2 (Two) Times a Day With Meals.      sodium chloride (NS) 0.9 % irrigation       vitamin D3 125 MCG (5000 UT) capsule capsule Take 125 mcg by mouth Daily.      [DISCONTINUED] FLUoxetine (PROzac) 40 MG capsule Take 1 capsule by mouth Daily. 30 capsule 1     No current facility-administered medications on file prior to visit.     Current outpatient and discharge medications have been reconciled for the patient.  Reviewed by: Janet Mendieta DO      Allergies   Allergen Reactions    Levofloxacin Swelling and Myalgia     Joint swelling         Objective   Visit Vitals  /82 (BP Location: Right arm, Patient Position: Sitting, Cuff Size: Adult)   Pulse 88   Temp 97.1 °F (36.2 °C) (Temporal)   Resp 18   Ht 170.2 cm (67\")   Wt 88.5 kg (195 lb 2 oz)   SpO2 97% Comment: ra   BMI 30.56 kg/m²       Physical Exam  HENT:      Head: Normocephalic and atraumatic.   Eyes:      Conjunctiva/sclera: Conjunctivae normal.   Neurological:      General: No focal deficit present.      Mental Status: She is alert and oriented to person, place, and time.   Psychiatric:         " Mood and Affect: Mood normal.         Behavior: Behavior normal.           Diagnoses and all orders for this visit:    1. Anxiety and depression (Primary)/ Frequent headaches/  Other insomnia  - Patient doing well/controlled on current regimen.  Will continue current regimen  - refilled  FLUoxetine (PROzac) 40 MG capsule; Take 1 capsule by mouth Daily.  Dispense: 90 capsule; Refill: 1    4. Class 1 obesity due to excess calories without serious comorbidity with body mass index (BMI) of 32.0 to 32.9 in adult  BMI is >= 30 and <35. (Class 1 Obesity). The following options were offered after discussion;: weight loss educational material (shared in after visit summary) and exercise counseling/recommendations     Follow Up  - 6 months anxiety check  -4/27/2026 for annual wellness exam    Expected course, medications, and adverse effects discussed as appropriate.  Call or return if worsening or persistent symptoms. Hand hygiene was performed before donning protective equipment and after removal when leaving the room.    This document is intended for medical expert use only. Reading of this document by patients and/or patient's family without participating medical staff guidance may result in misinterpretation and unintended morbidity. Any interpretation of such data is the responsibility of the patient and/or family member responsible for the patient in concert with their primary or specialist providers, not to be left for sources of online searches such as [employer], [employer] or similar queries. Relying on these approaches to knowledge may result in misinterpretation, misguided goals of care and even death should patients or family members try recommendations outside of the realm of professional medical care.

## 2025-06-10 ENCOUNTER — HOSPITAL ENCOUNTER (OUTPATIENT)
Dept: ULTRASOUND IMAGING | Facility: HOSPITAL | Age: 70
Discharge: HOME | End: 2025-06-10
Admitting: STUDENT IN AN ORGANIZED HEALTH CARE EDUCATION/TRAINING PROGRAM
Payer: MEDICARE

## 2025-06-10 PROCEDURE — 76775 US EXAM ABDO BACK WALL LIM: CPT

## 2025-07-21 DIAGNOSIS — F41.9 ANXIETY AND DEPRESSION: ICD-10-CM

## 2025-07-21 DIAGNOSIS — F32.A ANXIETY AND DEPRESSION: ICD-10-CM

## 2025-07-21 RX ORDER — FLUOXETINE HYDROCHLORIDE 40 MG/1
40 CAPSULE ORAL DAILY
Qty: 90 CAPSULE | Refills: 1 | Status: SHIPPED | OUTPATIENT
Start: 2025-07-21

## 2025-07-22 ENCOUNTER — OFFICE VISIT (OUTPATIENT)
Dept: FAMILY MEDICINE CLINIC | Facility: CLINIC | Age: 70
End: 2025-07-22
Payer: MEDICARE

## 2025-07-22 VITALS
BODY MASS INDEX: 30.61 KG/M2 | HEART RATE: 97 BPM | TEMPERATURE: 98 F | OXYGEN SATURATION: 98 % | WEIGHT: 195 LBS | SYSTOLIC BLOOD PRESSURE: 124 MMHG | RESPIRATION RATE: 18 BRPM | HEIGHT: 67 IN | DIASTOLIC BLOOD PRESSURE: 82 MMHG

## 2025-07-22 DIAGNOSIS — R73.03 PREDIABETES: ICD-10-CM

## 2025-07-22 DIAGNOSIS — N95.2 ATROPHIC VAGINITIS: Primary | ICD-10-CM

## 2025-07-22 DIAGNOSIS — I10 PRIMARY HYPERTENSION: ICD-10-CM

## 2025-07-22 DIAGNOSIS — R73.09 ELEVATED GLUCOSE: ICD-10-CM

## 2025-07-22 LAB
EXPIRATION DATE: ABNORMAL
HBA1C MFR BLD: 5.8 % (ref 4.5–5.7)
Lab: ABNORMAL

## 2025-07-22 RX ORDER — ESTRADIOL 0.1 MG/G
1 CREAM VAGINAL DAILY
Qty: 42.5 G | Refills: 12 | Status: SHIPPED | OUTPATIENT
Start: 2025-07-22

## 2025-07-22 NOTE — PROGRESS NOTES
Chief Complaint  Vaginal Pain, Hypertension, and Prediabetes    Subjective     CC  Problem List  Visit Diagnosis   Encounters  Notes  Medications  Labs  Result Review Imaging  Media    Adela Dowell presents to [facility] for   Vaginal Pain  The patient's primary symptoms include genital itching. The patient's pertinent negatives include no vaginal discharge. Primary symptoms comment: Vaginal burning. This is a new problem. The current episode started 1 to 4 weeks ago (2 weeks ago). The problem occurs constantly. The problem has been unchanged. The pain is moderate. The problem affects the vaginal only side. Pertinent negatives include no abdominal pain, back pain, constipation, diarrhea, discolored urine, dysuria, fever, flank pain, frequency, headaches, hematuria, joint swelling, nausea, painful intercourse or urgency. The symptoms are aggravated by activity. Treatments tried: medicated wipes. The treatment provided no relief. She is not sexually active. No, her partner does not have an STD. She is postmenopausal. The maximum temperature recorded prior to her arrival was no fever.   Hypertension  Chronicity:  Chronic  Onset:  More than 1 year ago  Progression since onset:  Stable  Condition status:  Controlled  Associated symptoms: no anxiety, no chest pain, no headaches, no malaise/fatigue, no palpitations, no sweats, no dyspnea with exertion and no dizziness    CAD risks:  Dyslipidemia, obesity and post-menopausal state  Current therapy:  ACE inhibitors  Improvement on treatment:  Significant  Compliance problems:  No compliance problems  Blood Sugar Problem  Chronicity:  Recurrent  Onset:  6 to12 months  Frequency:  Constantly  Progression since onset:  Improved  Symptoms: no abdominal pain, no chest pain, no fever, no headaches, no joint swelling, no myalgias, no nausea, no dysuria, no visual change and no weakness        Review of Systems   Constitutional:  Negative  "for fever, malaise/fatigue and unexpected weight loss.   Eyes:  Negative for visual disturbance.   Cardiovascular:  Negative for chest pain and palpitations.   Gastrointestinal:  Negative for abdominal pain, constipation, diarrhea and nausea.   Endocrine: Negative for cold intolerance, heat intolerance, polydipsia and polyuria.   Genitourinary:  Positive for vaginal pain. Negative for dysuria, flank pain, frequency, hematuria, urgency and vaginal discharge.   Musculoskeletal:  Negative for back pain and myalgias.   Neurological:  Negative for dizziness and weakness.        Objective   Vital Signs:   /82 (BP Location: Right arm, Patient Position: Sitting, Cuff Size: Adult)   Pulse 97   Temp 98 °F (36.7 °C) (Temporal)   Resp 18   Ht 170.2 cm (67\")   Wt 88.5 kg (195 lb)   SpO2 98%   BMI 30.54 kg/m²     Physical Exam  Constitutional:       General: She is not in acute distress.  Cardiovascular:      Rate and Rhythm: Normal rate and regular rhythm.      Pulses: Normal pulses.      Heart sounds: No murmur heard.  Pulmonary:      Effort: Pulmonary effort is normal.      Breath sounds: Normal breath sounds.   Abdominal:      General: Abdomen is flat. Bowel sounds are normal.      Palpations: Abdomen is soft.   Genitourinary:     General: Normal vulva.      Vagina: No vaginal discharge (atrophic changes.), bleeding or prolapsed vaginal walls.      Cervix: Normal. Eversion present.      Uterus: Normal.       Adnexa:         Right: No mass or tenderness.          Left: No mass or tenderness.     Musculoskeletal:      Cervical back: Neck supple.      Right lower leg: No edema.      Left lower leg: No edema.   Lymphadenopathy:      Cervical: No cervical adenopathy.   Skin:     Findings: No rash.   Neurological:      Mental Status: She is alert.        Result Review :Labs  Result Review  Imaging  Med Tab  Media           .notd     Assessment and Plan CC Problem List  Visit Diagnosis  ROS  Review (Popup)  " Health Maintenance  Quality  BestPractice  Medications  SmartSets  SnapShot Encounters  Media  Problem List Items Addressed This Visit          Unprioritized    Hypertension    Overview             Current Assessment & Plan   Hypertension is stable and controlled  Continue current treatment regimen.  Blood pressure will be reassessed in 3 months.         Prediabetes    Current Assessment & Plan   A1c 5.8 07/22/2025 continue diet increase exercise  Continue healthy diet and regular exercise.           Other Visit Diagnoses         Atrophic vaginitis    -  Primary    begin topical estrogen, sitz baths and follow.    Relevant Medications    estradiol (ESTRACE VAGINAL) 0.1 MG/GM vaginal cream      Elevated glucose        Relevant Orders    POC Glycosylated Hemoglobin (Hb A1C) (Completed)            Follow Up Instructions Charge Capture  Follow-up Communications  Return in about 3 months (around 10/22/2025), or if symptoms worsen or fail to improve.  Patient was given instructions and counseling regarding her condition or for health maintenance advice. Please see specific information pulled into the AVS if appropriate.

## (undated) DEVICE — SLV SCD CALF HEMOFORCE DVT THERP REPROC MD

## (undated) DEVICE — ANTIBACTERIAL UNDYED BRAIDED (POLYGLACTIN 910), SYNTHETIC ABSORBABLE SUTURE: Brand: COATED VICRYL

## (undated) DEVICE — GLV SURG SIGNATURE ESSENTIAL PF LTX SZ8

## (undated) DEVICE — SUT MNCRYL 3/0 Y936H

## (undated) DEVICE — UNDRGLV SURG BIOGEL PIMICROINDICATOR SYNTH SZ8 LF STRL

## (undated) DEVICE — UNDERGLV SURG BIOGEL/PI PF SYNTH SURG SZ8.5 BLU 50/BX

## (undated) DEVICE — PK TOTL SHLDR 50

## (undated) DEVICE — ADHS SKIN PREMIERPRO EXOFIN TOPICAL HI/VISC .5ML

## (undated) DEVICE — TBG PENCL TELESCP MEGADYNE SMOKE EVAC 15FT

## (undated) DEVICE — PATIENT RETURN ELECTRODE, SINGLE-USE, CONTACT QUALITY MONITORING, ADULT, WITH 9FT CORD, FOR PATIENTS WEIGING OVER 33LBS. (15KG): Brand: MEGADYNE

## (undated) DEVICE — DECANTER: Brand: UNBRANDED

## (undated) DEVICE — SOL IRRIG NACL 1000ML

## (undated) DEVICE — SOLUTION,WATER,IRRIGATION,1000ML,STERILE: Brand: MEDLINE

## (undated) DEVICE — UNDERGLV SURG BIOGEL INDICAT PI SZ8 BLU

## (undated) DEVICE — TOWEL,OR,DSP,ST,WHITE,DLX,4/PK,20PK/CS: Brand: MEDLINE

## (undated) DEVICE — SOL IRR NACL 0.9PCT ARTHROMATIC 3000ML

## (undated) DEVICE — BAPTIST FLOYD BRONCHOSCOPY: Brand: MEDLINE INDUSTRIES, INC.

## (undated) DEVICE — DUAL CUT SAGITTAL BLADE

## (undated) DEVICE — KT SURG TURNOVER 050

## (undated) DEVICE — SUT MONOCRYL PLS ANTIB UND 3/0  PS1 27IN

## (undated) DEVICE — WRAP SHOULDER COLD THERAPY

## (undated) DEVICE — DRSNG SURESITE WNDW 4X4.5

## (undated) DEVICE — GLV SURG SENSICARE PI ORTHO SZ8 LF STRL

## (undated) DEVICE — T-MAX DISPOSABLE FACE MASK 8 PER BOX

## (undated) DEVICE — CVR HNDL LT SURG ACCSSRY BLU STRL

## (undated) DEVICE — MARKR SKIN W/RULR